# Patient Record
Sex: FEMALE | Race: WHITE | NOT HISPANIC OR LATINO | Employment: OTHER | URBAN - NONMETROPOLITAN AREA
[De-identification: names, ages, dates, MRNs, and addresses within clinical notes are randomized per-mention and may not be internally consistent; named-entity substitution may affect disease eponyms.]

---

## 2017-01-03 ENCOUNTER — HOSPITAL ENCOUNTER (OUTPATIENT)
Dept: LAB | Facility: MEDICAL CENTER | Age: 82
End: 2017-01-03
Attending: NURSE PRACTITIONER
Payer: MEDICARE

## 2017-01-03 LAB
INR PPP: 2.61 (ref 0.87–1.13)
PROTHROMBIN TIME: 28.7 SEC (ref 12–14.6)

## 2017-01-03 PROCEDURE — 36415 COLL VENOUS BLD VENIPUNCTURE: CPT

## 2017-01-03 PROCEDURE — 85610 PROTHROMBIN TIME: CPT

## 2017-01-04 ENCOUNTER — ANTICOAGULATION MONITORING (OUTPATIENT)
Dept: VASCULAR LAB | Facility: MEDICAL CENTER | Age: 82
End: 2017-01-04

## 2017-01-04 DIAGNOSIS — Z95.2 HISTORY OF HEART VALVE REPLACEMENT WITH MECHANICAL VALVE: ICD-10-CM

## 2017-01-04 NOTE — PROGRESS NOTES
OP Anticoagulation Telephone Note    Date: 1/4/2017  Anticoagulation Summary as of 1/4/2017     INR goal 2.5-3.5   Selected INR 2.61 (1/3/2017)   Maintenance plan 2.5 mg (5 mg x 0.5) on Mon, Fri; 5 mg (5 mg x 1) all other days   Weekly total 30 mg   No change documented Aminata Gomez, Med Ass't   Plan last modified Galileo Ramirez, ADRIA (10/21/2016)   Next INR check 1/31/2017   Target end date Indefinite    Indications   CVA (cerebral vascular accident) (HCC) [I63.9]  History of heart valve replacement with mechanical valve [V43.3] [Z95.2]         Anticoagulation Episode Summary     INR check location     Preferred lab     Send INR reminders to     Comments       Anticoagulation Care Providers     Provider Role Specialty Phone number    Robi Bolaños, PHARMD Responsible          Anticoagulation Patient Findings   Negatives Missed Doses, Extra Doses, Medication Changes, Antibiotic Use, Diet Changes, Dental/Other Procedures, Hospitalization, Bleeding Gums, Nose Bleeds, Blood in Urine, Blood in Stool, Any Bruising, Other Complaints      Plan:  Left message on patient's answering machine/ voicemail. Instructed patient to call back with any concerns regarding any unusual bleeding or bruising, any medication or diet changes, or any signs or symptoms of thrombosis. Instructed patient to resume medication as outlined above. Patient to follow up in 4 weeks.     Aminata Gomez, Medical Assistant    I have reviewed and concur with the above plan     Loyd Moses, FAUSTINAD

## 2017-01-23 ENCOUNTER — OFFICE VISIT (OUTPATIENT)
Dept: BEHAVIORAL HEALTH | Facility: PHYSICIAN GROUP | Age: 82
End: 2017-01-23
Payer: MEDICARE

## 2017-01-23 DIAGNOSIS — F06.30 MOOD DISORDER DUE TO KNOWN PHYSIOLOGICAL CONDITION, UNSPECIFIED: ICD-10-CM

## 2017-01-23 PROCEDURE — 99213 OFFICE O/P EST LOW 20 MIN: CPT | Performed by: PSYCHIATRY & NEUROLOGY

## 2017-01-23 NOTE — MR AVS SNAPSHOT
Adrienne Mejia   2017 2:30 PM   Office Visit   MRN: [id number]    Department:  [facility]   Dept Phone:  [phone]    Description:  Female : 1934   Provider:  Huy Yates M.D.           Reason for Visit     Follow-Up feeling up for most of the time lately.      Allergies as of 2017     No Known Allergies      Vital Signs     Smoking Status                   Never Smoker            Basic Information     Date Of Birth Sex Race Ethnicity Preferred Language    1934 Female White Non- English      Problem List              ICD-10-CM Priority Class Noted - Resolved    S/P AVR Z95.2   2012 - Present    Benign essential HTN (Chronic) I10   2012 - Present    S/P cardiac catheterization Z98.890   2012 - Present    Chronic anticoagulation (Chronic) Z79.01   2012 - Present    DM (diabetes mellitus) (CMS-HCC) (Chronic) E11.9   2012 - Present    Hx of echocardiogram (Chronic) Z92.89   2012 - Present    Hyperlipidemia E78.5   11/15/2012 - Present    Carotid artery stenosis I65.29   11/15/2012 - Present    Left shoulder pain M25.512   3/7/2013 - Present    CVA (cerebral vascular accident) (CMS-HCC) I63.9   Unknown - Present    Fecal urgency (Chronic) R15.2   3/7/2013 - Present    Urgency of urination (Chronic) R39.15   3/7/2013 - Present    CRF (chronic renal failure) N18.9  Chronic 2013 - Present    Hypothyroid E03.9  Chronic 10/4/2013 - Present    GERD (gastroesophageal reflux disease) K21.9   Unknown - Present    Seasonal allergies J30.2   2014 - Present    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, 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   2014 - Present    GI bleed K92.2   12/3/2014 - Present    Lower urinary tract infectious disease N39.0   12/3/2014 - Present    History of mitral valve replacement with mechanical valve [V43.3] Z95.2   2015 - Present    History of heart valve replacement with mechanical valve [V43.3] Z95.2   2015 - Present      Health Maintenance        Date Due Completion Dates    IMM DTaP/Tdap/Td Vaccine (1 - Tdap) 1953 ---    MAMMOGRAM 1974 ---    COLONOSCOPY 1984 ---    IMM ZOSTER VACCINE 1994 ---    BONE DENSITY 1999 ---    IMM PNEUMOCOCCAL 65+ (ADULT) LOW/MEDIUM RISK SERIES (1 of 2 - PCV13) 1999 ---    DIABETES MONOFILAMENT / LE EXAM 2013 (Prv Comp)    Override on 2012: Previously completed (decreased sensitivity to monofilament bilaterally)    RETINAL SCREENING 2016    IMM INFLUENZA (1) 2016, 10/12/2012    URINE ACR / MICROALBUMIN 2016, 5/15/2015, 2014, 10/3/2013, 2012 (Prv Comp)    Override on 2012: Previously completed (6.21 mg/gm creatinine)    SERUM CREATININE 2016, 5/15/2015, 12/3/2014, 2014, 2014, 4/15/2014, 10/3/2013, 2012 (Prv Comp), 2010, 4/15/2010, 2010, 2010, 4/10/2010, 2010, 2010, 2010, 2010, 2010, 4/3/2010    Override on 2012: Previously completed (1.2 mg/dL)    A1C SCREENING 2017, 2016, 3/23/2016, 2015, 5/15/2015, 2015, 2014, 4/15/2014, 10/3/2013, 2012 (Prv Comp), 4/3/2010    Override on 2012: Previously completed (7.2%)    FASTING LIPID PROFILE 2017, 2015, 12/3/2014, 2014, 4/15/2014, 2/10/2012 (Prv Comp), 4/3/2010    Override on 2/10/2012: Previously completed (Total:  150;  LDL:  75;  HDL  48;  T)            Current Immunizations     Influenza TIV (IM) 2013 12:09 PM, 10/12/2012      Below and/or attached are the medications your provider expects you to take. Review all of your home medications and newly ordered medications with your provider and/or pharmacist. Follow medication instructions as directed by your provider and/or pharmacist. Please keep your medication list with you and share with your provider. Update the information when medications are discontinued, doses are  changed, or new medications (including over-the-counter products) are added; and carry medication information at all times in the event of emergency situations     Allergies:  No Known Allergies          Medications  Valid as of: January 23, 2017 -  3:23 PM    Generic Name Brand Name Tablet Size Instructions for use    Acetaminophen (Tab CR) TYLENOL 650 MG Take 650 mg by mouth every four hours as needed for Mild Pain.        ALPRAZolam (Tab) XANAX 0.5 MG Take 1 Tab by mouth at bedtime as needed for Sleep.        Canagliflozin   Take  by mouth.        Cholecalciferol (Tab) cholecalciferol 1000 UNIT Take 1,000 Units by mouth 2 Times a Day.        Doxycycline Hyclate (Tab) VIBRAMYCIN 100 MG Take 1 Tab by mouth 2 times a day.        Glimepiride (Tab) AMARYL 4 MG Take 4 mg by mouth every morning.        Insulin Glargine (Solution) LANTUS 100 UNIT/ML Inject 16 Units as instructed every day.        Levothyroxine Sodium (Tab) SYNTHROID 88 MCG Take 1 Tab by mouth every day.        Loratadine (Tab) CLARITIN 10 MG Take 1 Tab by mouth every day.        Omeprazole (CAPSULE DELAYED RELEASE) PRILOSEC 20 MG Take 1 Cap by mouth every day.        Pravastatin Sodium (Tab) PRAVACHOL 40 MG Take 1 Tab by mouth every day.        Warfarin Sodium (Tab) COUMADIN 5 MG TAKE ONE-HALF TO ONE TABLET BY MOUTH ONCE DAILY AS  DIRECTED  BY  COUMADIN  CLINIC        Zolpidem Tartrate   Take  by mouth.        .                 Medicines prescribed today were sent to:     [facility] [address] [place] [id number]    Phone: [phone] Fax: [phone]    Open 24 Hours?: No      Medication refill instructions:       If your prescription bottle indicates you have medication refills left, it is not necessary to call your provider’s office. Please contact your pharmacy and they will refill your medication.    If your prescription bottle indicates you do not have any refills left, you may request refills at  any time through one of the following ways: The online [employer] system (except Urgent Care), by calling your provider’s office, or by asking your pharmacy to contact your provider’s office with a refill request. Medication refills are processed only during regular business hours and may not be available until the next business day. Your provider may request additional information or to have a follow-up visit with you prior to refilling your medication.   *Please Note: Medication refills are assigned a new Rx number when refilled electronically. Your pharmacy may indicate that no refills were authorized even though a new prescription for the same medication is available at the pharmacy. Please request the medicine by name with the pharmacy before contacting your provider for a refill.        Other Notes About Your Plan     This patient has seen Danelle Adler in the past. Please reach out to her for an AWV. 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 In your medical opinion, could this be c/w hemorrhagic disorder d/t extrinsic circulating anticoagulants? If you are in agreement suggested code is D68.32.    Query: Could you please obtain her last retinal screening exam?           [employer] Access Code: [email or web address]  Expires: 2/13/2017  9:33 AM    [employer]  A secure, online tool to manage your health information     [employer]® is a secure, online tool that connects you to your personalized health information from the privacy of your home -- day or night - making it very easy for you to manage your healthcare. Once the activation process is completed, you can even access your medical information using the [employer] andrea, which is available for free  in the [employer] store or Google Play store.     [employer] provides the following levels of access (as shown below):   My Chart Features   [facility] Doctor [facility]  Specialists [facility]  Urgent  Care [facility]  Primary Care  Doctor   Email your healthcare team securely and privately 24/7 X X X    Manage appointments: schedule your next appointment; view details of past/upcoming appointments X      Request prescription refills. X      View recent personal medical records, including lab and immunizations X X X X   View health record, including health history, allergies, medications X X X X   Read reports about your outpatient visits, procedures, consult and ER notes X X X X   See your discharge summary, which is a recap of your hospital and/or ER visit that includes your diagnosis, lab results, and care plan. X X       How to register for [employer]:  1. Go to  [email or web address].  2. Click on the Sign Up Now box, which takes you to the New Member Sign Up page. You will need to provide the following information:  a. Enter your [employer] Access Code exactly as it appears at the top of this page. (You will not need to use this code after you’ve completed the sign-up process. If you do not sign up before the expiration date, you must request a new code.)   b. Enter your date of birth.   c. Enter your home email address.   d. Click Submit, and follow the next screen’s instructions.  3. Create a [employer] ID. This will be your [employer] login ID and cannot be changed, so think of one that is secure and easy to remember.  4. Create a [employer] password. You can change your password at any time.  5. Enter your Password Reset Question and Answer. This can be used at a later time if you forget your password.   6. Enter your e-mail address. This allows you to receive e-mail notifications when new information is available in [employer].  7. Click Sign Up. You can now view your health information.    For assistance activating  your MyChart account, call [phone]

## 2017-01-24 NOTE — PROGRESS NOTES
"[facility]  PSYCHIATRIC FOLLOW-UP NOTE    Name: Adrienne Mejia  MRN: [id number]  : 1934  Age: 82 y.o.  Date of assessment: 2017  PCP: Connor Horn M.D.  Persons in attendance: Patient    REASON FOR VISIT/CHIEF COMPLAINT (as stated by Patient):  Adrienne Mejia is a 82 y.o., White female, attending follow-up appointment for   Chief Complaint   Patient presents with   • Follow-Up     feeling up for most of the time lately.   .      HISTORY OF PRESENT ILLNESS:    Pt tx for Mood disorder NOS, pt not taking meds.  Pt reports to clinic and states that \"things were going good until yesterday\".  Pt explains that her  stated for the first time that he would like a divorce, and he would like to part ways with the pt.  Pt states that she thought things were going good, \"until he said that\".  Pt again recounted the story of visiting  but not being able to decide if she or they wanted to go through with the divorce.  Pt debated further if she actually wants a divorce.  Pt discussed her need to get a job if the  leaves, and asked what I thought.  Encouraged pt to consider her ability to drive, find a position suited to her current ability, and whether she actually wants to divorce her .  Pt again recounts the sexual performance issues of her , her desire to find other sexual partners, and whether  and seeking them out is a good idea. Pt continues to [place] the same subject matter.  Pt has not pursued [facility], as previously discussed.  Discussed the possibility of couples therapy to help mediate communication between the pt and .  Pt notes they are selling the house in [place].  Pt states the  will give her everything, that he just wants to be free of her.     PSYCHOSOCIAL CHANGES SINCE PREVIOUS CONTACT:  No change    RESPONSE TO TREATMENT:  Not taking meds    MEDICATION SIDE EFFECTS:  n/a    REVIEW OF SYSTEMS:        Constitutional " "negative   Eyes negative   Ears/Nose/Mouth/Throat negative   Cardiovascular negative   Respiratory negative   Gastrointestinal negative   Genitourinary negative   Muscular negative   Integumentary negative   Neurological negative   Endocrine negative   Hematologic/Lymphatic negative       PSYCHIATRIC EXAMINATION/MENTAL STATUS  There were no vitals taken for this visit.  Participation: Active verbal participation  Grooming:Neat  Orientation: Alert and Fully Oriented  Eye contact: Good  Behavior:Calm   Mood: Euthymic  Affect: Congruent with content  Thought process: Perseveration  Thought content:  Within normal limits  Speech: Rate within normal limits and Volume within normal limits  Perception:  Within normal limits  Memory:  Pt exhibited some deficits, last MOCA was a 24/30.    Insight: Adequate  Judgment: Adequate  Family/couple interaction observations: trouble relationship with   Other:    Current risk:    Suicide: Low   Homicide: Low   Self-harm: Low  Relapse: Low  Other:   Crisis Safety Plan reviewed?Yes  If evidence of imminent risk is present, intervention/plan:    Medical Records/Labs/Diagnostic Tests Reviewed: continues to have PT/INR monitored    Medical Records/Labs/Diagnostic Tests Ordered: none    DIAGNOSTIC IMPRESSION(S):  1. Mood disorder due to known physiological condition, unspecified           ASSESSMENT AND PLAN:  #Mood disorder, unspecified  #R/O dependent traits  Pt continues to complain of the same issues.  She complains on this visit that \"things were good until yesterday\" when her  verbally suggested divorce for the first time.  Pt recounted several of the big decisions she has always wrestled with.  Encourage and reminded pt about the importance and need for therapy in her case.  Encourage pt to seek individual but also couples therapy via [facility] because this may help mediate communication with .  Pt conitnues to wrestle with the idea of independence and a job vs " "\"needing\" her  for transportation and financial assistance.  Pt has had intermittent conversations with  on the matter of divorce.  Pt reports she is having difficulty with the co-pay so she would like to return to clinic PRN.  Provided supportive therapy.    More than 50% of face-to-face time in this 30minute visit was not spent in psychotherapy/counseling.    Topics addressed include:    Huy Yates M.D.                   "

## 2017-01-31 ENCOUNTER — HOSPITAL ENCOUNTER (OUTPATIENT)
Dept: LAB | Facility: MEDICAL CENTER | Age: 82
End: 2017-01-31
Attending: NURSE PRACTITIONER
Payer: MEDICARE

## 2017-01-31 LAB
INR PPP: 2.48 (ref 0.87–1.13)
PROTHROMBIN TIME: 27.6 SEC (ref 12–14.6)

## 2017-01-31 PROCEDURE — 85610 PROTHROMBIN TIME: CPT

## 2017-01-31 PROCEDURE — 36415 COLL VENOUS BLD VENIPUNCTURE: CPT

## 2017-02-01 ENCOUNTER — ANTICOAGULATION MONITORING (OUTPATIENT)
Dept: VASCULAR LAB | Facility: MEDICAL CENTER | Age: 82
End: 2017-02-01

## 2017-02-01 DIAGNOSIS — Z95.2 HISTORY OF HEART VALVE REPLACEMENT WITH MECHANICAL VALVE: ICD-10-CM

## 2017-02-01 NOTE — PROGRESS NOTES
OP Anticoagulation Service Note    Date: 2/1/2017    Anticoagulation Summary as of 2/1/2017     INR goal 2.5-3.5   Selected INR 2.48! (1/31/2017)   Maintenance plan 2.5 mg (5 mg x 0.5) on Mon, Fri; 5 mg (5 mg x 1) all other days   Weekly total 30 mg   Plan last modified Galileo Ramirez PHARMD (10/21/2016)   Next INR check 2/14/2017   Target end date Indefinite    Indications   CVA (cerebral vascular accident) (CMS-HCC) [I63.9]  History of heart valve replacement with mechanical valve [V43.3] [Z95.2]         Anticoagulation Episode Summary     INR check location     Preferred lab     Send INR reminders to     Comments       Anticoagulation Care Providers     Provider Role Specialty Phone number    Robi Bolaños, PHARMD Responsible          Anticoagulation Patient Findings   Positives Diet Changes          Plan:  INR is low today. Spoke with pt on the phone. Confirmed dosing regimen. No missed or extra dosing taken. Patient denies sign/symptoms of bleeding/clotting. No recent medication changes She reports she may be eating more green vegetables.  Instructed pt to call clinic with any concerns of bleeding or thrombosis. Instructed pt to take 7.5 mg tonight ONLY then tomorrow resume usual regimen.  Follow up in 2 weeks        Sarahy Fink, Pharm D

## 2017-03-02 ENCOUNTER — TELEPHONE (OUTPATIENT)
Dept: VASCULAR LAB | Facility: MEDICAL CENTER | Age: 82
End: 2017-03-02

## 2017-03-02 NOTE — TELEPHONE ENCOUNTER
[facility]    Pt states he will obtain INR at earliest convenience.    Robi Bolaños, PHARMD

## 2017-03-27 ENCOUNTER — ANTICOAGULATION MONITORING (OUTPATIENT)
Dept: VASCULAR LAB | Facility: MEDICAL CENTER | Age: 82
End: 2017-03-27

## 2017-03-27 ENCOUNTER — TELEPHONE (OUTPATIENT)
Dept: VASCULAR LAB | Facility: MEDICAL CENTER | Age: 82
End: 2017-03-27

## 2017-03-27 DIAGNOSIS — Z95.2 HISTORY OF HEART VALVE REPLACEMENT WITH MECHANICAL VALVE: ICD-10-CM

## 2017-03-27 LAB — INR PPP: 2 (ref 2–3.5)

## 2017-03-27 NOTE — TELEPHONE ENCOUNTER
[facility]    Pt states she will obtain INR at earliest convenience.    Robi Bolaños, PHARMD

## 2017-03-27 NOTE — PROGRESS NOTES
Anticoagulation Summary as of 3/27/2017     INR goal 2.5-3.5   Selected INR 2.0! (3/27/2017)   Maintenance plan 2.5 mg (5 mg x 0.5) on Tue; 5 mg (5 mg x 1) all other days   Weekly total 32.5 mg   Plan last modified Galileo Ramirez PHARMD (3/27/2017)   Next INR check 4/10/2017   Target end date Indefinite    Indications   CVA (cerebral vascular accident) (CMS-HCC) [I63.9]  History of heart valve replacement with mechanical valve [V43.3] [Z95.2]         Anticoagulation Episode Summary     INR check location     Preferred lab     Send INR reminders to     Comments       Anticoagulation Care Providers     Provider Role Specialty Phone number    Robi Bolaños, PHARMD Responsible          Anticoagulation Patient Findings   Negatives Missed Doses, Extra Doses, Medication Changes, Antibiotic Use, Diet Changes, Dental/Other Procedures, Hospitalization, Bleeding Gums, Nose Bleeds, Blood in Urine, Blood in Stool, Any Bruising, Other Complaints        Spoke with patient today regarding subtherapeutic INR of 2.0.  Patient denies any signs/symptoms of bruising or bleeding or any changes in diet and medications.  Instructed patient to call clinic with any questions or concerns.  Instructed patient to bolus with 7.5mg X 1, then increase weekly warfarin regimen by ~8% as detailed above.  Follow up in 2 weeks.    Galileo Ramirez PHARMD

## 2017-04-25 ENCOUNTER — TELEPHONE (OUTPATIENT)
Dept: VASCULAR LAB | Facility: MEDICAL CENTER | Age: 82
End: 2017-04-25

## 2017-04-26 LAB — INR PPP: 2.6 (ref 2–3.5)

## 2017-04-27 ENCOUNTER — ANTICOAGULATION MONITORING (OUTPATIENT)
Dept: VASCULAR LAB | Facility: MEDICAL CENTER | Age: 82
End: 2017-04-27

## 2017-04-27 DIAGNOSIS — Z95.2 HISTORY OF HEART VALVE REPLACEMENT WITH MECHANICAL VALVE: ICD-10-CM

## 2017-04-27 NOTE — PROGRESS NOTES
Anticoagulation Summary as of 4/27/2017     INR goal 2.5-3.5   Selected INR 2.6 (4/26/2017)   Maintenance plan 2.5 mg (5 mg x 0.5) on Tue; 5 mg (5 mg x 1) all other days   Weekly total 32.5 mg   Plan last modified Galileo Ramirez PHARMD (3/27/2017)   Next INR check 5/25/2017   Target end date Indefinite    Indications   CVA (cerebral vascular accident) (CMS-HCC) [I63.9]  History of heart valve replacement with mechanical valve [V43.3] [Z95.2]         Anticoagulation Episode Summary     INR check location     Preferred lab     Send INR reminders to     Comments       Anticoagulation Care Providers     Provider Role Specialty Phone number    Robi Bolaños, PHARMD Responsible          Anticoagulation Patient Findings    Patient's INR was therapeutic.   Denies any unusual s/s of bleeding, bruising, clotting.  Denies any changes to:   Diet   Medications  Confirmed dosing regimen.    Pt is to continue with current warfarin dosing regimen.    Follow up in 4 week(s)    Robi Bolaños, FAUSTINAD

## 2017-06-07 ENCOUNTER — TELEPHONE (OUTPATIENT)
Dept: VASCULAR LAB | Facility: MEDICAL CENTER | Age: 82
End: 2017-06-07

## 2017-06-07 NOTE — TELEPHONE ENCOUNTER
[facility]    Phone number not working.  Unable to leave message to have INR checked.  Will continue to follow.    Robi Bolaños, FAUSTINAD

## 2017-06-14 ENCOUNTER — TELEPHONE (OUTPATIENT)
Dept: VASCULAR LAB | Facility: MEDICAL CENTER | Age: 82
End: 2017-06-14

## 2017-06-28 ENCOUNTER — TELEPHONE (OUTPATIENT)
Dept: VASCULAR LAB | Facility: MEDICAL CENTER | Age: 82
End: 2017-06-28

## 2017-06-29 DIAGNOSIS — Z95.2 HISTORY OF MITRAL VALVE REPLACEMENT WITH MECHANICAL VALVE: ICD-10-CM

## 2017-06-29 DIAGNOSIS — Z79.01 CHRONIC ANTICOAGULATION: Chronic | ICD-10-CM

## 2017-06-29 LAB — INR PPP: 2.9 (ref 2–3.5)

## 2017-06-30 ENCOUNTER — ANTICOAGULATION MONITORING (OUTPATIENT)
Dept: VASCULAR LAB | Facility: MEDICAL CENTER | Age: 82
End: 2017-06-30

## 2017-06-30 DIAGNOSIS — Z95.2 HISTORY OF HEART VALVE REPLACEMENT WITH MECHANICAL VALVE: ICD-10-CM

## 2017-06-30 NOTE — PROGRESS NOTES
Anticoagulation Summary as of 6/30/2017     INR goal 2.5-3.5   Selected INR 2.9 (6/29/2017)   Maintenance plan 2.5 mg (5 mg x 0.5) on Tue; 5 mg (5 mg x 1) all other days   Weekly total 32.5 mg   Plan last modified Galileo Ramirez PHARMD (3/27/2017)   Next INR check 8/11/2017   Target end date Indefinite    Indications   CVA (cerebral vascular accident) (CMS-[facility]) [I63.9]  History of heart valve replacement with mechanical valve [V43.3] [Z95.2]         Anticoagulation Episode Summary     INR check location     Preferred lab     Send INR reminders to     Comments       Anticoagulation Care Providers     Provider Role Specialty Phone number    Robi Bolaños, PHARMD Responsible          Anticoagulation Patient Findings   Negatives Missed Doses, Extra Doses, Medication Changes, Antibiotic Use, Diet Changes, Dental/Other Procedures, Hospitalization, Bleeding Gums, Nose Bleeds, Blood in Urine, Blood in Stool, Any Bruising, Other Complaints        Spoke with patient today regarding therapeutic INR of 2.9.  Patient denies any signs/symptoms of bruising or bleeding or any changes in diet and medications.  Instructed patient to call clinic with any questions or concerns.  Pt is to continue with current warfarin dosing regimen.  Follow up in 6 weeks.    Galileo Ramirez, ADRIA

## 2017-07-20 LAB — INR PPP: 4.7 (ref 2–3.5)

## 2017-07-21 ENCOUNTER — ANTICOAGULATION MONITORING (OUTPATIENT)
Dept: VASCULAR LAB | Facility: MEDICAL CENTER | Age: 82
End: 2017-07-21

## 2017-07-21 DIAGNOSIS — Z95.2 HISTORY OF HEART VALVE REPLACEMENT WITH MECHANICAL VALVE: ICD-10-CM

## 2017-07-21 NOTE — PROGRESS NOTES
Anticoagulation Summary as of 7/21/2017     INR goal 2.5-3.5   Selected INR 4.7! (7/20/2017)   Maintenance plan 2.5 mg (5 mg x 0.5) on Tue; 5 mg (5 mg x 1) all other days   Weekly total 32.5 mg   Plan last modified Galileo Ramirez, PHARMD (3/27/2017)   Next INR check 8/4/2017   Target end date Indefinite    Indications   CVA (cerebral vascular accident) (CMS-HCC) [I63.9]  History of heart valve replacement with mechanical valve [V43.3] [Z95.2]         Anticoagulation Episode Summary     INR check location     Preferred lab     Send INR reminders to     Comments       Anticoagulation Care Providers     Provider Role Specialty Phone number    FAUSTINA HolderD Responsible          Anticoagulation Patient Findings    Patient's INR was SUPRA therapeutic.   Denies any unusual s/s of bleeding, bruising, clotting.  Denies any changes to:   Diet   Medications  Denies alcohol or cranberry use.   Confirmed dosing regimen.    Pt is to hold today then continue with current warfarin dosing regimen.  She states she might have a colonoscopy in 3 days. Requested pt to contact operating physician's office and confirm date of colonoscopy because she is unsure.  Also if colonoscopy is in 3 days it may need to be rescheduled given she hasn't been holding warfarin, needs to bridge, and current INR is elevated.  Instructed pt to return call to clinic to find out if procedure will continue forward and which day.    Follow up in 2 week(s)    Robi Bolaños, FAUSTINAD

## 2017-07-28 ENCOUNTER — ANTICOAGULATION VISIT (OUTPATIENT)
Dept: MEDICAL GROUP | Facility: PHYSICIAN GROUP | Age: 82
End: 2017-07-28
Payer: MEDICARE

## 2017-07-28 VITALS — HEART RATE: 98 BPM | SYSTOLIC BLOOD PRESSURE: 148 MMHG | DIASTOLIC BLOOD PRESSURE: 68 MMHG

## 2017-07-28 DIAGNOSIS — Z95.2 HISTORY OF HEART VALVE REPLACEMENT WITH MECHANICAL VALVE: ICD-10-CM

## 2017-07-28 LAB — INR PPP: 2.6 (ref 2–3.5)

## 2017-07-28 PROCEDURE — 85610 PROTHROMBIN TIME: CPT | Performed by: EMERGENCY MEDICINE

## 2017-07-28 RX ORDER — ASPIRIN 81 MG/1
81 TABLET, CHEWABLE ORAL DAILY
COMMUNITY

## 2017-07-28 NOTE — MR AVS SNAPSHOT
Adrienne Mejia   2017 11:15 AM   Anticoagulation Visit   MRN: [id number]    Department:  [facility]   Dept Phone:  [phone]    Description:  Female : 1934   Provider:  FERNLEY PHARMACIST           Allergies as of 2017     No Known Allergies      You were diagnosed with     History of heart valve replacement with mechanical valve   [[id number]]         Vital Signs     Blood Pressure Pulse Smoking Status             148/68 mmHg 98 Never Smoker          Basic Information     Date Of Birth Sex Race Ethnicity Preferred Language    1934 Female White Non- English      Your appointments     Aug 11, 2017 11:45 AM   Anti-Coag Routine with FERNLEY PHARMACIST   [facility] [place]    [address]  [place] [address] [phone]              Problem List              ICD-10-CM Priority Class Noted - Resolved    S/P AVR Z95.2 High  2012 - Present    Benign essential HTN (Chronic) I10 Medium  2012 - Present    S/P cardiac catheterization Z98.890   2012 - Present    Chronic anticoagulation (Chronic) Z79.01 High  2012 - Present    DM (diabetes mellitus) (CMS-HCC) (Chronic) E11.9 Low  2012 - Present    Hx of echocardiogram (Chronic) Z92.89   2012 - Present    Hyperlipidemia E78.5 Medium  11/15/2012 - Present    Carotid artery stenosis I65.29 Low  11/15/2012 - Present    Left shoulder pain M25.512   3/7/2013 - Present    CVA (cerebral vascular accident) (CMS-HCC) I63.9 Low  Unknown - Present    Fecal urgency (Chronic) R15.2   3/7/2013 - Present    Urgency of urination (Chronic) R39.15   3/7/2013 - Present    CRF (chronic renal failure) N18.9 Medium Chronic 2013 - Present    Hypothyroid E03.9 Medium Chronic 10/4/2013 - Present    GERD (gastroesophageal reflux disease) K21.9   Unknown - Present    Seasonal allergies J30.2   2014 - Present    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, 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   2014 - Present    GI bleed  K92.2   12/3/2014 - Present    Lower urinary tract infectious disease N39.0   12/3/2014 - Present    History of mitral valve replacement with mechanical valve [V43.3] Z95.2   2015 - Present    History of heart valve replacement with mechanical valve [V43.3] Z95.2   2015 - Present      Health Maintenance        Date Due Completion Dates    IMM DTaP/Tdap/Td Vaccine (1 - Tdap) 1953 ---    MAMMOGRAM 1974 ---    COLONOSCOPY 1984 ---    IMM ZOSTER VACCINE 1994 ---    BONE DENSITY 1999 ---    IMM PNEUMOCOCCAL 65+ (ADULT) LOW/MEDIUM RISK SERIES (1 of 2 - PCV13) 1999 ---    DIABETES MONOFILAMENT / LE EXAM 2013 (Prv Comp)    Override on 2012: Previously completed (decreased sensitivity to monofilament bilaterally)    RETINAL SCREENING 2016    URINE ACR / MICROALBUMIN 2016, 5/15/2015, 2014, 2014, 10/3/2013, 2012 (Prv Comp)    Override on 2012: Previously completed (6.21 mg/gm creatinine)    SERUM CREATININE 2016, 5/15/2015, 12/3/2014, 2014, 2014, 4/15/2014, 10/3/2013, 2012 (Prv Comp), 2010, 4/15/2010, 2010, 2010, 4/10/2010, 2010, 2010, 2010, 2010, 2010, 4/3/2010    Override on 2012: Previously completed (1.2 mg/dL)    A1C SCREENING 2017, 2016, 3/23/2016, 2015, 5/15/2015, 2015, 2014, 4/15/2014, 10/3/2013, 2012 (Prv Comp), 4/3/2010    Override on 2012: Previously completed (7.2%)    FASTING LIPID PROFILE 2017, 2015, 12/3/2014, 2014, 4/15/2014, 2/10/2012 (Prv Comp), 4/3/2010    Override on 2/10/2012: Previously completed (Total:  150;  LDL:  75;  HDL  48;  T)    IMM INFLUENZA (1) 2017, 10/12/2012            Results     POCT Protime      Component    INR    2.6    Comment:     ic valid [id number] 160 exp 2018                        Current Immunizations      Influenza TIV (IM) 12/4/2013 12:09 PM, 10/12/2012      Below and/or attached are the medications your provider expects you to take. Review all of your home medications and newly ordered medications with your provider and/or pharmacist. Follow medication instructions as directed by your provider and/or pharmacist. Please keep your medication list with you and share with your provider. Update the information when medications are discontinued, doses are changed, or new medications (including over-the-counter products) are added; and carry medication information at all times in the event of emergency situations     Allergies:  No Known Allergies          Medications  Valid as of: July 28, 2017 - 11:32 AM    Generic Name Brand Name Tablet Size Instructions for use    Acetaminophen (Tab CR) TYLENOL 650 MG Take 650 mg by mouth every four hours as needed for Mild Pain.        Aspirin (Chew Tab) ASA 81 MG Take 81 mg by mouth every day.        Canagliflozin   Take  by mouth.        Cholecalciferol (Tab) cholecalciferol 1000 UNIT Take 1,000 Units by mouth 2 Times a Day.        Doxycycline Hyclate (Tab) VIBRAMYCIN 100 MG Take 1 Tab by mouth 2 times a day.        Enoxaparin Sodium (Solution) LOVENOX 80 MG/0.8ML Inject 80 mg as instructed every day.        Glimepiride (Tab) AMARYL 4 MG Take 4 mg by mouth every morning.        Insulin Glargine (Solution) LANTUS 100 UNIT/ML Inject 16 Units as instructed every day.        Levothyroxine Sodium (Tab) SYNTHROID 88 MCG Take 1 Tab by mouth every day.        Loratadine (Tab) CLARITIN 10 MG Take 1 Tab by mouth every day.        Omeprazole (CAPSULE DELAYED RELEASE) PRILOSEC 20 MG Take 1 Cap by mouth every day.        Pravastatin Sodium (Tab) PRAVACHOL 40 MG Take 1 Tab by mouth every day.        Warfarin Sodium (Tab) COUMADIN 5 MG TAKE ONE-HALF TO ONE TABLET BY MOUTH ONCE DAILY AS  DIRECTED  BY  COUMADIN  CLINIC        Zolpidem Tartrate   Take  by mouth.        .                 Medicines  prescribed today were sent to:     [facility] [address] [place] [address], [place] - [address] [facility]    [address] [place] [address] [place] [address]    Phone: [phone] Fax: [phone]    Open 24 Hours?: No      Medication refill instructions:       If your prescription bottle indicates you have medication refills left, it is not necessary to call your provider’s office. Please contact your pharmacy and they will refill your medication.    If your prescription bottle indicates you do not have any refills left, you may request refills at any time through one of the following ways: The online [employer] system (except Urgent Care), by calling your provider’s office, or by asking your pharmacy to contact your provider’s office with a refill request. Medication refills are processed only during regular business hours and may not be available until the next business day. Your provider may request additional information or to have a follow-up visit with you prior to refilling your medication.   *Please Note: Medication refills are assigned a new Rx number when refilled electronically. Your pharmacy may indicate that no refills were authorized even though a new prescription for the same medication is available at the pharmacy. Please request the medicine by name with the pharmacy before contacting your provider for a refill.        Your To Do List     Future Labs/Procedures Complete By Expires    COMP METABOLIC PANEL  As directed 7/28/2018    Standing Orders Interval Expires    PROTHROMBIN TIME  daily until 7/28/2018 7/28/2018      Other Notes About Your Plan     This patient has seen Danelle Adler in the past. Please reach out to her for an AWV. 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 In your medical opinion, could this be c/w hemorrhagic disorder d/t extrinsic circulating anticoagulants? If you are in agreement suggested code is D68.32.    Query: Could you please obtain her last retinal screening exam?        Warfarin Dosing Calendar   July 2017 Details    Sun Mon Tue Wed Thu Fri Sat           1                 2               3               4               5               6               7               8                 9               10               11               12               13               14               15                 16               17               18               19               20               21               22                 23               24               25               26               27               28   2.6   5 mg   See details      29      5 mg           30      5 mg         31      5 mg               Date Details   07/28 This INR check   INR: 2.6   ic valid [id number] 160 exp 03/2018               How to take your warfarin dose     To take:  5 mg Take 1 of the 5 mg tablets.           Warfarin Dosing Calendar   August 2017 Details    Sun Mon Tue Wed Thu Fri Sat       1      2.5 mg         2      Hold         3      Hold   See details      4      Hold   See details      5      Hold   See details        6      Hold         7      5 mg         8      5 mg   See details      9      5 mg   See details      10      5 mg   See details      11      5 mg         12                 13               14               15               16               17               18               19                 20               21               22               23               24               25               26                 27               28               29               30               31                  Date Details   08/03 Hold dose   Lovenox 80mg once daily       08/04 Hold dose   Lovenox 80mg once daily       08/05 Hold dose   Lovenox 80mg once  daily       08/08 Take 5 mg (5 mg tablets x 1)   Lovenox 80mg once daily       08/09 Lovenox 80mg once daily       08/10 Lovenox 80mg once daily        Date of next INR:  8/11/2017         How to take your warfarin dose     To take:  2.5 mg Take 0.5 of a 5 mg tablet.    To take:  5 mg Take 1 of the 5 mg tablets.    Hold Do not take your warfarin dose. See the Details table to the right for additional instructions.                   [employer] Access Code: [email or web address]  Expires: 8/27/2017 11:21 AM    [employer]  A secure, online tool to manage your health information     [employer]® is a secure, online tool that connects you to your personalized health information from the privacy of your home -- day or night - making it very easy for you to manage your healthcare. Once the activation process is completed, you can even access your medical information using the [employer] priyank, which is available for free in the Apple Priyank store or Google Play store.     [employer] provides the following levels of access (as shown below):   My Chart Features   [facility] Doctor [facility]  Specialists [facility]  Urgent  Care [facility]  Primary Care  Doctor   Email your healthcare team securely and privately 24/7 X X X    Manage appointments: schedule your next appointment; view details of past/upcoming appointments X      Request prescription refills. X      View recent personal medical records, including lab and immunizations X X X X   View health record, including health history, allergies, medications X X X X   Read reports about your outpatient visits, procedures, consult and ER notes X X X X   See your discharge summary, which is a recap of your hospital and/or ER visit that includes your diagnosis, lab results, and care plan. X X       How to register for [employer]:  1. Go to  [email or web address].  2. Click on the Sign Up Now box, which takes you to the New Member Sign Up page. You will need to provide the following  information:  a. Enter your [employer] Access Code exactly as it appears at the top of this page. (You will not need to use this code after you’ve completed the sign-up process. If you do not sign up before the expiration date, you must request a new code.)   b. Enter your date of birth.   c. Enter your home email address.   d. Click Submit, and follow the next screen’s instructions.  3. Create a [employer] ID. This will be your [employer] login ID and cannot be changed, so think of one that is secure and easy to remember.  4. Create a [employer] password. You can change your password at any time.  5. Enter your Password Reset Question and Answer. This can be used at a later time if you forget your password.   6. Enter your e-mail address. This allows you to receive e-mail notifications when new information is available in [employer].  7. Click Sign Up. You can now view your health information.    For assistance activating your [employer] account, call [phone]

## 2017-07-28 NOTE — PROGRESS NOTES
Anticoagulation Summary as of 7/28/2017     INR goal 2.5-3.5   Selected INR 2.6 (7/28/2017)   Maintenance plan 2.5 mg (5 mg x 0.5) on Tue; 5 mg (5 mg x 1) all other days   Weekly total 32.5 mg   Plan last modified FAUSTINA JiménezD (3/27/2017)   Next INR check 8/11/2017   Target end date Indefinite    Indications   CVA (cerebral vascular accident) (CMS-HCC) [I63.9]  History of heart valve replacement with mechanical valve [V43.3] [Z95.2]         Anticoagulation Episode Summary     INR check location     Preferred lab     Send INR reminders to     Comments       Anticoagulation Care Providers     Provider Role Specialty Phone number    Yuridia Escalante, PHARMD Responsible          Anticoagulation Patient Findings      Current Outpatient Prescriptions on File Prior to Visit   Medication Sig Dispense Refill   • doxycycline (VIBRAMYCIN) 100 MG Tab Take 1 Tab by mouth 2 times a day. 20 Tab 0   • warfarin (COUMADIN) 5 MG Tab TAKE ONE-HALF TO ONE TABLET BY MOUTH ONCE DAILY AS  DIRECTED  BY  COUMADIN  CLINIC 90 Tab 0   • Canagliflozin (INVOKANA PO) Take  by mouth.     • omeprazole (PRILOSEC) 20 MG delayed-release capsule Take 1 Cap by mouth every day. 180 Cap 3   • pravastatin (PRAVACHOL) 40 MG tablet Take 1 Tab by mouth every day. 180 Tab 3   • Zolpidem Tartrate (AMBIEN PO) Take  by mouth.     • insulin glargine (LANTUS SOLOSTAR) 100 UNIT/ML SOLN Inject 16 Units as instructed every day. 20 mL 3   • loratadine (CLARITIN) 10 MG TABS Take 1 Tab by mouth every day. 30 Tab 3   • glimepiride (AMARYL) 4 MG TABS Take 4 mg by mouth every morning.     • levothyroxine (SYNTHROID) 88 MCG TABS Take 1 Tab by mouth every day. 90 Tab 1   • vitamin D (CHOLECALCIFEROL) 1000 UNIT TABS Take 1,000 Units by mouth 2 Times a Day.     • acetaminophen (TYLENOL 8 HOUR) 650 MG CR tablet Take 650 mg by mouth every four hours as needed for Mild Pain.       No current facility-administered medications on file prior to visit.       Lab Results    Component Value Date/Time    SODIUM 137 09/25/2015 11:09 AM    POTASSIUM 3.8 09/25/2015 11:09 AM    CHLORIDE 105 09/25/2015 11:09 AM    CO2 23 09/25/2015 11:09 AM    GLUCOSE 233* 09/25/2015 11:09 AM    BUN 34* 09/25/2015 11:09 AM    CREATININE 1.37 09/25/2015 11:09 AM      Adrienne Mejia seen in clinic today  She will have a colonoscopy on 8/7/2017, she will need to hold 5 days and bridge with Lovenox as seen on the coag track, because we do not have a recent CrCl we will use 1mg/kg once daily due to age and last creatinine. She will also need to stop ASA 7 days before the procedure.   INR  therapeutic.    Denies signs/symptoms of bleeding and/or thrombosis.    Denies changes to diet or medications.   Follow up appointment in 2 week(s) via the phone, we will also have her get a CMP as we need one.     See coag track for details on dosing      Loyd Moses, FAUSTINAD

## 2017-08-07 ENCOUNTER — TELEPHONE (OUTPATIENT)
Dept: VASCULAR LAB | Facility: MEDICAL CENTER | Age: 82
End: 2017-08-07

## 2017-08-07 NOTE — TELEPHONE ENCOUNTER
Spoke with pt on the phone regarding her upcoming colonoscopy.  It was scheduled for today however it was canceled due to insufficient prep, not related to her INR level.  Instructed the pt to resume her usual warfarin dose and continue the Lovenox injections.  She has an appt scheduled for Friday in [place] for an INR check.    Ana WOODY

## 2017-08-11 ENCOUNTER — ANTICOAGULATION VISIT (OUTPATIENT)
Dept: MEDICAL GROUP | Facility: PHYSICIAN GROUP | Age: 82
End: 2017-08-11
Payer: MEDICARE

## 2017-08-11 VITALS — HEART RATE: 61 BPM | DIASTOLIC BLOOD PRESSURE: 86 MMHG | SYSTOLIC BLOOD PRESSURE: 149 MMHG

## 2017-08-11 DIAGNOSIS — Z95.2 HISTORY OF HEART VALVE REPLACEMENT WITH MECHANICAL VALVE: ICD-10-CM

## 2017-08-11 LAB
INR PPP: 1.5 (ref 2–3.5)
INR PPP: 1.5 (ref 2–3.5)

## 2017-08-11 PROCEDURE — 85610 PROTHROMBIN TIME: CPT | Performed by: PHYSICIAN ASSISTANT

## 2017-08-11 NOTE — MR AVS SNAPSHOT
Adrienne Mejia   2017 11:45 AM   Anticoagulation Visit   MRN: [id number]    Department:  [facility]   Dept Phone:  [phone]    Description:  Female : 1934   Provider:  FERNLEY PHARMACIST           Allergies as of 2017     No Known Allergies      You were diagnosed with     History of heart valve replacement with mechanical valve   [[id number]]         Vital Signs     Blood Pressure Pulse Smoking Status             149/86 mmHg 61 Never Smoker          Basic Information     Date Of Birth Sex Race Ethnicity Preferred Language    1934 Female White Non- English      Your appointments     Aug 11, 2017 11:45 AM   Anti-Coag Routine with [facility] PHARMACIST   [facility] ([place])    [address]  [place] [address] [phone]            Aug 18, 2017 11:15 AM   Anti-Coag Routine with STE[facility] PHARMACIST   [facility] ([place])    [address]  [place] [address] [phone]              Problem List              ICD-10-CM Priority Class Noted - Resolved    S/P AVR Z95.2 High  2012 - Present    Benign essential HTN (Chronic) I10 Medium  2012 - Present    S/P cardiac catheterization Z98.890   2012 - Present    Chronic anticoagulation (Chronic) Z79.01 High  2012 - Present    DM (diabetes mellitus) (CMS-HCC) (Chronic) E11.9 Low  2012 - Present    Hx of echocardiogram (Chronic) Z92.89   2012 - Present    Hyperlipidemia E78.5 Medium  11/15/2012 - Present    Carotid artery stenosis I65.29 Low  11/15/2012 - Present    Left shoulder pain M25.512   3/7/2013 - Present    CVA (cerebral vascular accident) (CMS-HCC) I63.9 Low  Unknown - Present    Fecal urgency (Chronic) R15.2   3/7/2013 - Present    Urgency of urination (Chronic) R39.15   3/7/2013 - Present    CRF (chronic renal failure) N18.9 Medium Chronic 2013 - Present    Hypothyroid E03.9 Medium Chronic 10/4/2013 - Present    GERD  (gastroesophageal reflux disease) K21.9   Unknown - Present    Seasonal allergies J30.2   2014 - Present    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, 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   2014 - Present    GI bleed K92.2   12/3/2014 - Present    Lower urinary tract infectious disease N39.0   12/3/2014 - Present    History of mitral valve replacement with mechanical valve [V43.3] Z95.2   2015 - Present    History of heart valve replacement with mechanical valve [V43.3] Z95.2   2015 - Present      Health Maintenance        Date Due Completion Dates    IMM DTaP/Tdap/Td Vaccine (1 - Tdap) 1953 ---    MAMMOGRAM 1974 ---    COLONOSCOPY 1984 ---    IMM ZOSTER VACCINE 1994 ---    BONE DENSITY 1999 ---    IMM PNEUMOCOCCAL 65+ (ADULT) LOW/MEDIUM RISK SERIES (1 of 2 - PCV13) 1999 ---    DIABETES MONOFILAMENT / LE EXAM 2013 (Prv Comp)    Override on 2012: Previously completed (decreased sensitivity to monofilament bilaterally)    RETINAL SCREENING 2016    URINE ACR / MICROALBUMIN 2016, 5/15/2015, 2014, 2014, 10/3/2013, 2012 (Prv Comp)    Override on 2012: Previously completed (6.21 mg/gm creatinine)    SERUM CREATININE 2016, 5/15/2015, 12/3/2014, 2014, 2014, 4/15/2014, 10/3/2013, 2012 (Prv Comp), 2010, 4/15/2010, 2010, 2010, 4/10/2010, 2010, 2010, 2010, 2010, 2010, 4/3/2010    Override on 2012: Previously completed (1.2 mg/dL)    A1C SCREENING 2017, 2016, 3/23/2016, 2015, 5/15/2015, 2015, 2014, 4/15/2014, 10/3/2013, 2012 (Prv Comp), 4/3/2010    Override on 2012: Previously completed (7.2%)    FASTING LIPID PROFILE 2017, 2015, 12/3/2014, 2014, 4/15/2014, 2/10/2012 (Prv Comp), 4/3/2010    Override on 2/10/2012: Previously completed (Total:  150;  LDL:  75;  HDL  48;  T)    IMM INFLUENZA (1) 2017,  10/12/2012            Results     POCT Protime      Component    INR    1.5    Comment:     ic valid [id number] exp 03/2018                POCT Protime      Component    INR    1.5    Comment:      valid [id number] 160 exp 03/2018                        Current Immunizations     Influenza TIV (IM) 12/4/2013 12:09 PM, 10/12/2012      Below and/or attached are the medications your provider expects you to take. Review all of your home medications and newly ordered medications with your provider and/or pharmacist. Follow medication instructions as directed by your provider and/or pharmacist. Please keep your medication list with you and share with your provider. Update the information when medications are discontinued, doses are changed, or new medications (including over-the-counter products) are added; and carry medication information at all times in the event of emergency situations     Allergies:  No Known Allergies          Medications  Valid as of: August 11, 2017 - 11:32 AM    Generic Name Brand Name Tablet Size Instructions for use    Acetaminophen (Tab CR) TYLENOL 650 MG Take 650 mg by mouth every four hours as needed for Mild Pain.        Aspirin (Chew Tab) ASA 81 MG Take 81 mg by mouth every day.        Canagliflozin   Take  by mouth.        Cholecalciferol (Tab) cholecalciferol 1000 UNIT Take 1,000 Units by mouth 2 Times a Day.        Doxycycline Hyclate (Tab) VIBRAMYCIN 100 MG Take 1 Tab by mouth 2 times a day.        Enoxaparin Sodium (Solution) LOVENOX 80 MG/0.8ML Inject 80 mg as instructed every day.        Glimepiride (Tab) AMARYL 4 MG Take 4 mg by mouth every morning.        Insulin Glargine (Solution) LANTUS 100 UNIT/ML Inject 16 Units as instructed every day.        Levothyroxine Sodium (Tab) SYNTHROID 88 MCG Take 1 Tab by mouth every day.        Loratadine (Tab) CLARITIN 10 MG Take 1 Tab by mouth every day.        Omeprazole (CAPSULE DELAYED RELEASE) PRILOSEC 20 MG Take 1 Cap by mouth every day.           Pravastatin Sodium (Tab) PRAVACHOL 40 MG Take 1 Tab by mouth every day.        Warfarin Sodium (Tab) COUMADIN 5 MG TAKE ONE-HALF TO ONE TABLET BY MOUTH ONCE DAILY AS  DIRECTED  BY  COUMADIN  CLINIC        Zolpidem Tartrate   Take  by mouth.        .                 Medicines prescribed today were sent to:     [address] [address]    Phone: [phone] Fax: [phone]    Open 24 Hours?: No      Medication refill instructions:       If your prescription bottle indicates you have medication refills left, it is not necessary to call your provider’s office. Please contact your pharmacy and they will refill your medication.    If your prescription bottle indicates you do not have any refills left, you may request refills at any time through one of the following ways: The online [employer] system (except Urgent Care), by calling your provider’s office, or by asking your pharmacy to contact your provider’s office with a refill request. Medication refills are processed only during regular business hours and may not be available until the next business day. Your provider may request additional information or to have a follow-up visit with you prior to refilling your medication.   *Please Note: Medication refills are assigned a new Rx number when refilled electronically. Your pharmacy may indicate that no refills were authorized even though a new prescription for the same medication is available at the pharmacy. Please request the medicine by name with the pharmacy before contacting your provider for a refill.        Other Notes About Your Plan     This patient has seen Danelle Adler in the past. Please reach out to her for an AWV. 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 In your medical opinion, could this be c/w hemorrhagic disorder d/t extrinsic circulating anticoagulants? If you are in agreement suggested code is D68.32.    Query: Could you please obtain her last retinal screening exam?        Warfarin Dosing Calendar   August 2017 Details    Sun Mon Tue Wed Thu Fri Sat       1               2               3               4               5                 6               7               8               9               10               11   1.5   5 mg   See details      12      5 mg   See details        13      5 mg   See details      14      5 mg   See details      15      5 mg   See details      16      5 mg   See details      17      5 mg         18      5 mg         19                 20               21               22               23               24               25               26                 27               28               29               30               31                  Date Details   08/11 This INR check   INR: 1.5   ic valid [id number] 160 exp 03/2018   Additional INRs: 1.5      08/12 Lovenox 80mg       08/13 Lovenox 80mg       08/14 Lovenox 80mg       08/15 Take 5 mg (5 mg tablets x 1)   Lovenox 80mg       08/16 Lovenox 80mg        Date of next INR:  8/18/2017         How to take your warfarin dose     To take:  5 mg Take 1 of the 5 mg tablets.              Your[employer] Access Code: [email or web address]  Expires: 8/27/2017 11:21 AM    [employer]  A secure, online tool to manage your health information     [employer] Your[employer]® is a secure, online tool that connects you to your personalized health information from the privacy of your home -- day or night - making it very easy for you to manage your healthcare. Once the activation process is completed, you can even access your medical information using the Your[employer] Media priyank, which is available for free in the Apple Priyank store or  Google Play store.     [employer] provides the following levels of access (as shown below):   My Chart Features   [facility] Doctor [facility]  Specialists [facility]  Urgent  Care [facility]  Primary Care  Doctor   Email your healthcare team securely and privately 24/7 X X X    Manage appointments: schedule your next appointment; view details of past/upcoming appointments X      Request prescription refills. X      View recent personal medical records, including lab and immunizations X X X X   View health record, including health history, allergies, medications X X X X   Read reports about your outpatient visits, procedures, consult and ER notes X X X X   See your discharge summary, which is a recap of your hospital and/or ER visit that includes your diagnosis, lab results, and care plan. X X       How to register for [employer]:  1. Go to  [email or web address].  2. Click on the Sign Up Now box, which takes you to the New Member Sign Up page. You will need to provide the following information:  a. Enter your Guard[employer] Access Code exactly as it appears at the top of this page. (You will not need to use this code after you’ve completed the sign-up process. If you do not sign up before the expiration date, you must request a new code.)   b. Enter your date of birth.   c. Enter your home email address.   d. Click Submit, and follow the next screen’s instructions.  3. Create a GuardiCore ID. This will be your Guard[employer] login ID and cannot be changed, so think of one that is secure and easy to remember.  4. Create a Guard[employer] password. You can change your password at any time.  5. Enter your Password Reset Question and Answer. This can be used at a later time if you forget your password.   6. Enter your e-mail address. This allows you to receive e-mail notifications when new information is available in [employer].  7. Click Sign Up. You can now view your health information.    For assistance activating your [employer] account, call  [phone]

## 2017-08-11 NOTE — PROGRESS NOTES
Anticoagulation Summary as of 8/11/2017     INR goal 2.5-3.5   Selected INR 1.5! (8/11/2017)   Maintenance plan 2.5 mg (5 mg x 0.5) on Tue; 5 mg (5 mg x 1) all other days   Weekly total 32.5 mg   Plan last modified Galileo Ramirez PHARMD (3/27/2017)   Next INR check 8/18/2017   Target end date Indefinite    Indications   CVA (cerebral vascular accident) (CMS-HCC) [I63.9]  History of heart valve replacement with mechanical valve [V43.3] [Z95.2]         Anticoagulation Episode Summary     INR check location     Preferred lab     Send INR reminders to     Comments       Anticoagulation Care Providers     Provider Role Specialty Phone number    Yuridia KIRSTEN Escalante, PHARMD Responsible          Anticoagulation Patient Findings      Current Outpatient Prescriptions on File Prior to Visit   Medication Sig Dispense Refill   • enoxaparin (LOVENOX) 80 MG/0.8ML Solution inj Inject 80 mg as instructed every day. 10 Syringe 1   • aspirin (ASA) 81 MG Chew Tab chewable tablet Take 81 mg by mouth every day.     • doxycycline (VIBRAMYCIN) 100 MG Tab Take 1 Tab by mouth 2 times a day. 20 Tab 0   • warfarin (COUMADIN) 5 MG Tab TAKE ONE-HALF TO ONE TABLET BY MOUTH ONCE DAILY AS  DIRECTED  BY  COUMADIN  CLINIC 90 Tab 0   • Canagliflozin (INVOKANA PO) Take  by mouth.     • omeprazole (PRILOSEC) 20 MG delayed-release capsule Take 1 Cap by mouth every day. 180 Cap 3   • pravastatin (PRAVACHOL) 40 MG tablet Take 1 Tab by mouth every day. 180 Tab 3   • Zolpidem Tartrate (AMBIEN PO) Take  by mouth.     • insulin glargine (LANTUS SOLOSTAR) 100 UNIT/ML SOLN Inject 16 Units as instructed every day. 20 mL 3   • loratadine (CLARITIN) 10 MG TABS Take 1 Tab by mouth every day. 30 Tab 3   • glimepiride (AMARYL) 4 MG TABS Take 4 mg by mouth every morning.     • levothyroxine (SYNTHROID) 88 MCG TABS Take 1 Tab by mouth every day. 90 Tab 1   • vitamin D (CHOLECALCIFEROL) 1000 UNIT TABS Take 1,000 Units by mouth 2 Times a Day.     • acetaminophen (TYLENOL 8  HOUR) 650 MG CR tablet Take 650 mg by mouth every four hours as needed for Mild Pain.       No current facility-administered medications on file prior to visit.       Lab Results   Component Value Date/Time    SODIUM 137 09/25/2015 11:09 AM    POTASSIUM 3.8 09/25/2015 11:09 AM    CHLORIDE 105 09/25/2015 11:09 AM    CO2 23 09/25/2015 11:09 AM    GLUCOSE 233* 09/25/2015 11:09 AM    BUN 34* 09/25/2015 11:09 AM    CREATININE 1.37 09/25/2015 11:09 AM        Adrienne Helene Mejia seen in clinic today  INR  sub-therapeutic on Lovenox, she will continue with Lovenox for another 5 days.   Denies signs/symptoms of bleeding and/or thrombosis.    Denies changes to diet or medications.   Follow up appointment in 1 week(s).    Continue Lovenox for 5 more days. Continue weekly warfarin dose as noted     Loyd Moses, PHARMD

## 2017-08-16 ENCOUNTER — RX ONLY (OUTPATIENT)
Age: 82
Setting detail: RX ONLY
End: 2017-08-16

## 2017-08-18 ENCOUNTER — ANTICOAGULATION VISIT (OUTPATIENT)
Dept: MEDICAL GROUP | Facility: PHYSICIAN GROUP | Age: 82
End: 2017-08-18
Payer: MEDICARE

## 2017-08-18 VITALS — DIASTOLIC BLOOD PRESSURE: 102 MMHG | SYSTOLIC BLOOD PRESSURE: 142 MMHG | HEART RATE: 70 BPM

## 2017-08-18 DIAGNOSIS — Z95.2 HISTORY OF HEART VALVE REPLACEMENT WITH MECHANICAL VALVE: ICD-10-CM

## 2017-08-18 LAB — INR PPP: 2.4 (ref 2–3.5)

## 2017-08-18 PROCEDURE — 85610 PROTHROMBIN TIME: CPT | Performed by: PHYSICIAN ASSISTANT

## 2017-08-18 NOTE — PROGRESS NOTES
Anticoagulation Summary as of 8/18/2017     INR goal 2.5-3.5   Selected INR 2.4! (8/18/2017)   Maintenance plan 5 mg (5 mg x 1) every day   Weekly total 35 mg   Plan last modified Loyd Moses, PHARMD (8/18/2017)   Next INR check 9/1/2017   Target end date Indefinite    Indications   CVA (cerebral vascular accident) (CMS-HCC) [I63.9]  History of heart valve replacement with mechanical valve [V43.3] [Z95.2]         Anticoagulation Episode Summary     INR check location     Preferred lab     Send INR reminders to     Comments       Anticoagulation Care Providers     Provider Role Specialty Phone number    Yuridia CURTIS Ava, PHARMD Responsible          Anticoagulation Patient Findings      Current Outpatient Prescriptions on File Prior to Visit   Medication Sig Dispense Refill   • enoxaparin (LOVENOX) 80 MG/0.8ML Solution inj Inject 80 mg as instructed every day. 10 Syringe 1   • aspirin (ASA) 81 MG Chew Tab chewable tablet Take 81 mg by mouth every day.     • doxycycline (VIBRAMYCIN) 100 MG Tab Take 1 Tab by mouth 2 times a day. 20 Tab 0   • warfarin (COUMADIN) 5 MG Tab TAKE ONE-HALF TO ONE TABLET BY MOUTH ONCE DAILY AS  DIRECTED  BY  COUMADIN  CLINIC 90 Tab 0   • Canagliflozin (INVOKANA PO) Take  by mouth.     • omeprazole (PRILOSEC) 20 MG delayed-release capsule Take 1 Cap by mouth every day. 180 Cap 3   • pravastatin (PRAVACHOL) 40 MG tablet Take 1 Tab by mouth every day. 180 Tab 3   • Zolpidem Tartrate (AMBIEN PO) Take  by mouth.     • insulin glargine (LANTUS SOLOSTAR) 100 UNIT/ML SOLN Inject 16 Units as instructed every day. 20 mL 3   • loratadine (CLARITIN) 10 MG TABS Take 1 Tab by mouth every day. 30 Tab 3   • glimepiride (AMARYL) 4 MG TABS Take 4 mg by mouth every morning.     • levothyroxine (SYNTHROID) 88 MCG TABS Take 1 Tab by mouth every day. 90 Tab 1   • vitamin D (CHOLECALCIFEROL) 1000 UNIT TABS Take 1,000 Units by mouth 2 Times a Day.     • acetaminophen (TYLENOL 8 HOUR) 650 MG CR tablet Take 650 mg  by mouth every four hours as needed for Mild Pain.       No current facility-administered medications on file prior to visit.       Lab Results   Component Value Date/Time    SODIUM 137 09/25/2015 11:09 AM    POTASSIUM 3.8 09/25/2015 11:09 AM    CHLORIDE 105 09/25/2015 11:09 AM    CO2 23 09/25/2015 11:09 AM    GLUCOSE 233* 09/25/2015 11:09 AM    BUN 34* 09/25/2015 11:09 AM    CREATININE 1.37 09/25/2015 11:09 AM          Adrienne Mejia seen in clinic today  INR  sub-therapeutic.    Denies signs/symptoms of bleeding and/or thrombosis.    Denies changes to diet or medications.   Follow up appointment in 2 week(s).    Increase weekly warfarin dose as noted     Loyd Moses, PHARMD

## 2017-08-18 NOTE — MR AVS SNAPSHOT
Adrienne Mejia   2017 11:15 AM   Anticoagulation Visit   MRN: [id number]    Department:  [facility]   Dept Phone:  [phone]    Description:  Female : 1934   Provider:  FERNLEY PHARMACIST           Allergies as of 2017     No Known Allergies      You were diagnosed with     History of heart valve replacement with mechanical valve   [[id number]]         Vital Signs     Blood Pressure Pulse Smoking Status             142/102 mmHg 70 Never Smoker          Basic Information     Date Of Birth Sex Race Ethnicity Preferred Language    1934 Female White Non- English      Your appointments     Aug 18, 2017 11:15 AM   Anti-Coag Routine with FERNLEY PHARMACIST   [facility] [place]    [address] [place] [address] [phone]            Aug 29, 2017  4:30 PM   Telemedicine Clinic Established Pt with TELEMED ANTICOAG VASCULAR MED   [facility]  (--)    [address] [address]   [phone]              Problem List              ICD-10-CM Priority Class Noted - Resolved    S/P AVR Z95.2 High  2012 - Present    Benign essential HTN (Chronic) I10 Medium  2012 - Present    S/P cardiac catheterization Z98.890   2012 - Present    Chronic anticoagulation (Chronic) Z79.01 High  2012 - Present    DM (diabetes mellitus) (CMS-HCC) (Chronic) E11.9 Low  2012 - Present    Hx of echocardiogram (Chronic) Z92.89   2012 - Present    Hyperlipidemia E78.5 Medium  11/15/2012 - Present    Carotid artery stenosis I65.29 Low  11/15/2012 - Present    Left shoulder pain M25.512   3/7/2013 - Present    CVA (cerebral vascular accident) (CMS-HCC) I63.9 Low  Unknown - Present    Fecal urgency (Chronic) R15.2   3/7/2013 - Present    Urgency of urination (Chronic) R39.15   3/7/2013 - Present    CRF (chronic renal failure) N18.9 Medium Chronic 2013 - Present    Hypothyroid E03.9 Medium Chronic 10/4/2013 - Present    GERD (gastroesophageal reflux disease) K21.9   Unknown - Present    Seasonal allergies J30.2   2/27/2014 - Present    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, 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   9/30/2014 - Present    GI bleed K92.2   12/3/2014 - Present    Lower urinary tract infectious disease N39.0   12/3/2014 - Present    History of mitral valve replacement with mechanical valve [V43.3] Z95.2   7/9/2015 - Present    History of heart valve replacement with mechanical valve [V43.3] Z95.2   7/9/2015 - Present      Health Maintenance        Date Due Completion Dates    IMM DTaP/Tdap/Td Vaccine (1 - Tdap) 11/20/1953 ---    MAMMOGRAM 11/20/1974 ---    COLONOSCOPY 11/20/1984 ---    IMM ZOSTER VACCINE 11/20/1994 ---    BONE DENSITY 11/20/1999 ---    IMM PNEUMOCOCCAL 65+ (ADULT) LOW/MEDIUM RISK SERIES (1 of 2 - PCV13) 11/20/1999 ---    DIABETES MONOFILAMENT / LE EXAM 9/17/2013 9/17/2012 (Prv Comp)    Override on 9/17/2012: Previously completed (decreased sensitivity to monofilament bilaterally)    RETINAL SCREENING 1/26/2016 1/26/2015    URINE ACR / MICROALBUMIN 9/25/2016 9/25/2015, 5/15/2015, 9/19/2014, 8/25/2014, 10/3/2013, 9/18/2012 (Prv Comp)    Override on 9/18/2012: Previously completed (6.21 mg/gm creatinine)    SERUM CREATININE 9/25/2016 9/25/2015, 5/15/2015, 12/3/2014, 9/19/2014, 8/25/2014, 4/15/2014, 10/3/2013, 9/18/2012 (Prv Comp), 4/16/2010, 4/15/2010, 4/13/2010, 4/11/2010, 4/10/2010, 4/9/2010, 4/7/2010, 4/6/2010, 4/5/2010, 4/4/2010, 4/3/2010    Override on 9/18/2012: Previously completed (1.2 mg/dL)    A1C SCREENING 2/12/2017 8/12/2016, 5/4/2016, 3/23/2016, 9/25/2015, 5/15/2015, 2/4/2015, 9/19/2014, 4/15/2014, 10/3/2013, 9/18/2012 (Prv Comp), 4/3/2010    Override on 9/18/2012: Previously completed (7.2%)    FASTING LIPID PROFILE 5/4/2017 5/4/2016, 9/25/2015, 12/3/2014, 9/19/2014, 4/15/2014, 2/10/2012 (Prv Comp), 4/3/2010    Override on 2/10/2012: Previously completed (Total:  150;  LDL:  75;   HDL  48;  T)    IMM INFLUENZA (1) 2017, 10/12/2012            Results     POCT Protime      Component    INR    2.4    Comment:     ic valid [id number] exp 2018                        Current Immunizations     Influenza TIV (IM) 2013 12:09 PM, 10/12/2012      Below and/or attached are the medications your provider expects you to take. Review all of your home medications and newly ordered medications with your provider and/or pharmacist. Follow medication instructions as directed by your provider and/or pharmacist. Please keep your medication list with you and share with your provider. Update the information when medications are discontinued, doses are changed, or new medications (including over-the-counter products) are added; and carry medication information at all times in the event of emergency situations     Allergies:  No Known Allergies          Medications  Valid as of: 2017 - 11:12 AM    Generic Name Brand Name Tablet Size Instructions for use    Acetaminophen (Tab CR) TYLENOL 650 MG Take 650 mg by mouth every four hours as needed for Mild Pain.        Aspirin (Chew Tab) ASA 81 MG Take 81 mg by mouth every day.        Canagliflozin   Take  by mouth.        Cholecalciferol (Tab) cholecalciferol 1000 UNIT Take 1,000 Units by mouth 2 Times a Day.        Doxycycline Hyclate (Tab) VIBRAMYCIN 100 MG Take 1 Tab by mouth 2 times a day.        Enoxaparin Sodium (Solution) LOVENOX 80 MG/0.8ML Inject 80 mg as instructed every day.        Glimepiride (Tab) AMARYL 4 MG Take 4 mg by mouth every morning.        Insulin Glargine (Solution) LANTUS 100 UNIT/ML Inject 16 Units as instructed every day.        Levothyroxine Sodium (Tab) SYNTHROID 88 MCG Take 1 Tab by mouth every day.        Loratadine (Tab) CLARITIN 10 MG Take 1 Tab by mouth every day.        Omeprazole (CAPSULE DELAYED RELEASE) PRILOSEC 20 MG Take 1 Cap by mouth every day.        Pravastatin Sodium (Tab) PRAVACHOL 40 MG  Take 1 Tab by mouth every day.        Warfarin Sodium (Tab) COUMADIN 5 MG TAKE ONE-HALF TO ONE TABLET BY MOUTH ONCE DAILY AS  DIRECTED  BY  COUMADIN  CLINIC        Zolpidem Tartrate   Take  by mouth.        .                 Medicines prescribed today were sent to:     [address] [facility]    [address] [place] [address]    Phone: [phone] Fax: [phone]    Open 24 Hours?: No      Medication refill instructions:       If your prescription bottle indicates you have medication refills left, it is not necessary to call your provider’s office. Please contact your pharmacy and they will refill your medication.    If your prescription bottle indicates you do not have any refills left, you may request refills at any time through one of the following ways: The online [employer] system (except Urgent Care), by calling your provider’s office, or by asking your pharmacy to contact your provider’s office with a refill request. Medication refills are processed only during regular business hours and may not be available until the next business day. Your provider may request additional information or to have a follow-up visit with you prior to refilling your medication.   *Please Note: Medication refills are assigned a new Rx number when refilled electronically. Your pharmacy may indicate that no refills were authorized even though a new prescription for the same medication is available at the pharmacy. Please request the medicine by name with the pharmacy before contacting your provider for a refill.        Other Notes About Your Plan     This patient has seen Danelle Adler in the past. Please reach out to her for an AWV. 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 In your medical opinion, could this be c/w hemorrhagic disorder d/t extrinsic circulating anticoagulants? If you are in agreement suggested code is D68.32.    Query: Could you please obtain her last retinal screening exam?        Warfarin Dosing Calendar   August 2017 Details    Sun Mon Tue Wed Thu Fri Sat       1               2               3               4               5                 6               7               8               9               10               11               12                 13               14               15               16               17               18   2.4   5 mg   See details      19      5 mg           20      5 mg         21      5 mg         22      5 mg         23      5 mg         24      5 mg         25      5 mg         26      5 mg           27      5 mg         28      5 mg         29      5 mg         30      5 mg         31      5 mg            Date Details   08/18 This INR check   INR: 2.4   ic valid [id number] 160 exp 05/2018               How to take your warfarin dose     To take:  5 mg Take 1 of the 5 mg tablets.           Warfarin Dosing Calendar   September 2017 Details    Sun Mon Tue Wed Thu Fri Sat          1      5 mg         2                 3               4               5               6               7               8               9                 10               11               12               13               14               15               16                 17               18               19               20               21               22               23                 24               25               26               27               28               29               30                Date Details   No additional details    Date of next INR:  9/1/2017         How to take your warfarin dose     To take:  5 mg Take 1 of the 5 mg tablets.              mojio Access Code:  [email or web address]  Expires: 8/27/2017 11:21 AM    [employer]  A secure, online tool to manage your health information     [employer]’s [employer]® is a secure, online tool that connects you to your personalized health information from the privacy of your home -- day or night - making it very easy for you to manage your healthcare. Once the activation process is completed, you can even access your medical information using the [employer] priyank, which is available for free in the [employer] store or Google Play store.     [employer] provides the following levels of access (as shown below):   My Chart Features   [facility] Primary Care Doctor [facility]  Specialists [facility]  Urgent  Care Non-[facility]  Primary Care  Doctor   Email your healthcare team securely and privately 24/7 X X X    Manage appointments: schedule your next appointment; view details of past/upcoming appointments X      Request prescription refills. X      View recent personal medical records, including lab and immunizations X X X X   View health record, including health history, allergies, medications X X X X   Read reports about your outpatient visits, procedures, consult and ER notes X X X X   See your discharge summary, which is a recap of your hospital and/or ER visit that includes your diagnosis, lab results, and care plan. X X       How to register for [employer]:  1. Go to  [email or web address].  2. Click on the Sign Up Now box, which takes you to the New Member Sign Up page. You will need to provide the following information:  a. Enter your [employer] Access Code exactly as it appears at the top of this page. (You will not need to use this code after you’ve completed the sign-up process. If you do not sign up before the expiration date, you must request a new code.)   b. Enter your date of birth.   c. Enter your home email address.   d. Click Submit, and follow the next screen’s instructions.  3. Create a [employer] ID. This will be your [employer] login ID and cannot be  changed, so think of one that is secure and easy to remember.  4. Create a [employer] password. You can change your password at any time.  5. Enter your Password Reset Question and Answer. This can be used at a later time if you forget your password.   6. Enter your e-mail address. This allows you to receive e-mail notifications when new information is available in [employer].  7. Click Sign Up. You can now view your health information.    For assistance activating your [employer] account, call [phone]

## 2017-08-30 PROBLEM — D49.2 NEOPLASM OF UNSPECIFIED BEHAVIOR OF BONE, SOFT TISSUE, AND SKIN: Status: RESOLVED | Noted: 2017-08-16 | Resolved: 2017-08-30

## 2017-09-22 ENCOUNTER — ANTICOAGULATION VISIT (OUTPATIENT)
Dept: MEDICAL GROUP | Facility: PHYSICIAN GROUP | Age: 82
End: 2017-09-22
Payer: MEDICARE

## 2017-09-22 VITALS — DIASTOLIC BLOOD PRESSURE: 78 MMHG | HEART RATE: 78 BPM | SYSTOLIC BLOOD PRESSURE: 125 MMHG

## 2017-09-22 DIAGNOSIS — Z95.2 HISTORY OF HEART VALVE REPLACEMENT WITH MECHANICAL VALVE: ICD-10-CM

## 2017-09-22 LAB — INR PPP: 2.6 (ref 2–3.5)

## 2017-09-22 PROCEDURE — 85610 PROTHROMBIN TIME: CPT | Performed by: NURSE PRACTITIONER

## 2017-09-22 NOTE — PROGRESS NOTES
Anticoagulation Summary  As of 9/22/2017    INR goal:   2.5-3.5   TTR:   52.2 % (2.2 y)   Today's INR:   2.6   Maintenance plan:   2.5 mg (5 mg x 0.5) on Tue; 5 mg (5 mg x 1) all other days   Weekly total:   32.5 mg   Plan last modified:   Loyd Moses PharmD (9/22/2017)   Next INR check:   10/6/2017   Target end date:   Indefinite    Indications    CVA (cerebral vascular accident) (CMS-HCC) [I63.9]  History of heart valve replacement with mechanical valve [V43.3] [Z95.2]             Anticoagulation Episode Summary     INR check location:       Preferred lab:       Send INR reminders to:       Comments:         Anticoagulation Care Providers     Provider Role Specialty Phone number    Caterina HolderD Responsible          Anticoagulation Patient Findings     Lab Results   Component Value Date/Time    SODIUM 137 09/25/2015 11:09 AM    POTASSIUM 3.8 09/25/2015 11:09 AM    CHLORIDE 105 09/25/2015 11:09 AM    CO2 23 09/25/2015 11:09 AM    GLUCOSE 233 (H) 09/25/2015 11:09 AM    BUN 34 (H) 09/25/2015 11:09 AM    CREATININE 1.37 09/25/2015 11:09 AM        HPI:  Adrienne Mejia seen in clinic today, on anticoagulation therapy with warfarin for MVR and CVA  Changes to current medical/health status since last appt: has a biopsy on 10/4/2017 and will need to hold warfarin for 5 days and use Lovenox 120mg once daily   Denies signs/symptoms of bleeding and/or thrombosis since the last appt.    Denies any interval changes to diet  Denies any interval changes to medications since last appt.   Denies any complications or cost restrictions with current therapy.   BP recorded in vitals.    A/P   INR  therapeutic.   Hold for 5 days starting 9/29 and Lovenox 30-2nd, then resume warfarin and Lovenox the next day as seen    Follow up appointment in 2 week(s).     Loyd Moses, [place]

## 2017-09-25 ENCOUNTER — ANTICOAGULATION MONITORING (OUTPATIENT)
Dept: VASCULAR LAB | Facility: MEDICAL CENTER | Age: 82
End: 2017-09-25

## 2017-09-25 VITALS — WEIGHT: 140 LBS | BODY MASS INDEX: 25.61 KG/M2

## 2017-09-25 DIAGNOSIS — Z95.2 HISTORY OF HEART VALVE REPLACEMENT WITH MECHANICAL VALVE: ICD-10-CM

## 2017-09-25 NOTE — PROGRESS NOTES
Changed the dose of Lovenox to 100mg once daily due to pts current weight of about 140lbs.     .Loyd Moses, PharmD

## 2017-10-04 ENCOUNTER — APPOINTMENT (RX ONLY)
Dept: URBAN - METROPOLITAN AREA CLINIC 31 | Facility: CLINIC | Age: 82
Setting detail: DERMATOLOGY
End: 2017-10-04

## 2017-10-04 PROBLEM — Z85.820 PERSONAL HISTORY OF MALIGNANT MELANOMA OF SKIN: Status: ACTIVE | Noted: 2017-10-04

## 2017-10-04 PROBLEM — C44.319 BASAL CELL CARCINOMA OF SKIN OF OTHER PARTS OF FACE: Status: ACTIVE | Noted: 2017-10-04

## 2017-10-04 PROCEDURE — 13132 CMPLX RPR F/C/C/M/N/AX/G/H/F: CPT

## 2017-10-04 PROCEDURE — ? EXCISION

## 2017-10-04 PROCEDURE — 11642 EXC F/E/E/N/L MAL+MRG 1.1-2: CPT

## 2017-10-04 NOTE — PROCEDURE: EXCISION
Complex Repair And Dermal Autograft Text: The defect edges were debeveled with a #15 scalpel blade.  The primary defect was closed partially with a complex linear closure.  Given the location of the defect, shape of the defect and the proximity to free margins an dermal autograft was deemed most appropriate to repair the remaining defect.  The graft was trimmed to fit the size of the remaining defect.  The graft was then placed in the primary defect, oriented appropriately, and sutured into place.
Complex Repair And Tissue Cultured Epidermal Autograft Text: The defect edges were debeveled with a #15 scalpel blade.  The primary defect was closed partially with a complex linear closure.  Given the location of the defect, shape of the defect and the proximity to free margins an tissue cultured epidermal autograft was deemed most appropriate to repair the remaining defect.  The graft was trimmed to fit the size of the remaining defect.  The graft was then placed in the primary defect, oriented appropriately, and sutured into place.
Size Of Margin In Cm: 0.4
Cartilage Graft Text: The defect edges were debeveled with a #15 scalpel blade.  Given the location of the defect, shape of the defect, the fact the defect involved a full thickness cartilage defect a cartilage graft was deemed most appropriate.  An appropriate donor site was identified, cleansed, and anesthetized. The cartilage graft was then harvested and transferred to the recipient site, oriented appropriately and then sutured into place.  The secondary defect was then repaired using a primary closure.
Complex Repair And Split-Thickness Skin Graft Text: The defect edges were debeveled with a #15 scalpel blade.  The primary defect was closed partially with a complex linear closure.  Given the location of the defect, shape of the defect and the proximity to free margins a split thickness skin graft was deemed most appropriate to repair the remaining defect.  The graft was trimmed to fit the size of the remaining defect.  The graft was then placed in the primary defect, oriented appropriately, and sutured into place.
Complex Repair And Melolabial Flap Text: The defect edges were debeveled with a #15 scalpel blade.  The primary defect was closed partially with a complex linear closure.  Given the location of the remaining defect, shape of the defect and the proximity to free margins a melolabial flap was deemed most appropriate for complete closure of the defect.  Using a sterile surgical marker, an appropriate advancement flap was drawn incorporating the defect and placing the expected incisions within the relaxed skin tension lines where possible.    The area thus outlined was incised deep to adipose tissue with a #15 scalpel blade.  The skin margins were undermined to an appropriate distance in all directions utilizing iris scissors.
Modified Advancement Flap Text: The defect edges were debeveled with a #15 scalpel blade.  Given the location of the defect, shape of the defect and the proximity to free margins a modified advancement flap was deemed most appropriate.  Using a sterile surgical marker, an appropriate advancement flap was drawn incorporating the defect and placing the expected incisions within the relaxed skin tension lines where possible.    The area thus outlined was incised deep to adipose tissue with a #15 scalpel blade.  The skin margins were undermined to an appropriate distance in all directions utilizing iris scissors.
Advancement-Rotation Flap Text: The defect edges were debeveled with a #15 scalpel blade.  Given the location of the defect, shape of the defect and the proximity to free margins an advancement-rotation flap was deemed most appropriate.  Using a sterile surgical marker, an appropriate flap was drawn incorporating the defect and placing the expected incisions within the relaxed skin tension lines where possible. The area thus outlined was incised deep to adipose tissue with a #15 scalpel blade.  The skin margins were undermined to an appropriate distance in all directions utilizing iris scissors.
Second Skin Substitute Units (Will Override Primary Defect Units If Greater Than 0): 0
Additional Anesthesia Volume In Cc: 10
Island Pedicle Flap Text: The defect edges were debeveled with a #15 scalpel blade.  Given the location of the defect, shape of the defect and the proximity to free margins an island pedicle advancement flap was deemed most appropriate.  Using a sterile surgical marker, an appropriate advancement flap was drawn incorporating the defect, outlining the appropriate donor tissue and placing the expected incisions within the relaxed skin tension lines where possible.    The area thus outlined was incised deep to adipose tissue with a #15 scalpel blade.  The skin margins were undermined to an appropriate distance in all directions around the primary defect and laterally outward around the island pedicle utilizing iris scissors.  There was minimal undermining beneath the pedicle flap.
Advancement Flap (Single) Text: The defect edges were debeveled with a #15 scalpel blade.  Given the location of the defect and the proximity to free margins a single advancement flap was deemed most appropriate.  Using a sterile surgical marker, an appropriate advancement flap was drawn incorporating the defect and placing the expected incisions within the relaxed skin tension lines where possible.    The area thus outlined was incised deep to adipose tissue with a #15 scalpel blade.  The skin margins were undermined to an appropriate distance in all directions utilizing iris scissors.
Excisional Biopsy Additional Text (Leave Blank If You Do Not Want): The margin was drawn around the clinically apparent lesion. An elliptical shape was then drawn on the skin incorporating the lesion and margins.  Incisions were then made along these lines to the appropriate tissue plane and the lesion was extirpated.
Interpolation Flap Text: A decision was made to reconstruct the defect utilizing an interpolation axial flap and a staged reconstruction.  A telfa template was made of the defect.  This telfa template was then used to outline the interpolation flap.  The donor area for the pedicle flap was then injected with anesthesia.  The flap was excised through the skin and subcutaneous tissue down to the layer of the underlying musculature.  The interpolation flap was carefully excised within this deep plane to maintain its blood supply.  The edges of the donor site were undermined.   The donor site was closed in a primary fashion.  The pedicle was then rotated into position and sutured.  Once the tube was sutured into place, adequate blood supply was confirmed with blanching and refill.  The pedicle was then wrapped with xeroform gauze and dressed appropriately with a telfa and gauze bandage to ensure continued blood supply and protect the attached pedicle.
Curettage Prior To Excision?: No
Complex Repair And M Plasty Text: The defect edges were debeveled with a #15 scalpel blade.  The primary defect was closed partially with a complex linear closure.  Given the location of the remaining defect, shape of the defect and the proximity to free margins an M plasty was deemed most appropriate for complete closure of the defect.  Using a sterile surgical marker, an appropriate advancement flap was drawn incorporating the defect and placing the expected incisions within the relaxed skin tension lines where possible.    The area thus outlined was incised deep to adipose tissue with a #15 scalpel blade.  The skin margins were undermined to an appropriate distance in all directions utilizing iris scissors.
Intermediate / Complex Repair - Final Wound Length In Cm: 3.3
Elliptical Excision Additional Text (Leave Blank If You Do Not Want): The margin was drawn around the clinically apparent lesion.  An elliptical shape was then drawn on the skin incorporating the lesion and margins.  Incisions were then made along these lines to the appropriate tissue plane and the lesion was extirpated.
Paramedian Forehead Flap Text: A decision was made to reconstruct the defect utilizing an interpolation axial flap and a staged reconstruction.  A telfa template was made of the defect.  This telfa template was then used to outline the paramedian forehead pedicle flap.  The donor area for the pedicle flap was then injected with anesthesia.  The flap was excised through the skin and subcutaneous tissue down to the layer of the underlying musculature.  The pedicle flap was carefully excised within this deep plane to maintain its blood supply.  The edges of the donor site were undermined.   The donor site was closed in a primary fashion.  The pedicle was then rotated into position and sutured.  Once the tube was sutured into place, adequate blood supply was confirmed with blanching and refill.  The pedicle was then wrapped with xeroform gauze and dressed appropriately with a telfa and gauze bandage to ensure continued blood supply and protect the attached pedicle.
Show Accession Variable: Yes
Curvilinear Excision Additional Text (Leave Blank If You Do Not Want): The margin was drawn around the clinically apparent lesion.  A curvilinear shape was then drawn on the skin incorporating the lesion and margins.  Incisions were then made along these lines to the appropriate tissue plane and the lesion was extirpated.
Star Wedge Flap Text: The defect edges were debeveled with a #15 scalpel blade.  Given the location of the defect, shape of the defect and the proximity to free margins a star wedge flap was deemed most appropriate.  Using a sterile surgical marker, an appropriate rotation flap was drawn incorporating the defect and placing the expected incisions within the relaxed skin tension lines where possible. The area thus outlined was incised deep to adipose tissue with a #15 scalpel blade.  The skin margins were undermined to an appropriate distance in all directions utilizing iris scissors.
H Plasty Text: Given the location of the defect, shape of the defect and the proximity to free margins a H-plasty was deemed most appropriate for repair.  Using a sterile surgical marker, the appropriate advancement arms of the H-plasty were drawn incorporating the defect and placing the expected incisions within the relaxed skin tension lines where possible. The area thus outlined was incised deep to adipose tissue with a #15 scalpel blade. The skin margins were undermined to an appropriate distance in all directions utilizing iris scissors.  The opposing advancement arms were then advanced into place in opposite direction and anchored with interrupted buried subcutaneous sutures.
Posterior Auricular Interpolation Flap Text: A decision was made to reconstruct the defect utilizing an interpolation axial flap and a staged reconstruction.  A telfa template was made of the defect.  This telfa template was then used to outline the posterior auricular interpolation flap.  The donor area for the pedicle flap was then injected with anesthesia.  The flap was excised through the skin and subcutaneous tissue down to the layer of the underlying musculature.  The pedicle flap was carefully excised within this deep plane to maintain its blood supply.  The edges of the donor site were undermined.   The donor site was closed in a primary fashion.  The pedicle was then rotated into position and sutured.  Once the tube was sutured into place, adequate blood supply was confirmed with blanching and refill.  The pedicle was then wrapped with xeroform gauze and dressed appropriately with a telfa and gauze bandage to ensure continued blood supply and protect the attached pedicle.
Split-Thickness Skin Graft Text: The defect edges were debeveled with a #15 scalpel blade.  Given the location of the defect, shape of the defect and the proximity to free margins a split thickness skin graft was deemed most appropriate.  Using a sterile surgical marker, the primary defect shape was transferred to the donor site. The split thickness graft was then harvested.  The skin graft was then placed in the primary defect and oriented appropriately.
Complex Repair And Epidermal Autograft Text: The defect edges were debeveled with a #15 scalpel blade.  The primary defect was closed partially with a complex linear closure.  Given the location of the defect, shape of the defect and the proximity to free margins an epidermal autograft was deemed most appropriate to repair the remaining defect.  The graft was trimmed to fit the size of the remaining defect.  The graft was then placed in the primary defect, oriented appropriately, and sutured into place.
Dressing: pressure dressing with telfa
V-Y Plasty Text: The defect edges were debeveled with a #15 scalpel blade.  Given the location of the defect, shape of the defect and the proximity to free margins an V-Y advancement flap was deemed most appropriate.  Using a sterile surgical marker, an appropriate advancement flap was drawn incorporating the defect and placing the expected incisions within the relaxed skin tension lines where possible.    The area thus outlined was incised deep to adipose tissue with a #15 scalpel blade.  The skin margins were undermined to an appropriate distance in all directions utilizing iris scissors.
No Repair - Repaired With Adjacent Surgical Defect Text (Leave Blank If You Do Not Want): After the excision the defect was repaired concurrently with another surgical defect which was in close approximation.
Complex Repair And A-T Advancement Flap Text: The defect edges were debeveled with a #15 scalpel blade.  The primary defect was closed partially with a complex linear closure.  Given the location of the remaining defect, shape of the defect and the proximity to free margins an A-T advancement flap was deemed most appropriate for complete closure of the defect.  Using a sterile surgical marker, an appropriate advancement flap was drawn incorporating the defect and placing the expected incisions within the relaxed skin tension lines where possible.    The area thus outlined was incised deep to adipose tissue with a #15 scalpel blade.  The skin margins were undermined to an appropriate distance in all directions utilizing iris scissors.
Perilesional Excision Additional Text (Leave Blank If You Do Not Want): The margin was drawn around the clinically apparent lesion. Incisions were then made along these lines to the appropriate tissue plane and the lesion was extirpated.
Double Island Pedicle Flap Text: The defect edges were debeveled with a #15 scalpel blade.  Given the location of the defect, shape of the defect and the proximity to free margins a double island pedicle advancement flap was deemed most appropriate.  Using a sterile surgical marker, an appropriate advancement flap was drawn incorporating the defect, outlining the appropriate donor tissue and placing the expected incisions within the relaxed skin tension lines where possible.    The area thus outlined was incised deep to adipose tissue with a #15 scalpel blade.  The skin margins were undermined to an appropriate distance in all directions around the primary defect and laterally outward around the island pedicle utilizing iris scissors.  There was minimal undermining beneath the pedicle flap.
Tissue Cultured Epidermal Autograft Text: The defect edges were debeveled with a #15 scalpel blade.  Given the location of the defect, shape of the defect and the proximity to free margins a tissue cultured epidermal autograft was deemed most appropriate.  The graft was then trimmed to fit the size of the defect.  The graft was then placed in the primary defect and oriented appropriately.
Graft Donor Site Bandage (Optional-Leave Blank If You Don't Want In Note): Steri-strips and a pressure bandage were applied to the donor site.
Cheek-To-Nose Interpolation Flap Text: A decision was made to reconstruct the defect utilizing an interpolation axial flap and a staged reconstruction.  A telfa template was made of the defect.  This telfa template was then used to outline the Cheek-To-Nose Interpolation flap.  The donor area for the pedicle flap was then injected with anesthesia.  The flap was excised through the skin and subcutaneous tissue down to the layer of the underlying musculature.  The interpolation flap was carefully excised within this deep plane to maintain its blood supply.  The edges of the donor site were undermined.   The donor site was closed in a primary fashion.  The pedicle was then rotated into position and sutured.  Once the tube was sutured into place, adequate blood supply was confirmed with blanching and refill.  The pedicle was then wrapped with xeroform gauze and dressed appropriately with a telfa and gauze bandage to ensure continued blood supply and protect the attached pedicle.
Billing Type: Third-Party Bill
Rhombic Flap Text: The defect edges were debeveled with a #15 scalpel blade.  Given the location of the defect and the proximity to free margins a rhombic flap was deemed most appropriate.  Using a sterile surgical marker, an appropriate rhombic flap was drawn incorporating the defect.    The area thus outlined was incised deep to adipose tissue with a #15 scalpel blade.  The skin margins were undermined to an appropriate distance in all directions utilizing iris scissors.
Bilobed Flap Text: The defect edges were debeveled with a #15 scalpel blade.  Given the location of the defect and the proximity to free margins a bilobe flap was deemed most appropriate.  Using a sterile surgical marker, an appropriate bilobe flap drawn around the defect.    The area thus outlined was incised deep to adipose tissue with a #15 scalpel blade.  The skin margins were undermined to an appropriate distance in all directions utilizing iris scissors.
Repair Performed By Another Provider Text (Leave Blank If You Do Not Want): After the tissue was excised the defect was repaired by another provider.
Epidermal Closure Graft Donor Site (Optional): simple interrupted
Complex Repair And Bilobe Flap Text: The defect edges were debeveled with a #15 scalpel blade.  The primary defect was closed partially with a complex linear closure.  Given the location of the remaining defect, shape of the defect and the proximity to free margins a bilobe flap was deemed most appropriate for complete closure of the defect.  Using a sterile surgical marker, an appropriate advancement flap was drawn incorporating the defect and placing the expected incisions within the relaxed skin tension lines where possible.    The area thus outlined was incised deep to adipose tissue with a #15 scalpel blade.  The skin margins were undermined to an appropriate distance in all directions utilizing iris scissors.
Cheek Interpolation Flap Text: A decision was made to reconstruct the defect utilizing an interpolation axial flap and a staged reconstruction.  A telfa template was made of the defect.  This telfa template was then used to outline the Cheek Interpolation flap.  The donor area for the pedicle flap was then injected with anesthesia.  The flap was excised through the skin and subcutaneous tissue down to the layer of the underlying musculature.  The interpolation flap was carefully excised within this deep plane to maintain its blood supply.  The edges of the donor site were undermined.   The donor site was closed in a primary fashion.  The pedicle was then rotated into position and sutured.  Once the tube was sutured into place, adequate blood supply was confirmed with blanching and refill.  The pedicle was then wrapped with xeroform gauze and dressed appropriately with a telfa and gauze bandage to ensure continued blood supply and protect the attached pedicle.
Complex Repair And Xenograft Text: The defect edges were debeveled with a #15 scalpel blade.  The primary defect was closed partially with a complex linear closure.  Given the location of the defect, shape of the defect and the proximity to free margins a xenograft was deemed most appropriate to repair the remaining defect.  The graft was trimmed to fit the size of the remaining defect.  The graft was then placed in the primary defect, oriented appropriately, and sutured into place.
Dermal Autograft Text: The defect edges were debeveled with a #15 scalpel blade.  Given the location of the defect, shape of the defect and the proximity to free margins a dermal autograft was deemed most appropriate.  Using a sterile surgical marker, the primary defect shape was transferred to the donor site. The area thus outlined was incised deep to adipose tissue with a #15 scalpel blade.  The harvested graft was then trimmed of adipose and epidermal tissue until only dermis was left.  The skin graft was then placed in the primary defect and oriented appropriately.
Complex Repair And Skin Substitute Graft Text: The defect edges were debeveled with a #15 scalpel blade.  The primary defect was closed partially with a complex linear closure.  Given the location of the remaining defect, shape of the defect and the proximity to free margins a skin substitute graft was deemed most appropriate to repair the remaining defect.  The graft was trimmed to fit the size of the remaining defect.  The graft was then placed in the primary defect, oriented appropriately, and sutured into place.
Z Plasty Text: The lesion was extirpated to the level of the fat with a #15 scalpel blade.  Given the location of the defect, shape of the defect and the proximity to free margins a Z-plasty was deemed most appropriate for repair.  Using a sterile surgical marker, the appropriate transposition arms of the Z-plasty were drawn incorporating the defect and placing the expected incisions within the relaxed skin tension lines where possible.    The area thus outlined was incised deep to adipose tissue with a #15 scalpel blade.  The skin margins were undermined to an appropriate distance in all directions utilizing iris scissors.  The opposing transposition arms were then transposed into place in opposite direction and anchored with interrupted buried subcutaneous sutures.
Transposition Flap Text: The defect edges were debeveled with a #15 scalpel blade.  Given the location of the defect and the proximity to free margins a transposition flap was deemed most appropriate.  Using a sterile surgical marker, an appropriate transposition flap was drawn incorporating the defect.    The area thus outlined was incised deep to adipose tissue with a #15 scalpel blade.  The skin margins were undermined to an appropriate distance in all directions utilizing iris scissors.
Melolabial Transposition Flap Text: The defect edges were debeveled with a #15 scalpel blade.  Given the location of the defect and the proximity to free margins a melolabial flap was deemed most appropriate.  Using a sterile surgical marker, an appropriate melolabial transposition flap was drawn incorporating the defect.    The area thus outlined was incised deep to adipose tissue with a #15 scalpel blade.  The skin margins were undermined to an appropriate distance in all directions utilizing iris scissors.
Wound Check: 14 days
Island Pedicle Flap-Requiring Vessel Identification Text: The defect edges were debeveled with a #15 scalpel blade.  Given the location of the defect, shape of the defect and the proximity to free margins an island pedicle advancement flap was deemed most appropriate.  Using a sterile surgical marker, an appropriate advancement flap was drawn, based on the axial vessel mentioned above, incorporating the defect, outlining the appropriate donor tissue and placing the expected incisions within the relaxed skin tension lines where possible.    The area thus outlined was incised deep to adipose tissue with a #15 scalpel blade.  The skin margins were undermined to an appropriate distance in all directions around the primary defect and laterally outward around the island pedicle utilizing iris scissors.  There was minimal undermining beneath the pedicle flap.
O-L Flap Text: The defect edges were debeveled with a #15 scalpel blade.  Given the location of the defect, shape of the defect and the proximity to free margins an O-L flap was deemed most appropriate.  Using a sterile surgical marker, an appropriate advancement flap was drawn incorporating the defect and placing the expected incisions within the relaxed skin tension lines where possible.    The area thus outlined was incised deep to adipose tissue with a #15 scalpel blade.  The skin margins were undermined to an appropriate distance in all directions utilizing iris scissors.
Mucosal Advancement Flap Text: Given the location of the defect, shape of the defect and the proximity to free margins a mucosal advancement flap was deemed most appropriate. Incisions were made with a 15 blade scalpel in the appropriate fashion along the cutaneous vermilion border and the mucosal lip. The remaining actinically damaged mucosal tissue was excised.  The mucosal advancement flap was then elevated to the gingival sulcus with care taken to preserve the neurovascular structures and advanced into the primary defect. Care was taken to ensure that precise realignment of the vermilion border was achieved.
Island Pedicle Flap With Canthal Suspension Text: The defect edges were debeveled with a #15 scalpel blade.  Given the location of the defect, shape of the defect and the proximity to free margins an island pedicle advancement flap was deemed most appropriate.  Using a sterile surgical marker, an appropriate advancement flap was drawn incorporating the defect, outlining the appropriate donor tissue and placing the expected incisions within the relaxed skin tension lines where possible. The area thus outlined was incised deep to adipose tissue with a #15 scalpel blade.  The skin margins were undermined to an appropriate distance in all directions around the primary defect and laterally outward around the island pedicle utilizing iris scissors.  There was minimal undermining beneath the pedicle flap. A suspension suture was placed in the canthal tendon to prevent tension and prevent ectropion.
S Plasty Text: Given the location and shape of the defect, and the orientation of relaxed skin tension lines, an S-plasty was deemed most appropriate for repair.  Using a sterile surgical marker, the appropriate outline of the S-plasty was drawn, incorporating the defect and placing the expected incisions within the relaxed skin tension lines where possible.  The area thus outlined was incised deep to adipose tissue with a #15 scalpel blade.  The skin margins were undermined to an appropriate distance in all directions utilizing iris scissors. The skin flaps were advanced over the defect.  The opposing margins were then approximated with interrupted buried subcutaneous sutures.
Fusiform Excision Additional Text (Leave Blank If You Do Not Want): The margin was drawn around the clinically apparent lesion.  A fusiform shape was then drawn on the skin incorporating the lesion and margins.  Incisions were then made along these lines to the appropriate tissue plane and the lesion was extirpated.
Detail Level: Detailed
Alar Island Pedicle Flap Text: The defect edges were debeveled with a #15 scalpel blade.  Given the location of the defect, shape of the defect and the proximity to the alar rim an island pedicle advancement flap was deemed most appropriate.  Using a sterile surgical marker, an appropriate advancement flap was drawn incorporating the defect, outlining the appropriate donor tissue and placing the expected incisions within the nasal ala running parallel to the alar rim. The area thus outlined was incised with a #15 scalpel blade.  The skin margins were undermined minimally to an appropriate distance in all directions around the primary defect and laterally outward around the island pedicle utilizing iris scissors.  There was minimal undermining beneath the pedicle flap.
Excision Depth: adipose tissue
Excision Method: Elliptical
Lip Wedge Excision Repair Text: Given the location of the defect and the proximity to free margins a full thickness wedge repair was deemed most appropriate.  Using a sterile surgical marker, the appropriate repair was drawn incorporating the defect and placing the expected incisions perpendicular to the vermilion border.  The vermilion border was also meticulously outlined to ensure appropriate reapproximation during the repair.  The area thus outlined was incised through and through with a #15 scalpel blade.  The muscularis and dermis were reaproximated with deep sutures following hemostasis. Care was taken to realign the vermilion border before proceeding with the superficial closure.  Once the vermilion was realigned the superfical and mucosal closure was finished.
Complex Repair And V-Y Plasty Text: The defect edges were debeveled with a #15 scalpel blade.  The primary defect was closed partially with a complex linear closure.  Given the location of the remaining defect, shape of the defect and the proximity to free margins a V-Y plasty was deemed most appropriate for complete closure of the defect.  Using a sterile surgical marker, an appropriate advancement flap was drawn incorporating the defect and placing the expected incisions within the relaxed skin tension lines where possible.    The area thus outlined was incised deep to adipose tissue with a #15 scalpel blade.  The skin margins were undermined to an appropriate distance in all directions utilizing iris scissors.
Skin Substitute Text: The defect edges were debeveled with a #15 scalpel blade.  Given the location of the defect, shape of the defect and the proximity to free margins a skin substitute graft was deemed most appropriate.  The graft material was trimmed to fit the size of the defect. The graft was then placed in the primary defect and oriented appropriately.
Complex Repair And O-T Advancement Flap Text: The defect edges were debeveled with a #15 scalpel blade.  The primary defect was closed partially with a complex linear closure.  Given the location of the remaining defect, shape of the defect and the proximity to free margins an O-T advancement flap was deemed most appropriate for complete closure of the defect.  Using a sterile surgical marker, an appropriate advancement flap was drawn incorporating the defect and placing the expected incisions within the relaxed skin tension lines where possible.    The area thus outlined was incised deep to adipose tissue with a #15 scalpel blade.  The skin margins were undermined to an appropriate distance in all directions utilizing iris scissors.
Muscle Hinge Flap Text: The defect edges were debeveled with a #15 scalpel blade.  Given the size, depth and location of the defect and the proximity to free margins a muscle hinge flap was deemed most appropriate.  Using a sterile surgical marker, an appropriate hinge flap was drawn incorporating the defect. The area thus outlined was incised with a #15 scalpel blade.  The skin margins were undermined to an appropriate distance in all directions utilizing iris scissors.
Anesthesia Type: 1% lidocaine with epinephrine
Estimated Blood Loss (Cc): minimal
Ear Star Wedge Flap Text: The defect edges were debeveled with a #15 blade scalpel.  Given the location of the defect and the proximity to free margins (helical rim) an ear star wedge flap was deemed most appropriate.  Using a sterile surgical marker, the appropriate flap was drawn incorporating the defect and placing the expected incisions between the helical rim and antihelix where possible.  The area thus outlined was incised through and through with a #15 scalpel blade.
Partial Purse String (Intermediate) Text: Given the location of the defect and the characteristics of the surrounding skin an intermediate purse string closure was deemed most appropriate.  Undermining was performed circumferentially around the surgical defect.  A purse string suture was then placed and tightened. Wound tension of the circular defect prevented complete closure of the wound.
Trilobed Flap Text: The defect edges were debeveled with a #15 scalpel blade.  Given the location of the defect and the proximity to free margins a trilobed flap was deemed most appropriate.  Using a sterile surgical marker, an appropriate trilobed flap drawn around the defect.    The area thus outlined was incised deep to adipose tissue with a #15 scalpel blade.  The skin margins were undermined to an appropriate distance in all directions utilizing iris scissors.
Spiral Flap Text: The defect edges were debeveled with a #15 scalpel blade.  Given the location of the defect, shape of the defect and the proximity to free margins a spiral flap was deemed most appropriate.  Using a sterile surgical marker, an appropriate rotation flap was drawn incorporating the defect and placing the expected incisions within the relaxed skin tension lines where possible. The area thus outlined was incised deep to adipose tissue with a #15 scalpel blade.  The skin margins were undermined to an appropriate distance in all directions utilizing iris scissors.
Complex Repair And Rhombic Flap Text: The defect edges were debeveled with a #15 scalpel blade.  The primary defect was closed partially with a complex linear closure.  Given the location of the remaining defect, shape of the defect and the proximity to free margins a rhombic flap was deemed most appropriate for complete closure of the defect.  Using a sterile surgical marker, an appropriate advancement flap was drawn incorporating the defect and placing the expected incisions within the relaxed skin tension lines where possible.    The area thus outlined was incised deep to adipose tissue with a #15 scalpel blade.  The skin margins were undermined to an appropriate distance in all directions utilizing iris scissors.
Rotation Flap Text: The defect edges were debeveled with a #15 scalpel blade.  Given the location of the defect, shape of the defect and the proximity to free margins a rotation flap was deemed most appropriate.  Using a sterile surgical marker, an appropriate rotation flap was drawn incorporating the defect and placing the expected incisions within the relaxed skin tension lines where possible.    The area thus outlined was incised deep to adipose tissue with a #15 scalpel blade.  The skin margins were undermined to an appropriate distance in all directions utilizing iris scissors.
Purse String (Intermediate) Text: Given the location of the defect and the characteristics of the surrounding skin a purse string intermediate closure was deemed most appropriate.  Undermining was performed circumfirentially around the surgical defect.  A purse string suture was then placed and tightened.
Complex Repair And O-L Flap Text: The defect edges were debeveled with a #15 scalpel blade.  The primary defect was closed partially with a complex linear closure.  Given the location of the remaining defect, shape of the defect and the proximity to free margins an O-L flap was deemed most appropriate for complete closure of the defect.  Using a sterile surgical marker, an appropriate flap was drawn incorporating the defect and placing the expected incisions within the relaxed skin tension lines where possible.    The area thus outlined was incised deep to adipose tissue with a #15 scalpel blade.  The skin margins were undermined to an appropriate distance in all directions utilizing iris scissors.
Lab: [id number]
O-T Plasty Text: The defect edges were debeveled with a #15 scalpel blade.  Given the location of the defect, shape of the defect and the proximity to free margins an O-T plasty was deemed most appropriate.  Using a sterile surgical marker, an appropriate O-T plasty was drawn incorporating the defect and placing the expected incisions within the relaxed skin tension lines where possible.    The area thus outlined was incised deep to adipose tissue with a #15 scalpel blade.  The skin margins were undermined to an appropriate distance in all directions utilizing iris scissors.
Epidermal Sutures: 3-0 Nylon
Ftsg Text: The defect edges were debeveled with a #15 scalpel blade.  Given the location of the defect, shape of the defect and the proximity to free margins a full thickness skin graft was deemed most appropriate.  Using a sterile surgical marker, the primary defect shape was transferred to the donor site. The area thus outlined was incised deep to adipose tissue with a #15 scalpel blade.  The harvested graft was then trimmed of adipose tissue until only dermis and epidermis was left.  The skin margins of the secondary defect were undermined to an appropriate distance in all directions utilizing iris scissors.  The secondary defect was closed with interrupted buried subcutaneous sutures.  The skin edges were then re-apposed with running  sutures.  The skin graft was then placed in the primary defect and oriented appropriately.
Repair Type: Complex
Complex Repair And Z Plasty Text: The defect edges were debeveled with a #15 scalpel blade.  The primary defect was closed partially with a complex linear closure.  Given the location of the remaining defect, shape of the defect and the proximity to free margins a Z plasty was deemed most appropriate for complete closure of the defect.  Using a sterile surgical marker, an appropriate advancement flap was drawn incorporating the defect and placing the expected incisions within the relaxed skin tension lines where possible.    The area thus outlined was incised deep to adipose tissue with a #15 scalpel blade.  The skin margins were undermined to an appropriate distance in all directions utilizing iris scissors.
Scalpel Size: 15 blade
Positioning (Leave Blank If You Do Not Want): The patient was placed in a comfortable position exposing the surgical site.
Helical Rim Advancement Flap Text: The defect edges were debeveled with a #15 blade scalpel.  Given the location of the defect and the proximity to free margins (helical rim) a double helical rim advancement flap was deemed most appropriate.  Using a sterile surgical marker, the appropriate advancement flaps were drawn incorporating the defect and placing the expected incisions between the helical rim and antihelix where possible.  The area thus outlined was incised through and through with a #15 scalpel blade.  With a skin hook and iris scissors, the flaps were gently and sharply undermined and freed up.
Complex Repair And Dorsal Nasal Flap Text: The defect edges were debeveled with a #15 scalpel blade.  The primary defect was closed partially with a complex linear closure.  Given the location of the remaining defect, shape of the defect and the proximity to free margins a dorsal nasal flap was deemed most appropriate for complete closure of the defect.  Using a sterile surgical marker, an appropriate flap was drawn incorporating the defect and placing the expected incisions within the relaxed skin tension lines where possible.    The area thus outlined was incised deep to adipose tissue with a #15 scalpel blade.  The skin margins were undermined to an appropriate distance in all directions utilizing iris scissors.
Saucerization Excision Additional Text (Leave Blank If You Do Not Want): The margin was drawn around the clinically apparent lesion.  Incisions were then made along these lines, in a tangential fashion, to the appropriate tissue plane and the lesion was extirpated.
Composite Graft Text: The defect edges were debeveled with a #15 scalpel blade.  Given the location of the defect, shape of the defect, the proximity to free margins and the fact the defect was full thickness a composite graft was deemed most appropriate.  The defect was outline and then transferred to the donor site.  A full thickness graft was then excised from the donor site. The graft was then placed in the primary defect, oriented appropriately and then sutured into place.  The secondary defect was then repaired using a primary closure.
Epidermal Closure: running
Home Suture Removal Text: Patient was provided a home suture removal kit and will remove their sutures at home.  If they have any questions or difficulties they will call the office.
Size Of Lesion In Cm: 0.7
Lab Facility: 
Purse String (Simple) Text: Given the location of the defect and the characteristics of the surrounding skin a purse string simple closure was deemed most appropriate.  Undermining was performed circumferentially around the surgical defect.  A purse string suture was then placed and tightened.
Bilateral Helical Rim Advancement Flap Text: The defect edges were debeveled with a #15 blade scalpel.  Given the location of the defect and the proximity to free margins (helical rim) a bilateral helical rim advancement flap was deemed most appropriate.  Using a sterile surgical marker, the appropriate advancement flaps were drawn incorporating the defect and placing the expected incisions between the helical rim and antihelix where possible.  The area thus outlined was incised through and through with a #15 scalpel blade.  With a skin hook and iris scissors, the flaps were gently and sharply undermined and freed up.
Wound Care: Petrolatum
Complex Repair And Transposition Flap Text: The defect edges were debeveled with a #15 scalpel blade.  The primary defect was closed partially with a complex linear closure.  Given the location of the remaining defect, shape of the defect and the proximity to free margins a transposition flap was deemed most appropriate for complete closure of the defect.  Using a sterile surgical marker, an appropriate advancement flap was drawn incorporating the defect and placing the expected incisions within the relaxed skin tension lines where possible.    The area thus outlined was incised deep to adipose tissue with a #15 scalpel blade.  The skin margins were undermined to an appropriate distance in all directions utilizing iris scissors.
Surgeon Performing The Repair (Optional): Chris Jorgensen MD
Complex Repair And Double Advancement Flap Text: The defect edges were debeveled with a #15 scalpel blade.  The primary defect was closed partially with a complex linear closure.  Given the location of the remaining defect, shape of the defect and the proximity to free margins a double advancement flap was deemed most appropriate for complete closure of the defect.  Using a sterile surgical marker, an appropriate advancement flap was drawn incorporating the defect and placing the expected incisions within the relaxed skin tension lines where possible.    The area thus outlined was incised deep to adipose tissue with a #15 scalpel blade.  The skin margins were undermined to an appropriate distance in all directions utilizing iris scissors.
Keystone Flap Text: The defect edges were debeveled with a #15 scalpel blade.  Given the location of the defect, shape of the defect a keystone flap was deemed most appropriate.  Using a sterile surgical marker, an appropriate keystone flap was drawn incorporating the defect, outlining the appropriate donor tissue and placing the expected incisions within the relaxed skin tension lines where possible. The area thus outlined was incised deep to adipose tissue with a #15 scalpel blade.  The skin margins were undermined to an appropriate distance in all directions around the primary defect and laterally outward around the flap utilizing iris scissors.
Pre-Excision Curettage Text (Leave Blank If You Do Not Want): Prior to drawing the surgical margin the visible lesion was removed with electrodesiccation and curettage to clearly define the lesion size.
Deep Sutures: 3-0 Maxon
Complex Repair Preamble Text (Leave Blank If You Do Not Want): Extensive wide undermining was performed.
O-T Advancement Flap Text: The defect edges were debeveled with a #15 scalpel blade.  Given the location of the defect, shape of the defect and the proximity to free margins an O-T advancement flap was deemed most appropriate.  Using a sterile surgical marker, an appropriate advancement flap was drawn incorporating the defect and placing the expected incisions within the relaxed skin tension lines where possible.    The area thus outlined was incised deep to adipose tissue with a #15 scalpel blade.  The skin margins were undermined to an appropriate distance in all directions utilizing iris scissors.
Consent was obtained from the patient. The risks and benefits to therapy were discussed in detail. Specifically, the risks of infection, scarring, bleeding, prolonged wound healing, incomplete removal, allergy to anesthesia, nerve injury and recurrence were addressed. Prior to the procedure, the treatment site was clearly identified and confirmed by the patient. All components of Universal Protocol/PAUSE Rule completed.
Hemostasis: Electrocautery
Burow's Advancement Flap Text: The defect edges were debeveled with a #15 scalpel blade.  Given the location of the defect and the proximity to free margins a Burow's advancement flap was deemed most appropriate.  Using a sterile surgical marker, the appropriate advancement flap was drawn incorporating the defect and placing the expected incisions within the relaxed skin tension lines where possible.    The area thus outlined was incised deep to adipose tissue with a #15 scalpel blade.  The skin margins were undermined to an appropriate distance in all directions utilizing iris scissors.
Xenograft Text: The defect edges were debeveled with a #15 scalpel blade.  Given the location of the defect, shape of the defect and the proximity to free margins a xenograft was deemed most appropriate.  The graft was then trimmed to fit the size of the defect.  The graft was then placed in the primary defect and oriented appropriately.
Bilobed Transposition Flap Text: The defect edges were debeveled with a #15 scalpel blade.  Given the location of the defect and the proximity to free margins a bilobed transposition flap was deemed most appropriate.  Using a sterile surgical marker, an appropriate bilobe flap drawn around the defect.    The area thus outlined was incised deep to adipose tissue with a #15 scalpel blade.  The skin margins were undermined to an appropriate distance in all directions utilizing iris scissors.
Advancement Flap (Double) Text: The defect edges were debeveled with a #15 scalpel blade.  Given the location of the defect and the proximity to free margins a double advancement flap was deemed most appropriate.  Using a sterile surgical marker, the appropriate advancement flaps were drawn incorporating the defect and placing the expected incisions within the relaxed skin tension lines where possible.    The area thus outlined was incised deep to adipose tissue with a #15 scalpel blade.  The skin margins were undermined to an appropriate distance in all directions utilizing iris scissors.
Hatchet Flap Text: The defect edges were debeveled with a #15 scalpel blade.  Given the location of the defect, shape of the defect and the proximity to free margins a hatchet flap was deemed most appropriate.  Using a sterile surgical marker, an appropriate hatchet flap was drawn incorporating the defect and placing the expected incisions within the relaxed skin tension lines where possible.    The area thus outlined was incised deep to adipose tissue with a #15 scalpel blade.  The skin margins were undermined to an appropriate distance in all directions utilizing iris scissors.
Path Notes (To The Dermatopathologist): Please check margins.
Dorsal Nasal Flap Text: The defect edges were debeveled with a #15 scalpel blade.  Given the location of the defect and the proximity to free margins a dorsal nasal flap was deemed most appropriate.  Using a sterile surgical marker, an appropriate dorsal nasal flap was drawn around the defect.    The area thus outlined was incised deep to adipose tissue with a #15 scalpel blade.  The skin margins were undermined to an appropriate distance in all directions utilizing iris scissors.
Mastoid Interpolation Flap Text: A decision was made to reconstruct the defect utilizing an interpolation axial flap and a staged reconstruction.  A telfa template was made of the defect.  This telfa template was then used to outline the mastoid interpolation flap.  The donor area for the pedicle flap was then injected with anesthesia.  The flap was excised through the skin and subcutaneous tissue down to the layer of the underlying musculature.  The pedicle flap was carefully excised within this deep plane to maintain its blood supply.  The edges of the donor site were undermined.   The donor site was closed in a primary fashion.  The pedicle was then rotated into position and sutured.  Once the tube was sutured into place, adequate blood supply was confirmed with blanching and refill.  The pedicle was then wrapped with xeroform gauze and dressed appropriately with a telfa and gauze bandage to ensure continued blood supply and protect the attached pedicle.
Partial Purse String (Simple) Text: Given the location of the defect and the characteristics of the surrounding skin a simple purse string closure was deemed most appropriate.  Undermining was performed circumferentially around the surgical defect.  A purse string suture was then placed and tightened. Wound tension of the circular defect prevented complete closure of the wound.
Complex Repair And Double M Plasty Text: The defect edges were debeveled with a #15 scalpel blade.  The primary defect was closed partially with a complex linear closure.  Given the location of the remaining defect, shape of the defect and the proximity to free margins a double M plasty was deemed most appropriate for complete closure of the defect.  Using a sterile surgical marker, an appropriate advancement flap was drawn incorporating the defect and placing the expected incisions within the relaxed skin tension lines where possible.    The area thus outlined was incised deep to adipose tissue with a #15 scalpel blade.  The skin margins were undermined to an appropriate distance in all directions utilizing iris scissors.
Complex Repair And W Plasty Text: The defect edges were debeveled with a #15 scalpel blade.  The primary defect was closed partially with a complex linear closure.  Given the location of the remaining defect, shape of the defect and the proximity to free margins a W plasty was deemed most appropriate for complete closure of the defect.  Using a sterile surgical marker, an appropriate advancement flap was drawn incorporating the defect and placing the expected incisions within the relaxed skin tension lines where possible.    The area thus outlined was incised deep to adipose tissue with a #15 scalpel blade.  The skin margins were undermined to an appropriate distance in all directions utilizing iris scissors.
O-Z Plasty Text: The defect edges were debeveled with a #15 scalpel blade.  Given the location of the defect, shape of the defect and the proximity to free margins an O-Z plasty (double transposition flap) was deemed most appropriate.  Using a sterile surgical marker, the appropriate transposition flaps were drawn incorporating the defect and placing the expected incisions within the relaxed skin tension lines where possible.    The area thus outlined was incised deep to adipose tissue with a #15 scalpel blade.  The skin margins were undermined to an appropriate distance in all directions utilizing iris scissors.  Hemostasis was achieved with electrocautery.  The flaps were then transposed into place, one clockwise and the other counterclockwise, and anchored with interrupted buried subcutaneous sutures.
Melolabial Interpolation Flap Text: A decision was made to reconstruct the defect utilizing an interpolation axial flap and a staged reconstruction.  A telfa template was made of the defect.  This telfa template was then used to outline the melolabial interpolation flap.  The donor area for the pedicle flap was then injected with anesthesia.  The flap was excised through the skin and subcutaneous tissue down to the layer of the underlying musculature.  The pedicle flap was carefully excised within this deep plane to maintain its blood supply.  The edges of the donor site were undermined.   The donor site was closed in a primary fashion.  The pedicle was then rotated into position and sutured.  Once the tube was sutured into place, adequate blood supply was confirmed with blanching and refill.  The pedicle was then wrapped with xeroform gauze and dressed appropriately with a telfa and gauze bandage to ensure continued blood supply and protect the attached pedicle.
Bi-Rhombic Flap Text: The defect edges were debeveled with a #15 scalpel blade.  Given the location of the defect and the proximity to free margins a bi-rhombic flap was deemed most appropriate.  Using a sterile surgical marker, an appropriate rhombic flap was drawn incorporating the defect. The area thus outlined was incised deep to adipose tissue with a #15 scalpel blade.  The skin margins were undermined to an appropriate distance in all directions utilizing iris scissors.
Complex Repair And Single Advancement Flap Text: The defect edges were debeveled with a #15 scalpel blade.  The primary defect was closed partially with a complex linear closure.  Given the location of the remaining defect, shape of the defect and the proximity to free margins a single advancement flap was deemed most appropriate for complete closure of the defect.  Using a sterile surgical marker, an appropriate advancement flap was drawn incorporating the defect and placing the expected incisions within the relaxed skin tension lines where possible.    The area thus outlined was incised deep to adipose tissue with a #15 scalpel blade.  The skin margins were undermined to an appropriate distance in all directions utilizing iris scissors.
V-Y Flap Text: The defect edges were debeveled with a #15 scalpel blade.  Given the location of the defect, shape of the defect and the proximity to free margins a V-Y flap was deemed most appropriate.  Using a sterile surgical marker, an appropriate advancement flap was drawn incorporating the defect and placing the expected incisions within the relaxed skin tension lines where possible.    The area thus outlined was incised deep to adipose tissue with a #15 scalpel blade.  The skin margins were undermined to an appropriate distance in all directions utilizing iris scissors.
Slit Excision Additional Text (Leave Blank If You Do Not Want): A linear line was drawn on the skin overlying the lesion. An incision was made slowly until the lesion was visualized.  Once visualized, the lesion was removed with blunt dissection.
Complex Repair And Modified Advancement Flap Text: The defect edges were debeveled with a #15 scalpel blade.  The primary defect was closed partially with a complex linear closure.  Given the location of the remaining defect, shape of the defect and the proximity to free margins a modified advancement flap was deemed most appropriate for complete closure of the defect.  Using a sterile surgical marker, an appropriate advancement flap was drawn incorporating the defect and placing the expected incisions within the relaxed skin tension lines where possible.    The area thus outlined was incised deep to adipose tissue with a #15 scalpel blade.  The skin margins were undermined to an appropriate distance in all directions utilizing iris scissors.
Post-Care Instructions: I reviewed with the patient in detail post-care instructions. Patient is not to engage in any heavy lifting, exercise, or swimming for the next 14 days. Should the patient develop any fevers, chills, bleeding, severe pain patient will contact the office immediately.
Complex Repair And Rotation Flap Text: The defect edges were debeveled with a #15 scalpel blade.  The primary defect was closed partially with a complex linear closure.  Given the location of the remaining defect, shape of the defect and the proximity to free margins a rotation flap was deemed most appropriate for complete closure of the defect.  Using a sterile surgical marker, an appropriate advancement flap was drawn incorporating the defect and placing the expected incisions within the relaxed skin tension lines where possible.    The area thus outlined was incised deep to adipose tissue with a #15 scalpel blade.  The skin margins were undermined to an appropriate distance in all directions utilizing iris scissors.
A-T Advancement Flap Text: The defect edges were debeveled with a #15 scalpel blade.  Given the location of the defect, shape of the defect and the proximity to free margins an A-T advancement flap was deemed most appropriate.  Using a sterile surgical marker, an appropriate advancement flap was drawn incorporating the defect and placing the expected incisions within the relaxed skin tension lines where possible.    The area thus outlined was incised deep to adipose tissue with a #15 scalpel blade.  The skin margins were undermined to an appropriate distance in all directions utilizing iris scissors.
Crescentic Advancement Flap Text: The defect edges were debeveled with a #15 scalpel blade.  Given the location of the defect and the proximity to free margins a crescentic advancement flap was deemed most appropriate.  Using a sterile surgical marker, the appropriate advancement flap was drawn incorporating the defect and placing the expected incisions within the relaxed skin tension lines where possible.    The area thus outlined was incised deep to adipose tissue with a #15 scalpel blade.  The skin margins were undermined to an appropriate distance in all directions utilizing iris scissors.
Epidermal Autograft Text: The defect edges were debeveled with a #15 scalpel blade.  Given the location of the defect, shape of the defect and the proximity to free margins an epidermal autograft was deemed most appropriate.  Using a sterile surgical marker, the primary defect shape was transferred to the donor site. The epidermal graft was then harvested.  The skin graft was then placed in the primary defect and oriented appropriately.
Complex Repair And Ftsg Text: The defect edges were debeveled with a #15 scalpel blade.  The primary defect was closed partially with a complex linear closure.  Given the location of the defect, shape of the defect and the proximity to free margins a full thickness skin graft was deemed most appropriate to repair the remaining defect.  The graft was trimmed to fit the size of the remaining defect.  The graft was then placed in the primary defect, oriented appropriately, and sutured into place.
Undermining Location (Optional): in the superficial subcutaneous fat
W Plasty Text: The lesion was extirpated to the level of the fat with a #15 scalpel blade.  Given the location of the defect, shape of the defect and the proximity to free margins a W-plasty was deemed most appropriate for repair.  Using a sterile surgical marker, the appropriate transposition arms of the W-plasty were drawn incorporating the defect and placing the expected incisions within the relaxed skin tension lines where possible.    The area thus outlined was incised deep to adipose tissue with a #15 scalpel blade.  The skin margins were undermined to an appropriate distance in all directions utilizing iris scissors.  The opposing transposition arms were then transposed into place in opposite direction and anchored with interrupted buried subcutaneous sutures.
Intermediate Repair Preamble Text (Leave Blank If You Do Not Want): Undermining was performed with blunt dissection.

## 2017-10-06 ENCOUNTER — ANTICOAGULATION VISIT (OUTPATIENT)
Dept: MEDICAL GROUP | Facility: PHYSICIAN GROUP | Age: 82
End: 2017-10-06
Payer: MEDICARE

## 2017-10-06 VITALS — DIASTOLIC BLOOD PRESSURE: 59 MMHG | SYSTOLIC BLOOD PRESSURE: 122 MMHG | HEART RATE: 76 BPM

## 2017-10-06 DIAGNOSIS — Z95.2 HISTORY OF HEART VALVE REPLACEMENT WITH MECHANICAL VALVE: ICD-10-CM

## 2017-10-06 LAB — INR PPP: 1.1 (ref 2–3.5)

## 2017-10-06 PROCEDURE — 85610 PROTHROMBIN TIME: CPT | Performed by: NURSE PRACTITIONER

## 2017-10-06 NOTE — PROGRESS NOTES
Anticoagulation Summary  As of 10/6/2017    INR goal:   2.5-3.5   TTR:   51.4 % (2.2 y)   Today's INR:   1.1!   Maintenance plan:   2.5 mg (5 mg x 0.5) on Tue; 5 mg (5 mg x 1) all other days   Weekly total:   32.5 mg   Plan last modified:   Loyd Moses PharmD (9/22/2017)   Next INR check:   10/13/2017   Target end date:   Indefinite    Indications    CVA (cerebral vascular accident) (CMS-HCC) [I63.9]  History of heart valve replacement with mechanical valve [V43.3] [Z95.2]             Anticoagulation Episode Summary     INR check location:       Preferred lab:       Send INR reminders to:       Comments:         Anticoagulation Care Providers     Provider Role Specialty Phone number    Caterina HolderD Responsible          Anticoagulation Patient Findings      HPI:  Adrienne Mejia seen in clinic today, on anticoagulation therapy with warfarin for MVR,  Changes to current medical/health status since last appt: she had a procedure, but did NOT use Lovenox as recommended, we will start her on Lovenox today.   Denies signs/symptoms of bleeding and/or thrombosis since the last appt.    Denies any interval changes to diet  Denies any interval changes to medications since last appt.   Denies any complications or cost restrictions with current therapy.   BP recorded in vitals.    A/P   INR  sub-therapeutic.   Increase weekly warfarin dose as noted and Lovenox     Follow up appointment in 1 week(s).     Loyd Moses, [place]

## 2017-10-13 ENCOUNTER — ANTICOAGULATION VISIT (OUTPATIENT)
Dept: MEDICAL GROUP | Facility: PHYSICIAN GROUP | Age: 82
End: 2017-10-13
Payer: MEDICARE

## 2017-10-13 VITALS — DIASTOLIC BLOOD PRESSURE: 89 MMHG | SYSTOLIC BLOOD PRESSURE: 170 MMHG | HEART RATE: 83 BPM

## 2017-10-13 DIAGNOSIS — Z95.2 HISTORY OF HEART VALVE REPLACEMENT WITH MECHANICAL VALVE: ICD-10-CM

## 2017-10-13 LAB — INR PPP: 2.6 (ref 2–3.5)

## 2017-10-13 PROCEDURE — 85610 PROTHROMBIN TIME: CPT | Performed by: NURSE PRACTITIONER

## 2017-10-13 NOTE — PROGRESS NOTES
Anticoagulation Summary  As of 10/13/2017    INR goal:   2.5-3.5   TTR:   51.0 % (2.2 y)   Today's INR:   2.6   Maintenance plan:   5 mg (5 mg x 1) every day   Weekly total:   35 mg   Plan last modified:   Loyd Moses, [place] (10/13/2017)   Next INR check:   12/1/2017   Target end date:   Indefinite    Indications    CVA (cerebral vascular accident) (CMS-HCC) [I63.9]  History of heart valve replacement with mechanical valve [V43.3] [Z95.2]             Anticoagulation Episode Summary     INR check location:       Preferred lab:       Send INR reminders to:       Comments:         Anticoagulation Care Providers     Provider Role Specialty Phone number    Caterina HolderD Responsible          Anticoagulation Patient Findings      HPI:  Adrienne Mejia seen in clinic today, on anticoagulation therapy with warfairn for MVR  Changes to current medical/health status since last appt: she was on Lovenox s/p a procedure, she will be able to stop today   Denies signs/symptoms of bleeding and/or thrombosis since the last appt.    Denies any interval changes to diet  Denies any interval changes to medications since last appt.   Denies any complications or cost restrictions with current therapy.   BP recorded in vitals.    A/P   INR  therapeutic.   Continue weekly warfarin dose as noted      Follow up appointment in 3 week(s).     Loyd Moses, CaterinaD

## 2017-10-18 ENCOUNTER — APPOINTMENT (RX ONLY)
Dept: URBAN - METROPOLITAN AREA CLINIC 31 | Facility: CLINIC | Age: 82
Setting detail: DERMATOLOGY
End: 2017-10-18

## 2017-10-18 DIAGNOSIS — Z48.02 ENCOUNTER FOR REMOVAL OF SUTURES: ICD-10-CM

## 2017-10-18 PROCEDURE — ? SUTURE REMOVAL (GLOBAL PERIOD)

## 2017-10-18 PROCEDURE — ? COUNSELING

## 2017-10-18 PROCEDURE — 99024 POSTOP FOLLOW-UP VISIT: CPT

## 2017-10-18 ASSESSMENT — LOCATION ZONE DERM
LOCATION ZONE: FACE
LOCATION ZONE: FACE

## 2017-10-18 ASSESSMENT — LOCATION DETAILED DESCRIPTION DERM
LOCATION DETAILED: LEFT CENTRAL BUCCAL CHEEK
LOCATION DETAILED: LEFT CENTRAL BUCCAL CHEEK

## 2017-10-18 ASSESSMENT — LOCATION SIMPLE DESCRIPTION DERM
LOCATION SIMPLE: LEFT CHEEK
LOCATION SIMPLE: LEFT CHEEK

## 2017-10-18 NOTE — PROCEDURE: SUTURE REMOVAL (GLOBAL PERIOD)
Detail Level: Detailed
Add 57270 Cpt? (Important Note: In 2017 The Use Of 90283 Is Being Tracked By Cms To Determine Future Global Period Reimbursement For Global Periods): yes

## 2017-11-17 ENCOUNTER — ANTICOAGULATION VISIT (OUTPATIENT)
Dept: MEDICAL GROUP | Facility: PHYSICIAN GROUP | Age: 82
End: 2017-11-17
Payer: MEDICARE

## 2017-11-17 VITALS
DIASTOLIC BLOOD PRESSURE: 75 MMHG | BODY MASS INDEX: 25.61 KG/M2 | SYSTOLIC BLOOD PRESSURE: 147 MMHG | HEART RATE: 59 BPM | WEIGHT: 140 LBS

## 2017-11-17 DIAGNOSIS — Z95.2 HISTORY OF HEART VALVE REPLACEMENT WITH MECHANICAL VALVE: ICD-10-CM

## 2017-11-17 LAB — INR PPP: 3.4 (ref 2–3.5)

## 2017-11-17 PROCEDURE — 99211 OFF/OP EST MAY X REQ PHY/QHP: CPT | Performed by: NURSE PRACTITIONER

## 2017-11-17 PROCEDURE — 85610 PROTHROMBIN TIME: CPT | Performed by: NURSE PRACTITIONER

## 2017-11-17 NOTE — PROGRESS NOTES
OP Anticoagulation Service Note    Date: 11/17/2017  Vitals:    11/17/17 1351   BP: 147/75   Pulse: (!) 59   Weight: 63.5 kg (140 lb)       Anticoagulation Summary  As of 11/17/2017    INR goal:   2.5-3.5   TTR:   53.0 % (2.3 y)   Today's INR:   3.4   Maintenance plan:   5 mg (5 mg x 1) every day   Weekly total:   35 mg   Plan last modified:   Loyd Moses, PharmD (10/13/2017)   Next INR check:   1/5/2018   Target end date:   Indefinite    Indications    CVA (cerebral vascular accident) (CMS-HCC) [I63.9]  History of heart valve replacement with mechanical valve [V43.3] [Z95.2]             Anticoagulation Episode Summary     INR check location:       Preferred lab:       Send INR reminders to:       Comments:         Anticoagulation Care Providers     Provider Role Specialty Phone number    Caterina HolderD Responsible          Anticoagulation Patient Findings      HPI:   Adrienne Mejia seen in clinic today, they are here today for a INR check on anticoagulation therapy with warfarin because they have MVR and CVA    The reason for today's visit is to prevent morbidity and mortality from a  stroke and to reduce the risk of bleeding while on a anticoagulant.     CHADS-VASC = n/a     Stroke risk: high      Confirmed warfarin dosing regimen  Interval Changes with foods rich in vitamin K:No  Interval Changes in ETOH:  No  Interval Changes in smoking status: No  Interval Changes in medication: No   Cost restriction: No    S/S of bleeding or bruising:  No  Signs/symptoms  thrombosis since the last appt: No  Bleed risk is: moderate, due to higher range     3 vitals included with today's appt:Yes  (BP, HR, weight, ht, RR)     A/P   INR  therapeutic.     Intervention required today to prevent:    No change in dose needed today, they will need to continue with the same dose and diet to prevent adverse events while on a anticoagulant.      Continue weekly warfarin dose as noted    Additional education provided  today regarding reducing bleed risk and dietary constraints: Yes    Pt educated to contact our clinic with any changes in medications or s/s of bleeding or thrombosis    Follow up appointment in 7 week(s)    Follow up interval reason: per our collaborative practice policy as their last INR was 2.6 on 10/13/2017    Justify length:   They have a TTR of 53 which is not at target (TTR target/goal is 100%) and requires close follow up to prevent a adverse event (the lower the TTR the higher risk of clots, strokes, or bleeding).     CHEST guidelines recommend frequent INR monitoring at regular intervals (a few days up to a max of 12 weeks) to ensure they are on proper dose of warfarin and not having any complications from therapy and tolerating being on warfarin.  INRs can dramatically change over a short time period due to diet, medications, and medical conditions.

## 2018-01-05 ENCOUNTER — ANTICOAGULATION VISIT (OUTPATIENT)
Dept: MEDICAL GROUP | Facility: PHYSICIAN GROUP | Age: 83
End: 2018-01-05
Payer: MEDICARE

## 2018-01-05 DIAGNOSIS — Z95.2 HISTORY OF HEART VALVE REPLACEMENT WITH MECHANICAL VALVE: ICD-10-CM

## 2018-01-05 LAB — INR PPP: 3.3 (ref 2–3.5)

## 2018-01-05 PROCEDURE — 85610 PROTHROMBIN TIME: CPT | Performed by: NURSE PRACTITIONER

## 2018-01-05 NOTE — PROGRESS NOTES
OP Anticoagulation Service Note    Date: 1/5/2018  There were no vitals filed for this visit.    Anticoagulation Summary  As of 1/5/2018    INR goal:   2.5-3.5   TTR:   55.6 % (2.5 y)   Today's INR:   3.3   Maintenance plan:   5 mg (5 mg x 1) every day   Weekly total:   35 mg   Plan last modified:   Loyd Moses, PharmD (10/13/2017)   Next INR check:   3/9/2018   Target end date:   Indefinite    Indications    CVA (cerebral vascular accident) (CMS-HCC) [I63.9]  History of heart valve replacement with mechanical valve [V43.3] [Z95.2]             Anticoagulation Episode Summary     INR check location:       Preferred lab:       Send INR reminders to:       Comments:         Anticoagulation Care Providers     Provider Role Specialty Phone number    Yuridia Escalante, PharmD Responsible          Anticoagulation Patient Findings      HPI:   Adrienne Mejia seen in clinic today, they are here today for a INR check on anticoagulation therapy with warfarin because they have a PMH of a CVA and MHV    The reason for today's visit is to prevent morbidity and mortality from a  stoke and to reduce the risk of bleeding while on a anticoagulant.     Reason for today's visit (per our collaborative practice policy) is because their last INR was 3.4 on 11/17.  Intervention at the last visit:none needed     Additional education provided today regarding reducing bleed risk and dietary constraints: No    Any upcoming  procedures: none    Confirmed warfarin dosing regimen  Interval Changes with foods rich in vitamin K:No  Interval Changes in ETOH:  No  Interval Changes in smoking status: No  Interval Changes in medication: No   Cost restriction: No    S/S of bleeding or bruising:  No  Signs/symptoms  thrombosis since the last appt: No  Bleed risk is: high,     3 vitals included with today's appt:No  Too many clothes     Assessment:   INR  therapeutic.     Intervention required today to prevent:    No change in dose needed today, they  will need to continue with the same dose and diet to prevent adverse events while on a anticoagulant.     They have a TTR of 55.6 which is not at target (TTR target/goal is 100%) and requires close follow up to prevent a adverse event (the lower the TTR the higher risk of clots, strokes, or bleeding).       Plan:  Continue weekly warfarin dose as noted      Follow up:  Follow up appointment in 7 week(s) via telemed per pt       Other info:  Pt educated to contact our clinic with any changes in medications or s/s of bleeding or thrombosis    CHEST guidelines recommend frequent INR monitoring at regular intervals (a few days up to a max of 12 weeks) to ensure they are on the proper dose of warfarin and not having any complications from therapy.  INRs can dramatically change over a short time period due to diet, medications, and medical conditions.

## 2018-03-02 ENCOUNTER — ANTICOAGULATION VISIT (OUTPATIENT)
Dept: MEDICAL GROUP | Facility: PHYSICIAN GROUP | Age: 83
End: 2018-03-02
Payer: MEDICARE

## 2018-03-02 DIAGNOSIS — I63.00 CEREBROVASCULAR ACCIDENT (CVA) DUE TO THROMBOSIS OF PRECEREBRAL ARTERY (HCC): ICD-10-CM

## 2018-03-02 DIAGNOSIS — Z95.2 HISTORY OF HEART VALVE REPLACEMENT WITH MECHANICAL VALVE: ICD-10-CM

## 2018-03-02 LAB — INR PPP: 2.8 (ref 2–3.5)

## 2018-03-02 PROCEDURE — 85610 PROTHROMBIN TIME: CPT | Performed by: NURSE PRACTITIONER

## 2018-03-02 RX ORDER — WARFARIN SODIUM 5 MG/1
TABLET ORAL
Qty: 90 TAB | Refills: 1 | Status: SHIPPED | OUTPATIENT
Start: 2018-03-02 | End: 2019-03-05 | Stop reason: SDUPTHER

## 2018-03-02 NOTE — PROGRESS NOTES
Anticoagulation Summary  As of 3/2/2018    INR goal:   2.5-3.5   TTR:   58.2 % (2.6 y)   Today's INR:   2.8   Maintenance plan:   5 mg (5 mg x 1) every day   Weekly total:   35 mg   Plan last modified:   Loyd Moses, PharmD (10/13/2017)   Next INR check:   5/11/2018   Target end date:   Indefinite    Indications    CVA (cerebral vascular accident) (CMS-HCC) [I63.9]  History of heart valve replacement with mechanical valve [V43.3] [Z95.2]             Anticoagulation Episode Summary     INR check location:       Preferred lab:       Send INR reminders to:       Comments:         Anticoagulation Care Providers     Provider Role Specialty Phone number    Caterina HolderD Responsible          Anticoagulation Patient Findings  Patient Findings     Negatives:   Signs/symptoms of thrombosis, Signs/symptoms of bleeding, Laboratory test error suspected, Change in health, Change in alcohol use, Change in activity, Upcoming invasive procedure, Emergency department visit, Upcoming dental procedure, Missed doses, Extra doses, Change in medications, Change in diet/appetite, Hospital admission, Bruising, Other complaints        History of Present Illness: follow up appointment for chronic anticoagulation with the high risk medication, warfarin for stroke, mechanical mitral valve.    Pt remains therapeutic today. Pt is to continue with current warfarin dosing regimen.  Will increase the interval to recheck INR.  Medications reviewed.  Follow up in 10 weeks, to reduce risk of adverse events related to this high risk medication,  Warfarin.  Ira Segura, PharmD      Pt declines vitals its snowing & I want to get back to [place]

## 2018-05-10 LAB — INR PPP: 2.3 (ref 2–3.5)

## 2018-05-16 ENCOUNTER — ANTICOAGULATION MONITORING (OUTPATIENT)
Dept: VASCULAR LAB | Facility: MEDICAL CENTER | Age: 83
End: 2018-05-16

## 2018-05-16 DIAGNOSIS — Z95.2 HISTORY OF HEART VALVE REPLACEMENT WITH MECHANICAL VALVE: ICD-10-CM

## 2018-05-16 NOTE — PROGRESS NOTES
Anticoagulation Summary  As of 5/16/2018    INR goal:   2.5-3.5   TTR:   58.3 % (2.8 y)   Today's INR:   2.3! (5/10/2018)   Warfarin maintenance plan:   5 mg (5 mg x 1) every day   Weekly warfarin total:   35 mg   Plan last modified:   Loyd Moses, PharmD (10/13/2017)   Next INR check:   5/23/2018   Target end date:   Indefinite    Indications    CVA (cerebral vascular accident) (HCC) [I63.9]  History of heart valve replacement with mechanical valve [V43.3] [Z95.2]             Anticoagulation Episode Summary     INR check location:       Preferred lab:       Send INR reminders to:       Comments:         Anticoagulation Care Providers     Provider Role Specialty Phone number    Yuridia Escalante, PharmD Responsible          Anticoagulation Patient Findings    HPI:  Adrienne Mejia, on anticoagulation therapy with warfarin for CVA.  Changes to current medical/health status since last appt: none  Denies signs/symptoms of bleeding and/or thrombosis since the last appt.    Denies any interval changes to diet  Denies any interval changes to medications since last appt.   Denies any complications or cost restrictions with current therapy.     A/P   INR  SUB-therapeutic.   Bolus today then Pt is to continue with current warfarin dosing regimen.     Next INR in 1 week(s).    Robi Bolaños, PharmD

## 2018-06-06 ENCOUNTER — TELEPHONE (OUTPATIENT)
Dept: VASCULAR LAB | Facility: MEDICAL CENTER | Age: 83
End: 2018-06-06

## 2018-06-06 NOTE — TELEPHONE ENCOUNTER
[facility]    Pt states she will obtain INR at earliest convenience.    Robi Bolaños, PharmD

## 2018-06-08 ENCOUNTER — ANTICOAGULATION VISIT (OUTPATIENT)
Dept: MEDICAL GROUP | Facility: PHYSICIAN GROUP | Age: 83
End: 2018-06-08
Payer: MEDICARE

## 2018-06-08 DIAGNOSIS — Z79.01 CHRONIC ANTICOAGULATION: Primary | ICD-10-CM

## 2018-06-08 DIAGNOSIS — Z79.01 CHRONIC ANTICOAGULATION: ICD-10-CM

## 2018-06-08 DIAGNOSIS — Z95.2 HISTORY OF HEART VALVE REPLACEMENT WITH MECHANICAL VALVE: ICD-10-CM

## 2018-06-08 LAB — INR PPP: 2.5 (ref 2–3.5)

## 2018-06-08 PROCEDURE — 99999 PR NO CHARGE: CPT | Performed by: NURSE PRACTITIONER

## 2018-06-08 PROCEDURE — 85610 PROTHROMBIN TIME: CPT | Performed by: NURSE PRACTITIONER

## 2018-06-08 NOTE — PROGRESS NOTES
OP Anticoagulation Service Note    Date: 6/8/2018  There were no vitals filed for this visit.    Anticoagulation Summary  As of 6/8/2018    INR goal:   2.5-3.5   TTR:   56.7 % (2.9 y)   Today's INR:   2.5   Warfarin maintenance plan:   5 mg (5 mg x 1) every day   Weekly warfarin total:   35 mg   Plan last modified:   Loyd Moses, PharmD (10/13/2017)   Next INR check:   7/24/2018   Target end date:   Indefinite    Indications    CVA (cerebral vascular accident) (HCC) [I63.9]  History of heart valve replacement with mechanical valve [V43.3] [Z95.2]             Anticoagulation Episode Summary     INR check location:       Preferred lab:       Send INR reminders to:       Comments:         Anticoagulation Care Providers     Provider Role Specialty Phone number    Yuridia Escalante, PharmD Responsible          Anticoagulation Patient Findings      HPI:   Adrienne Mejia seen in clinic today, they are here today for a INR check on anticoagulation therapy with warfarin because they have a PMH of a CVA and a MVR    The reason for today's visit is to prevent morbidity and mortality from a stroke  and to reduce the risk of bleeding while on a anticoagulant.     Reason for today's visit (per our collaborative practice policy) is because their last INR was 2.3  on  5/10.   Intervention at the last visit:  Increased the dose    Additional education provided today regarding reducing bleed risk and dietary constraints:  About diet    Any upcoming procedures:   none    Confirmed warfarin dosing regimen  Interval Changes with foods rich in vitamin K: No  Interval Changes in ETOH:   No  Interval Changes in smoking status:  No  Interval Changes in medication:  No   Cost restriction:  No    S/S of bleeding or bruising:  Minor fall and bruising on the arms.   Signs/symptoms  thrombosis since the last appt:  No  Bleed risk is:  moderate,     3 vitals included with today's appt :No  (BP, HR, weight, ht, RR)     Assessment:   INR   therapeutic.     No change in dose needed today, they will need to continue with the same dose and diet to prevent adverse events while on a anticoagulant.     They have a TTR of 56.7  which is not at target (TTR target/goal is 100%) and requires close follow up to prevent a adverse event (the lower the TTR the higher risk of clots, strokes, or bleeding).       Plan:  Continue weekly warfarin dose as noted      Follow up:  Follow up appointment in 6 week(s)       Other info:  Pt educated to contact our clinic with any changes in medications or s/s of bleeding or thrombosis    CHEST guidelines recommend frequent INR monitoring at regular intervals (a few days up to a max of 12 weeks) to ensure they are on the proper dose of warfarin and not having any complications from therapy.  INRs can dramatically change over a short time period due to diet, medications, and medical conditions.

## 2018-07-24 ENCOUNTER — APPOINTMENT (OUTPATIENT)
Dept: VASCULAR LAB | Facility: MEDICAL CENTER | Age: 83
End: 2018-07-24
Payer: MEDICARE

## 2018-08-08 ENCOUNTER — TELEPHONE (OUTPATIENT)
Dept: VASCULAR LAB | Facility: MEDICAL CENTER | Age: 83
End: 2018-08-08

## 2018-08-08 NOTE — TELEPHONE ENCOUNTER
Spoke with pt regarding overdue INR.  Pt booked appt at [facility] for 8/24/18 at 130p.     Yuridia Escalante, CaterinaD

## 2018-08-10 ENCOUNTER — PATIENT OUTREACH (OUTPATIENT)
Dept: HEALTH INFORMATION MANAGEMENT | Facility: OTHER | Age: 83
End: 2018-08-10

## 2018-08-10 NOTE — PROGRESS NOTES
Outcome: Left Message    Please transfer to Patient Outreach Team at [phone] when patient returns call.    WebIZ Checked & Epic Updated:  yes    [facility] Verified: yes    Attempt # 1

## 2018-08-17 ENCOUNTER — ANTICOAGULATION VISIT (OUTPATIENT)
Dept: MEDICAL GROUP | Facility: PHYSICIAN GROUP | Age: 83
End: 2018-08-17
Payer: MEDICARE

## 2018-08-17 DIAGNOSIS — Z95.2 HISTORY OF HEART VALVE REPLACEMENT WITH MECHANICAL VALVE: ICD-10-CM

## 2018-08-17 DIAGNOSIS — Z79.01 CHRONIC ANTICOAGULATION: Primary | ICD-10-CM

## 2018-08-17 LAB — INR PPP: 1.8 (ref 2–3.5)

## 2018-08-17 PROCEDURE — 99211 OFF/OP EST MAY X REQ PHY/QHP: CPT | Performed by: NURSE PRACTITIONER

## 2018-08-17 PROCEDURE — 85610 PROTHROMBIN TIME: CPT | Performed by: NURSE PRACTITIONER

## 2018-08-17 NOTE — PROGRESS NOTES
OP Anticoagulation Service Note    Date: 8/17/2018  There were no vitals filed for this visit.    Anticoagulation Summary  As of 8/17/2018    INR goal:   2.5-3.5   TTR:   53.2 % (3.1 y)   Today's INR:   1.8!   Warfarin maintenance plan:   5 mg (5 mg x 1) every day   Weekly warfarin total:   35 mg   Plan last modified:   Loyd Moses, PharmD (10/13/2017)   Next INR check:   8/28/2018   Target end date:   Indefinite    Indications    CVA (cerebral vascular accident) (HCC) [I63.9]  History of heart valve replacement with mechanical valve [V43.3] [Z95.2]             Anticoagulation Episode Summary     INR check location:       Preferred lab:       Send INR reminders to:       Comments:         Anticoagulation Care Providers     Provider Role Specialty Phone number    Yuridia Escalante, PharmD Responsible          Anticoagulation Patient Findings      HPI:   Adrienne Mejia seen in clinic today, they are here today for a INR check on anticoagulation therapy with warfarin because they have a PMH of a CVA and a MVR    The reason for today's visit is to prevent morbidity and mortality from a stroke  and to reduce the risk of bleeding while on a anticoagulant.     Additional education provided today regarding reducing bleed risk and dietary constraints:  About how vitamin K and foods work with warfarin and the bleeding risk on a anticoagulant     Any upcoming procedures:   none    Confirmed warfarin dosing regimen  Interval Changes with foods rich in vitamin K: No  Interval Changes in ETOH:   No  Interval Changes in smoking status:  No  Interval Changes in medication:  No   Cost restriction:  No    S/S of bleeding or bruising:  No  Signs/symptoms  thrombosis since the last appt:  No  Bleed risk is:  moderate,     3 vitals included with today's appt :  (BP, HR, weight, ht, RR)     Assessment:   INR  sub-therapeutic.      a change is needed today because INR is out of  range.      They have a TTR of 53.2  which is not at  target (TTR target/goal is 100%) and requires close follow up to prevent a adverse event (the lower the TTR the higher risk of clots, strokes, or bleeding).       Plan:  7.5mg today then continue weekly warfarin dose as noted      Follow up:  Follow up appointment in 2 week(s)       Other info:  Pt educated to contact our clinic with any changes in medications or s/s of bleeding or thrombosis    CHEST guidelines recommend frequent INR monitoring at regular intervals (a few days up to a max of 12 weeks) to ensure they are on the proper dose of warfarin and not having any complications from therapy.  INRs can dramatically change over a short time period due to diet, medications, and medical conditions.

## 2018-08-28 ENCOUNTER — NON-PROVIDER VISIT (OUTPATIENT)
Dept: MEDICAL GROUP | Facility: PHYSICIAN GROUP | Age: 83
End: 2018-08-28
Payer: MEDICARE

## 2018-08-28 ENCOUNTER — ANTICOAGULATION MONITORING (OUTPATIENT)
Dept: VASCULAR LAB | Facility: MEDICAL CENTER | Age: 83
End: 2018-08-28
Payer: MEDICARE

## 2018-08-28 ENCOUNTER — APPOINTMENT (OUTPATIENT)
Dept: VASCULAR LAB | Facility: MEDICAL CENTER | Age: 83
End: 2018-08-28
Payer: MEDICARE

## 2018-08-28 VITALS
SYSTOLIC BLOOD PRESSURE: 140 MMHG | OXYGEN SATURATION: 97 % | DIASTOLIC BLOOD PRESSURE: 90 MMHG | RESPIRATION RATE: 16 BRPM | HEART RATE: 60 BPM

## 2018-08-28 DIAGNOSIS — I63.239 CEREBROVASCULAR ACCIDENT (CVA) DUE TO OCCLUSION OF CAROTID ARTERY, UNSPECIFIED BLOOD VESSEL LATERALITY (HCC): ICD-10-CM

## 2018-08-28 DIAGNOSIS — Z95.2 HISTORY OF HEART VALVE REPLACEMENT WITH MECHANICAL VALVE: ICD-10-CM

## 2018-08-28 DIAGNOSIS — Z79.01 CHRONIC ANTICOAGULATION: Chronic | ICD-10-CM

## 2018-08-28 DIAGNOSIS — D68.9 COAGULATION DISORDER (HCC): ICD-10-CM

## 2018-08-28 DIAGNOSIS — I65.23 BILATERAL CAROTID ARTERY STENOSIS: ICD-10-CM

## 2018-08-28 LAB
INR BLD: 2.3 (ref 0.9–1.2)
INR BLD: 2.3 (ref 0.9–1.2)
INR PPP: 2.3 (ref 2–3.5)
POC PROTIME: ABNORMAL
POC PROTIME: ABNORMAL

## 2018-08-28 PROCEDURE — 85610 PROTHROMBIN TIME: CPT | Performed by: NURSE PRACTITIONER

## 2018-08-28 PROCEDURE — 99212 OFFICE O/P EST SF 10 MIN: CPT | Performed by: NURSE PRACTITIONER

## 2018-08-28 NOTE — PROGRESS NOTES
OP Anticoagulation Service Note    Date: 8/28/2018  Blood Pressure : 140/90  Pulse: 60  Respiration: 16    Anticoagulation Summary  As of 8/28/2018    INR goal:   2.5-3.5   TTR:   52.7 % (3.1 y)   Today's INR:   2.3!   Warfarin maintenance plan:   5 mg (5 mg x 1) every day   Weekly warfarin total:   35 mg   Plan last modified:   Caterina CaputoD (10/13/2017)   Next INR check:   9/25/2018   Target end date:   Indefinite    Indications    CVA (cerebral vascular accident) (HCC) [I63.9]  History of heart valve replacement with mechanical valve [V43.3] [Z95.2]             Anticoagulation Episode Summary     INR check location:       Preferred lab:       Send INR reminders to:       Comments:         Anticoagulation Care Providers     Provider Role Specialty Phone number    Yuridia Escalante, PharmD Responsible          Anticoagulation Patient Findings      THIS VISIT CONDUCTED WITH [employer] UTILIZING SECURE AND ENCRYPTED VIDEOCONFERENCING EQUIPMENT  ROS:    Pulm: Denies SOB, chest pain.    Card: Denies syncope, edema, palpitations.    Extremities: Denies redness, pain.    PE:    Pulm: No SOB, even and unlabored.    Card: Normal rate and rhythm.   Extremities: No redness, or edema.     INR  is-therapeutic.    Denies signs/symptoms of bleeding and/or thrombosis.    Denies changes to diet or medications.   Follow up appt in 4 weeks     Plan:  Continue current medication regimen.     Medication: Warfarin (Coumadin)     Sunday Monday Tuesday Wednesday Thursday Friday Saturday      5 mg    5 mg    5 mg    5 mg    5 mg    5 mg    5 mg      1 tab(s)    1 tab(s)    1 tab(s)    1 tab(s)    1 tab(s)    1 tab(s)  1 tab(s)     Next Appointment: Tuesday, Sept 25 @3:45     Review all of your home medications and newly ordered medications with your doctor and / or pharmacist. Follow medication instructions as directed by your doctor and / or pharmacist. Please keep your medication list with you and  share with your physician. Update the information when medications are discontinued, doses are changed, or new medications (including over-the-counter products) are added; and carry medication information at all times in the event of emergency situations.      For questions, please contact Outpatient Anticoagulation Service [phone].        Patient is on a high risk medication and therefore requires close monitoring and follow up.     CHEST guidelines recommend frequent INR monitoring at regular intervals (a few days up to a max of 12 weeks) to ensure they are on the proper dose of warfarin and not having any complications from therapy.  INRs can dramatically change over a short time period due to diet, medications, and medical conditions.   The patient instructed to go to the ER for falls with a head injury,  blood in urine or stool or any bleeding that last longer than 20 min.     MARY Gaviria.

## 2018-09-25 ENCOUNTER — NON-PROVIDER VISIT (OUTPATIENT)
Dept: MEDICAL GROUP | Facility: PHYSICIAN GROUP | Age: 83
End: 2018-09-25
Payer: MEDICARE

## 2018-09-25 ENCOUNTER — APPOINTMENT (OUTPATIENT)
Dept: VASCULAR LAB | Facility: MEDICAL CENTER | Age: 83
End: 2018-09-25
Payer: MEDICARE

## 2018-09-25 ENCOUNTER — ANTICOAGULATION MONITORING (OUTPATIENT)
Dept: VASCULAR LAB | Facility: MEDICAL CENTER | Age: 83
End: 2018-09-25
Payer: MEDICARE

## 2018-09-25 VITALS
DIASTOLIC BLOOD PRESSURE: 62 MMHG | SYSTOLIC BLOOD PRESSURE: 108 MMHG | RESPIRATION RATE: 16 BRPM | HEART RATE: 57 BPM | OXYGEN SATURATION: 99 %

## 2018-09-25 DIAGNOSIS — D68.9 COAGULATION DISORDER (HCC): ICD-10-CM

## 2018-09-25 DIAGNOSIS — Z95.2 HISTORY OF HEART VALVE REPLACEMENT WITH MECHANICAL VALVE: ICD-10-CM

## 2018-09-25 DIAGNOSIS — I63.49 CEREBROVASCULAR ACCIDENT (CVA) DUE TO EMBOLISM OF OTHER CEREBRAL ARTERY (HCC): ICD-10-CM

## 2018-09-25 LAB
INR BLD: 2.3 (ref 0.9–1.2)
INR PPP: 2.3 (ref 2–3.5)
POC PROTIME: ABNORMAL

## 2018-09-25 PROCEDURE — 85610 PROTHROMBIN TIME: CPT | Performed by: NURSE PRACTITIONER

## 2018-09-25 PROCEDURE — 99212 OFFICE O/P EST SF 10 MIN: CPT | Performed by: NURSE PRACTITIONER

## 2018-09-25 NOTE — PROGRESS NOTES
OP Anticoagulation Service Note    Date: 9/25/2018  Blood Pressure : 108/62  Pulse: (!) 57  Respiration: 16    Anticoagulation Summary  As of 9/25/2018    INR goal:   2.5-3.5   TTR:   51.4 % (3.2 y)   Today's INR:   2.3!   Warfarin maintenance plan:   5 mg (5 mg x 1) every day   Weekly warfarin total:   35 mg   Plan last modified:   Loyd Moses, PharmD (10/13/2017)   Next INR check:   10/23/2018   Target end date:   Indefinite    Indications    CVA (cerebral vascular accident) (HCC) [I63.9]  History of heart valve replacement with mechanical valve [V43.3] [Z95.2]             Anticoagulation Episode Summary     INR check location:       Preferred lab:       Send INR reminders to:       Comments:         Anticoagulation Care Providers     Provider Role Specialty Phone number    Yuridia KIRSTEN Escalante, PharmD Responsible          Anticoagulation Patient Findings      THIS VISIT CONDUCTED WITH [employer] UTILIZING SECURE AND ENCRYPTED VIDEOCONFERENCING EQUIPMENT  ROS:    Pulm: Denies SOB, chest pain.    Card: Denies syncope, edema, palpitations.    Extremities: Denies redness, pain.    PE:    Pulm: No SOB, even and unlabored.    Card: Normal rate and rhythm.   Extremities: No redness, or edema.     INR  is-therapeutic.    Denies signs/symptoms of bleeding and/or thrombosis.    Denies changes to diet or medications.   Follow up appt in 4 weeks     Plan:  5 mg daily    Medication: Warfarin (Coumadin)     Sunday Monday Tuesday Wednesday Thursday Friday Saturday      5 mg    5 mg    5 mg    5 mg    5 mg    5 mg    5 mg      1 tab(s)    1 tab(s)    1 tab(s)    1 tab(s)    1 tab(s)    1 tab(s)  1 tab(s)     Next Appointment: Tuesday, Oct 23 @ 2:45    Review all of your home medications and newly ordered medications with your doctor and / or pharmacist. Follow medication instructions as directed by your doctor and / or pharmacist. Please keep your medication list with you and share with your  physician. Update the information when medications are discontinued, doses are changed, or new medications (including over-the-counter products) are added; and carry medication information at all times in the event of emergency situations.      For questions, please contact Outpatient Anticoagulation Service [phone].    Patient is on a high risk medication and therefore requires close monitoring and follow up.     CHEST guidelines recommend frequent INR monitoring at regular intervals (a few days up to a max of 12 weeks) to ensure they are on the proper dose of warfarin and not having any complications from therapy.  INRs can dramatically change over a short time period due to diet, medications, and medical conditions.   The patient instructed to go to the ER for falls with a head injury,  blood in urine or stool or any bleeding that last longer than 20 min.     MARY Gaviria.

## 2018-10-12 ENCOUNTER — HOSPITAL ENCOUNTER (OUTPATIENT)
Dept: LAB | Facility: MEDICAL CENTER | Age: 83
End: 2018-10-12
Attending: PSYCHIATRY & NEUROLOGY
Payer: MEDICARE

## 2018-10-12 ENCOUNTER — ANTICOAGULATION VISIT (OUTPATIENT)
Dept: MEDICAL GROUP | Facility: PHYSICIAN GROUP | Age: 83
End: 2018-10-12
Payer: MEDICARE

## 2018-10-12 DIAGNOSIS — Z95.2 HISTORY OF HEART VALVE REPLACEMENT WITH MECHANICAL VALVE: ICD-10-CM

## 2018-10-12 LAB
CRP SERPL HS-MCNC: 0.29 MG/DL (ref 0–0.75)
FOLATE SERPL-MCNC: >24 NG/ML
INR PPP: 1.9 (ref 2–3.5)
THYROPEROXIDASE AB SERPL-ACNC: <0.2 IU/ML (ref 0–9)
TSH SERPL DL<=0.005 MIU/L-ACNC: 2.49 UIU/ML (ref 0.38–5.33)
VIT B12 SERPL-MCNC: 680 PG/ML (ref 211–911)

## 2018-10-12 PROCEDURE — 86334 IMMUNOFIX E-PHORESIS SERUM: CPT

## 2018-10-12 PROCEDURE — 84160 ASSAY OF PROTEIN ANY SOURCE: CPT

## 2018-10-12 PROCEDURE — 99211 OFF/OP EST MAY X REQ PHY/QHP: CPT | Performed by: FAMILY MEDICINE

## 2018-10-12 PROCEDURE — 86376 MICROSOMAL ANTIBODY EACH: CPT

## 2018-10-12 PROCEDURE — 86235 NUCLEAR ANTIGEN ANTIBODY: CPT | Mod: 91

## 2018-10-12 PROCEDURE — 36415 COLL VENOUS BLD VENIPUNCTURE: CPT

## 2018-10-12 PROCEDURE — 86140 C-REACTIVE PROTEIN: CPT

## 2018-10-12 PROCEDURE — 85652 RBC SED RATE AUTOMATED: CPT

## 2018-10-12 PROCEDURE — 82784 ASSAY IGA/IGD/IGG/IGM EACH: CPT

## 2018-10-12 PROCEDURE — 82607 VITAMIN B-12: CPT

## 2018-10-12 PROCEDURE — 82746 ASSAY OF FOLIC ACID SERUM: CPT

## 2018-10-12 PROCEDURE — 84443 ASSAY THYROID STIM HORMONE: CPT

## 2018-10-12 PROCEDURE — 84165 PROTEIN E-PHORESIS SERUM: CPT

## 2018-10-12 PROCEDURE — 82595 ASSAY OF CRYOGLOBULIN: CPT

## 2018-10-12 PROCEDURE — 85610 PROTHROMBIN TIME: CPT | Performed by: FAMILY MEDICINE

## 2018-10-12 PROCEDURE — 86038 ANTINUCLEAR ANTIBODIES: CPT

## 2018-10-12 PROCEDURE — 86800 THYROGLOBULIN ANTIBODY: CPT

## 2018-10-12 NOTE — PROGRESS NOTES
OP Anticoagulation Service Note    Date: 10/12/2018  There were no vitals filed for this visit.    Anticoagulation Summary  As of 10/12/2018    INR goal:   2.5-3.5   TTR:   51.4 % (3.2 y)   Today's INR:      Warfarin maintenance plan:   5 mg (5 mg x 1) every day   Weekly warfarin total:   35 mg   Plan last modified:   Loyd Moses, PharmD (10/13/2017)   Next INR check:      Target end date:   Indefinite    Indications    CVA (cerebral vascular accident) (HCC) [I63.9]  History of heart valve replacement with mechanical valve [V43.3] [Z95.2]             Anticoagulation Episode Summary     INR check location:       Preferred lab:       Send INR reminders to:       Comments:         Anticoagulation Care Providers     Provider Role Specialty Phone number    Yuridia Escalante, PharmD Responsible          Anticoagulation Patient Findings      HPI:   Adrienne Mejia seen in clinic today, they are here today for a INR check on anticoagulation therapy with warfarin because they have a CVA and a mechanical heart valve replacement    The reason for today's visit is to prevent morbidity and mortality from a stoke  and to reduce the risk of bleeding while on a anticoagulant.     Additional education provided today regarding reducing bleed risk and dietary constraints:  About how vitamin K and foods work with warfarin and the bleeding risk on a anticoagulant     Any upcoming procedures:   none    Confirmed warfarin dosing regimen  Interval Changes with foods rich in vitamin K: No  Interval Changes in ETOH:   No  Interval Changes in smoking status:  No  Interval Changes in medication:  No   Cost restriction:  No    S/S of bleeding or bruising:  No  Signs/symptoms  thrombosis since the last appt:  No  Bleed risk is:  moderate,     3 vitals included with today's appt :  (BP, HR, weight, ht, RR)     Assessment:   INR  sub--therapeutic.       A change is needed today because INR is out of range.  She has some memory problems, so I  will have close follow-up in tele-med clinic next week.    They have a TTR of 50.6 which is not at target (TTR target/goal is 100%) and requires close follow up to prevent a adverse event (the lower the TTR the higher risk of clots, strokes, or bleeding).       Plan:  7.5 mg today then resume her weekly dose of 5 mg daily.    Follow up:  Follow up appointment in 1 week(s)       Other info:  Pt educated to contact our clinic with any changes in medications or s/s of bleeding or thrombosis    CHEST guidelines recommend frequent INR monitoring at regular intervals (a few days up to a max of 12 weeks) to ensure they are on the proper dose of warfarin and not having any complications from therapy.  INRs can dramatically change over a short time period due to diet, medications, and medical conditions.

## 2018-10-13 LAB — ERYTHROCYTE [SEDIMENTATION RATE] IN BLOOD BY WESTERGREN METHOD: 19 MM/HOUR (ref 0–30)

## 2018-10-15 LAB
ENA SS-B IGG SER IA-ACNC: 37 AU/ML (ref 0–40)
NUCLEAR IGG SER QL IA: NORMAL
SSA52 R0ENA AB IGG Q0420: 54 AU/ML (ref 0–40)
SSA60 R0ENA AB IGG Q0419: 15 AU/ML (ref 0–40)
THYROGLOB AB SERPL-ACNC: <0.9 IU/ML (ref 0–4)

## 2018-10-16 ENCOUNTER — APPOINTMENT (OUTPATIENT)
Dept: VASCULAR LAB | Facility: MEDICAL CENTER | Age: 83
End: 2018-10-16
Payer: MEDICARE

## 2018-10-16 LAB
ALBUMIN SERPL-MCNC: 4.17 G/DL (ref 3.75–5.01)
ALPHA1 GLOB SERPL ELPH-MCNC: 0.28 G/DL (ref 0.19–0.46)
ALPHA2 GLOB SERPL ELPH-MCNC: 0.76 G/DL (ref 0.48–1.05)
B-GLOBULIN SERPL ELPH-MCNC: 0.85 G/DL (ref 0.48–1.1)
GAMMA GLOB SERPL ELPH-MCNC: 0.95 G/DL (ref 0.62–1.51)
IGA SERPL-MCNC: 299 MG/DL (ref 68–408)
IGG SERPL-MCNC: 831 MG/DL (ref 768–1632)
IGM SERPL-MCNC: 90 MG/DL (ref 35–263)
INTERPRETATION SERPL IFE-IMP: NORMAL
INTERPRETATION SERPL IFE-IMP: NORMAL
PATHOLOGY STUDY: NORMAL
PROT SERPL-MCNC: 7 G/DL (ref 6–8.3)

## 2018-10-17 LAB — CRYOGLOB SER QL 3D COLD INC: NORMAL

## 2018-11-12 ENCOUNTER — OFFICE VISIT (OUTPATIENT)
Dept: URGENT CARE | Facility: PHYSICIAN GROUP | Age: 83
End: 2018-11-12
Payer: MEDICARE

## 2018-11-12 VITALS
SYSTOLIC BLOOD PRESSURE: 148 MMHG | OXYGEN SATURATION: 98 % | TEMPERATURE: 97 F | RESPIRATION RATE: 16 BRPM | HEART RATE: 56 BPM | HEIGHT: 62 IN | DIASTOLIC BLOOD PRESSURE: 64 MMHG | WEIGHT: 143 LBS | BODY MASS INDEX: 26.31 KG/M2

## 2018-11-12 DIAGNOSIS — R07.81 RIB PAIN ON LEFT SIDE: ICD-10-CM

## 2018-11-12 PROCEDURE — 99214 OFFICE O/P EST MOD 30 MIN: CPT | Performed by: PHYSICIAN ASSISTANT

## 2018-11-12 NOTE — PROGRESS NOTES
Chief Complaint   Patient presents with   • Rib Pain       HISTORY OF PRESENT ILLNESS: Patient is a 83 y.o. female who presents today because she fell the other day and has had left rib pain for the last 2 days.  Denies any difficulty breathing or shortness of breath, no coughing.  She has not been taking any medications for her symptoms.    Patient Active Problem List    Diagnosis Date Noted   • Chronic anticoagulation 04/27/2012     Priority: High   • S/P AVR 04/16/2012     Priority: High   • Hypothyroid 10/04/2013     Priority: Medium     Class: Chronic   • CRF (chronic renal failure) 06/21/2013     Priority: Medium     Class: Chronic   • Hyperlipidemia 11/15/2012     Priority: Medium   • Benign essential HTN 04/27/2012     Priority: Medium   • CVA (cerebral vascular accident) ([facility])      Priority: Low   • Carotid artery stenosis 11/15/2012     Priority: Low   • DM (diabetes mellitus) ([facility]) 04/27/2012     Priority: Low   • History of mitral valve replacement with mechanical valve [V43.3] 07/09/2015   • History of heart valve replacement with mechanical valve [V43.3] 07/09/2015   • GI bleed 12/03/2014   • Lower urinary tract infectious disease 12/03/2014   • 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    Current Outpatient Prescriptions Ordered in [facility]   Medication Sig Dispense Refill   • Diclofenac Sodium (VOLTAREN) 1 % Gel Apply 2 g to skin as directed 4 times a day. 100 g 0   • sertraline (ZOLOFT) 50 MG Tab Take 50 mg by mouth every day.     • warfarin (COUMADIN) 5 MG Tab Take one tablet daily as directed by [facility] 90 Tab 1   • aspirin (ASA) 81 MG Chew Tab chewable tablet Take 81 mg by mouth every day.      • omeprazole (PRILOSEC) 20 MG delayed-release capsule Take 1 Cap by mouth every day. 180 Cap 3   • pravastatin (PRAVACHOL) 40 MG tablet Take 1 Tab by mouth every day. 180 Tab 3   • insulin glargine (LANTUS SOLOSTAR) 100 UNIT/ML SOLN Inject 16 Units as instructed every day. 20 mL 3   • loratadine (CLARITIN) 10 MG TABS Take 1 Tab by mouth every day. 30 Tab 3   • glimepiride (AMARYL) 4 MG TABS Take 4 mg by mouth every morning.     • levothyroxine (SYNTHROID) 88 MCG TABS Take 1 Tab by mouth every day. 90 Tab 1   • vitamin D (CHOLECALCIFEROL) 1000 UNIT TABS Take 1,000 Units by mouth 2 Times a Day.     • acetaminophen (TYLENOL 8 HOUR) 650 MG CR tablet Take 650 mg by mouth every four hours as needed for Mild Pain.       No current Epic-ordered facility-administered medications on file.        Past Medical History:   Diagnosis Date   • Anxious depression 2014   • Benign essential HTN 2012   • Cancer (HCC)    • Chronic anticoagulation 2012   • CVA (cerebral vascular accident) ([facility])    • DM (diabetes mellitus) ([facility]) 2012   • Fecal urgency 3/7/2013   • GERD (gastroesophageal reflux disease)    • GI bleed 12/3/2014   • Hx of echocardiogram 2012   • Hyperlipidemia    • Hypertension    • Melanoma (HCC)     RT LEG   • S/P cardiac catheterization 2012   • Stroke ([facility])    • Urgency of urination 3/7/2013   • UTI (lower urinary tract infection) 12/3/2014       Social History   Substance Use Topics   • Smoking status: Never Smoker   • Smokeless tobacco: Never Used   • Alcohol use Yes      Comment: occasionally       Family Status   Relation Status   • Fa      Family History   Problem Relation Age of Onset   • Heart Disease Father         AORTIC STENOSIS   • Heart Failure Father        ROS:  Review of Systems   Constitutional: Negative for fever, chills, weight loss and malaise/fatigue.   HENT: Negative for ear pain, nosebleeds, congestion, sore throat and neck pain.    Eyes: Negative for blurred  "vision.   Respiratory: Negative for cough, sputum production, shortness of breath and wheezing.    Cardiovascular: Negative for chest pain, palpitations, orthopnea and leg swelling.   Gastrointestinal: Negative for heartburn, nausea, vomiting and abdominal pain.   Genitourinary: Negative for dysuria, urgency and frequency.     Exam:  Blood pressure 148/64, pulse (!) 56, temperature 36.1 °C (97 °F), temperature source Temporal, resp. rate 16, height 1.575 m (5' 2\"), weight 64.9 kg (143 lb), SpO2 98 %.  General:  Well nourished, well developed female in NAD  Head:Normocephalic, atraumatic  Eyes: PERRLA, EOM within normal limits, no conjunctival injection, no scleral icterus, visual fields and acuity grossly intact.  Extremities: no clubbing, cyanosis, or edema.  Muscular skeletal: Left rib cage is without any visual deformity, area ecchymosis.  She does have tenderness to palpation of the lower ribs in the anterolateral aspect.    Please note that this dictation was created using voice recognition software. I have made every reasonable attempt to correct obvious errors, but I expect that there are errors of grammar and possibly content that I did not discover before finalizing the note.    Assessment/Plan:  1. Rib pain on left side  [id number] (WITH 1-VIEW CXR) LEFT    Diclofenac Sodium (VOLTAREN) 1 % Gel   Pending x-ray, apply ice to the area.    Followup with primary care in the next 7-10 days if not significantly improving, return to the urgent care or go to the emergency room sooner for any worsening of symptoms.       "

## 2018-11-13 ENCOUNTER — TELEPHONE (OUTPATIENT)
Dept: VASCULAR LAB | Facility: MEDICAL CENTER | Age: 83
End: 2018-11-13

## 2018-11-14 NOTE — TELEPHONE ENCOUNTER
[facility]    Left a voicemail to remind pt to obtain next INR.    Robi Bolaños, PharmD

## 2018-12-04 ENCOUNTER — NON-PROVIDER VISIT (OUTPATIENT)
Dept: MEDICAL GROUP | Facility: PHYSICIAN GROUP | Age: 83
End: 2018-12-04
Payer: MEDICARE

## 2018-12-04 ENCOUNTER — APPOINTMENT (OUTPATIENT)
Dept: VASCULAR LAB | Facility: MEDICAL CENTER | Age: 83
End: 2018-12-04
Payer: MEDICARE

## 2018-12-04 ENCOUNTER — ANTICOAGULATION MONITORING (OUTPATIENT)
Dept: VASCULAR LAB | Facility: MEDICAL CENTER | Age: 83
End: 2018-12-04
Payer: MEDICARE

## 2018-12-04 VITALS
SYSTOLIC BLOOD PRESSURE: 130 MMHG | RESPIRATION RATE: 16 BRPM | HEART RATE: 78 BPM | OXYGEN SATURATION: 96 % | DIASTOLIC BLOOD PRESSURE: 80 MMHG

## 2018-12-04 DIAGNOSIS — I63.019 CEREBROVASCULAR ACCIDENT (CVA) DUE TO THROMBOSIS OF VERTEBRAL ARTERY, UNSPECIFIED BLOOD VESSEL LATERALITY (HCC): ICD-10-CM

## 2018-12-04 DIAGNOSIS — Z95.2 HISTORY OF HEART VALVE REPLACEMENT WITH MECHANICAL VALVE: ICD-10-CM

## 2018-12-04 DIAGNOSIS — D68.9 COAGULATION DISORDER (HCC): ICD-10-CM

## 2018-12-04 LAB
INR BLD: 1.8 (ref 0.9–1.2)
INR PPP: 1.8 (ref 2–3.5)
POC PROTIME: ABNORMAL

## 2018-12-04 PROCEDURE — 99212 OFFICE O/P EST SF 10 MIN: CPT | Performed by: NURSE PRACTITIONER

## 2018-12-04 PROCEDURE — 85610 PROTHROMBIN TIME: CPT | Performed by: NURSE PRACTITIONER

## 2018-12-04 NOTE — PROGRESS NOTES
OP Anticoagulation Service Note    Date: 12/4/2018  Blood Pressure : 130/80  Pulse: 78  Respiration: 16    Anticoagulation Summary  As of 12/4/2018    INR goal:   2.5-3.5   TTR:   48.5 % (3.4 y)   Today's INR:   1.8!   Warfarin maintenance plan:   7.5 mg (5 mg x 1.5) on Tue, Fri; 5 mg (5 mg x 1) all other days   Weekly warfarin total:   40 mg   Plan last modified:   CORAL Gaviria (12/4/2018)   Next INR check:   12/11/2018   Target end date:   Indefinite    Indications    CVA (cerebral vascular accident) (HCC) [I63.9]  History of heart valve replacement with mechanical valve [V43.3] [Z95.2]             Anticoagulation Episode Summary     INR check location:       Preferred lab:       Send INR reminders to:       Comments:         Anticoagulation Care Providers     Provider Role Specialty Phone number    Yuridia AUBrooke Escalante, PharmD Responsible          Anticoagulation Patient Findings      THIS VISIT CONDUCTED WITH [employer] UTILIZING SECURE AND ENCRYPTED VIDEOCONFERENCING EQUIPMENT  ROS:    Pulm: Denies SOB, chest pain.    Card: Denies syncope, edema, palpitations.    Extremities: Denies redness, pain.    PE:    Pulm: No SOB, even and unlabored.    Card: Normal rate and rhythm.   Extremities: No redness, or edema.     INR  sub-therapeutic.    Denies signs/symptoms of bleeding and/or thrombosis.    Denies changes to diet or medications.   Follow up appt in 1 weeks     Plan:  Take 7.5 mg on Tue & fri, 5 mg rest of the week    Medication: Warfarin (Coumadin)     Sunday Monday Tuesday Wednesday Thursday Friday Saturday      5 mg    5 mg    7.5 mg    5 mg    5 mg    7.5 mg    5 mg      1 tab(s)    1 tab(s)    1.5 tab(s)    1 tab(s)    1 tab(s)    1.5 tab(s)  1 tab(s)     Next Appointment: Tuesday, Dec 11 @ 3:45     Review all of your home medications and newly ordered medications with your doctor and / or pharmacist. Follow medication instructions as directed by your doctor and / or  pharmacist. Please keep your medication list with you and share with your physician. Update the information when medications are discontinued, doses are changed, or new medications (including over-the-counter products) are added; and carry medication information at all times in the event of emergency situations.      For questions, please contact Outpatient Anticoagulation Service [phone].   The patient is on a high risk medication and is sub- therapeutic. This could lead to clot formation or risk of stroke. Therefore this patient requires close monitoring and follow up.        CHEST guidelines recommend frequent INR monitoring at regular intervals (a few days up to a max of 12 weeks) to ensure they are on the proper dose of warfarin and not having any complications from therapy.  INRs can dramatically change over a short time period due to diet, medications, and medical conditions.   The patient instructed to go to the ER for falls with a head injury,  blood in urine or stool or any bleeding that last longer than 20 min.     MARY Gaviria.

## 2018-12-11 ENCOUNTER — APPOINTMENT (OUTPATIENT)
Dept: VASCULAR LAB | Facility: MEDICAL CENTER | Age: 83
End: 2018-12-11
Payer: MEDICARE

## 2019-01-11 ENCOUNTER — TELEPHONE (OUTPATIENT)
Dept: VASCULAR LAB | Facility: MEDICAL CENTER | Age: 84
End: 2019-01-11

## 2019-01-22 ENCOUNTER — ANTICOAGULATION MONITORING (OUTPATIENT)
Dept: VASCULAR LAB | Facility: MEDICAL CENTER | Age: 84
End: 2019-01-22
Payer: MEDICARE

## 2019-01-22 ENCOUNTER — NON-PROVIDER VISIT (OUTPATIENT)
Dept: MEDICAL GROUP | Facility: PHYSICIAN GROUP | Age: 84
End: 2019-01-22
Payer: MEDICARE

## 2019-01-22 ENCOUNTER — APPOINTMENT (OUTPATIENT)
Dept: VASCULAR LAB | Facility: MEDICAL CENTER | Age: 84
End: 2019-01-22
Payer: MEDICARE

## 2019-01-22 VITALS
HEART RATE: 60 BPM | OXYGEN SATURATION: 98 % | DIASTOLIC BLOOD PRESSURE: 52 MMHG | SYSTOLIC BLOOD PRESSURE: 128 MMHG | RESPIRATION RATE: 12 BRPM

## 2019-01-22 DIAGNOSIS — I63.19 CEREBROVASCULAR ACCIDENT (CVA) DUE TO EMBOLISM OF OTHER PRECEREBRAL ARTERY (HCC): ICD-10-CM

## 2019-01-22 DIAGNOSIS — Z79.01 CHRONIC ANTICOAGULATION: ICD-10-CM

## 2019-01-22 DIAGNOSIS — Z95.2 HISTORY OF HEART VALVE REPLACEMENT WITH MECHANICAL VALVE: ICD-10-CM

## 2019-01-22 LAB
INR BLD: 2.2 (ref 0.9–1.2)
INR PPP: 2.2 (ref 2–3.5)
POC PROTIME: ABNORMAL

## 2019-01-22 PROCEDURE — 99212 OFFICE O/P EST SF 10 MIN: CPT | Performed by: NURSE PRACTITIONER

## 2019-01-22 PROCEDURE — 85610 PROTHROMBIN TIME: CPT | Performed by: NURSE PRACTITIONER

## 2019-01-23 NOTE — PROGRESS NOTES
OP Anticoagulation Service Note    Date: 1/22/2019  Blood Pressure : 128/52  Pulse: 60  Respiration: 12    Anticoagulation Summary  As of 1/22/2019    INR goal:   2.5-3.5   TTR:   46.6 % (3.5 y)   Today's INR:   2.2!   Warfarin maintenance plan:   5 mg (5 mg x 1) every day   Weekly warfarin total:   35 mg   Plan last modified:   CORAL Gaviria (1/22/2019)   Next INR check:   3/12/2019   Target end date:   Indefinite    Indications    CVA (cerebral vascular accident) (HCC) [I63.9]  History of heart valve replacement with mechanical valve [V43.3] [Z95.2]             Anticoagulation Episode Summary     INR check location:       Preferred lab:       Send INR reminders to:       Comments:         Anticoagulation Care Providers     Provider Role Specialty Phone number    Yuridia Escalante, PharmD Responsible          Anticoagulation Patient Findings      THIS VISIT CONDUCTED WITH [employer] UTILIZING SECURE AND ENCRYPTED VIDEOCONFERENCING EQUIPMENT  ROS:    Pulm: Denies SOB, chest pain.    Card: Denies syncope, edema, palpitations.    Extremities: Denies redness, pain.    PE:    Pulm: No SOB, even and unlabored.    Card: Normal rate and rhythm.   Extremities: No redness, or edema.     INR  is-therapeutic.  Was taking 5 mg daily and is therapeutic   Denies signs/symptoms of bleeding and/or thrombosis.    Denies changes to diet or medications.   Follow up appt in 7 weeks     Plan:  Continue current medication regimen.       Medication: Warfarin (Coumadin)     Sunday Monday Tuesday Wednesday Thursday Friday Saturday      5 mg    5 mg    5 mg    5 mg    5 mg    5 mg    5 mg      1 tab(s)    1 tab(s)    1 tab(s)    1 tab(s)    1 tab(s)    1 tab(s)  1 tab(s)     Next Appointment: Tuesday, Feb 12 @ 4:30     Review all of your home medications and newly ordered medications with your doctor and / or pharmacist. Follow medication instructions as directed by your doctor and / or pharmacist. Please  keep your medication list with you and share with your physician. Update the information when medications are discontinued, doses are changed, or new medications (including over-the-counter products) are added; and carry medication information at all times in the event of emergency situations.      For questions, please contact Outpatient Anticoagulation Service [phone].        Patient is on a high risk medication and therefore requires close monitoring and follow up.     CHEST guidelines recommend frequent INR monitoring at regular intervals (a few days up to a max of 12 weeks) to ensure they are on the proper dose of warfarin and not having any complications from therapy.  INRs can dramatically change over a short time period due to diet, medications, and medical conditions.   The patient instructed to go to the ER for falls with a head injury,  blood in urine or stool or any bleeding that last longer than 20 min.     MARY Gaviria.

## 2019-04-05 ENCOUNTER — ANTICOAGULATION VISIT (OUTPATIENT)
Dept: MEDICAL GROUP | Facility: PHYSICIAN GROUP | Age: 84
End: 2019-04-05
Payer: MEDICARE

## 2019-04-05 DIAGNOSIS — Z95.2 HISTORY OF HEART VALVE REPLACEMENT WITH MECHANICAL VALVE: ICD-10-CM

## 2019-04-05 DIAGNOSIS — Z79.01 CHRONIC ANTICOAGULATION: Primary | ICD-10-CM

## 2019-04-05 LAB — INR PPP: 1.9 (ref 2–3.5)

## 2019-04-05 PROCEDURE — 99211 OFF/OP EST MAY X REQ PHY/QHP: CPT | Performed by: NURSE PRACTITIONER

## 2019-04-05 PROCEDURE — 85610 PROTHROMBIN TIME: CPT | Performed by: NURSE PRACTITIONER

## 2019-04-05 NOTE — PROGRESS NOTES
OP Anticoagulation Service Note    Date: 2019  There were no vitals filed for this visit.    Anticoagulation Summary  As of 2019    INR goal:   2.5-3.5   TTR:   44.1 % (3.7 y)   INR used for dosin.9! (2019)   Warfarin maintenance plan:   5 mg (5 mg x 1) every day   Weekly warfarin total:   35 mg   Plan last modified:   CORAL Gaviria (2019)   Next INR check:   2019   Target end date:   Indefinite    Indications    CVA (cerebral vascular accident) (HCC) [I63.9]  History of heart valve replacement with mechanical valve [V43.3] [Z95.2]             Anticoagulation Episode Summary     INR check location:       Preferred lab:       Send INR reminders to:       Comments:   [facility]      Anticoagulation Care Providers     Provider Role Specialty Phone number    Yuridia Escalante, PharmD Responsible          Anticoagulation Patient Findings      HPI:   Adrienne Mejia seen in clinic today, they are here today for a INR check on anticoagulation therapy with warfarin because they have CVA    The reason for today's visit is to prevent morbidity and mortality from a stroke  and to reduce the risk of bleeding while on a anticoagulant.     Additional education provided today regarding reducing bleed risk and dietary constraints:  About how vitamin K and foods work with warfarin and the bleeding risk on a anticoagulant     Any upcoming procedures:   none    Confirmed warfarin dosing regimen  Interval Changes with foods rich in vitamin K: No  Interval Changes in ETOH:   No  Interval Changes in smoking status:  No  Interval Changes in medication:  No   Cost restriction:  No    S/S of bleeding or bruising:  No  Signs/symptoms  thrombosis since the last appt:  No  Bleed risk is:  moderate,     3 vitals included with today's appt :  (BP, HR, weight, ht, RR)     Assessment:   INR  sub-therapeutic.        a change is needed today because INR is out of range.      They have a TTR of 44  which  is not at target (TTR target/goal is 100%) and requires close follow up to prevent a adverse event (the lower the TTR the higher risk of clots, strokes, or bleeding).       Plan:  7.5mg today then continue weekly warfarin dose as noted      Follow up:  Follow up appointment in 3 week(s)       Other info:  Pt educated to contact our clinic with any changes in medications or s/s of bleeding or thrombosis    CHEST guidelines recommend frequent INR monitoring at regular intervals (a few days up to a max of 12 weeks) to ensure they are on the proper dose of warfarin and not having any complications from therapy.  INRs can dramatically change over a short time period due to diet, medications, and medical conditions.

## 2019-04-23 ENCOUNTER — NON-PROVIDER VISIT (OUTPATIENT)
Dept: MEDICAL GROUP | Facility: PHYSICIAN GROUP | Age: 84
End: 2019-04-23
Payer: MEDICARE

## 2019-04-23 ENCOUNTER — APPOINTMENT (OUTPATIENT)
Dept: VASCULAR LAB | Facility: MEDICAL CENTER | Age: 84
End: 2019-04-23
Payer: MEDICARE

## 2019-04-23 ENCOUNTER — ANTICOAGULATION MONITORING (OUTPATIENT)
Dept: VASCULAR LAB | Facility: MEDICAL CENTER | Age: 84
End: 2019-04-23
Payer: MEDICARE

## 2019-04-23 VITALS
HEART RATE: 76 BPM | RESPIRATION RATE: 16 BRPM | OXYGEN SATURATION: 96 % | DIASTOLIC BLOOD PRESSURE: 68 MMHG | SYSTOLIC BLOOD PRESSURE: 108 MMHG

## 2019-04-23 DIAGNOSIS — D68.9 COAGULATION DISORDER (HCC): ICD-10-CM

## 2019-04-23 DIAGNOSIS — I63.311 CEREBROVASCULAR ACCIDENT (CVA) DUE TO THROMBOSIS OF RIGHT MIDDLE CEREBRAL ARTERY (HCC): ICD-10-CM

## 2019-04-23 DIAGNOSIS — Z95.2 HISTORY OF HEART VALVE REPLACEMENT WITH MECHANICAL VALVE: ICD-10-CM

## 2019-04-23 LAB
INR BLD: 2.4 (ref 0.9–1.2)
INR PPP: 2.4 (ref 2–3.5)
POC PROTIME: ABNORMAL

## 2019-04-23 PROCEDURE — 99212 OFFICE O/P EST SF 10 MIN: CPT | Performed by: NURSE PRACTITIONER

## 2019-04-23 PROCEDURE — 85610 PROTHROMBIN TIME: CPT | Performed by: NURSE PRACTITIONER

## 2019-04-23 NOTE — PROGRESS NOTES
OP Anticoagulation Service Note    Date: 2019  Blood Pressure : 108/68  Pulse: 76  Respiration: 16    Anticoagulation Summary  As of 2019    INR goal:   2.5-3.5   TTR:   43.5 % (3.8 y)   INR used for dosin.40! (2019)   Warfarin maintenance plan:   7.5 mg (5 mg x 1.5) every Tue; 5 mg (5 mg x 1) all other days   Weekly warfarin total:   37.5 mg   Plan last modified:   CORAL Gaviria (2019)   Next INR check:   2019   Target end date:   Indefinite    Indications    CVA (cerebral vascular accident) (HCC) [I63.9]  History of heart valve replacement with mechanical valve [V43.3] [Z95.2]             Anticoagulation Episode Summary     INR check location:       Preferred lab:       Send INR reminders to:       Comments:   [facility] tele[facility]      Anticoagulation Care Providers     Provider Role Specialty Phone number    Yuridia Escalante, PharmD Responsible          Anticoagulation Patient Findings      THIS VISIT CONDUCTED WITH [employer] UTILIZING SECURE AND ENCRYPTED VIDEOCONFERENCING EQUIPMENT  ROS:    Pulm: Denies SOB, chest pain.    Card: Denies syncope, edema, palpitations.    Extremities: Denies redness, pain.    PE:    Pulm: No SOB, even and unlabored.    Card: Normal rate and rhythm.   Extremities: No redness, or edema.     INR  sub-therapeutic.    Denies signs/symptoms of bleeding and/or thrombosis.    Denies changes to diet or medications.   Follow up appt in 1 weeks     Plan:  7.5 mg tonight     Medication: Warfarin (Coumadin)           5 mg    5 mg    7.5 mg    5 mg    5 mg    5 mg    5 mg      1 tab(s)    1 tab(s)    1.5 tab(s)    1 tab(s)    1 tab(s)    1 tab(s)  1 tab(s)     Next Appointment:  @ 3:00    Review all of your home medications and newly ordered medications with your doctor and / or pharmacist. Follow medication instructions as directed by your doctor and / or  pharmacist. Please keep your medication list with you and share with your physician. Update the information when medications are discontinued, doses are changed, or new medications (including over-the-counter products) are added; and carry medication information at all times in the event of emergency situations.      For questions, please contact Outpatient Anticoagulation Service [phone].   The patient is on a high risk medication and is sub- therapeutic. This could lead to clot formation or risk of stroke. Therefore this patient requires close monitoring and follow up.      CHEST guidelines recommend frequent INR monitoring at regular intervals (a few days up to a max of 12 weeks) to ensure they are on the proper dose of warfarin and not having any complications from therapy.  INRs can dramatically change over a short time period due to diet, medications, and medical conditions.   The patient instructed to go to the ER for falls with a head injury,  blood in urine or stool or any bleeding that last longer than 20 min.     MARY Gaviria.

## 2019-05-07 ENCOUNTER — APPOINTMENT (OUTPATIENT)
Dept: VASCULAR LAB | Facility: MEDICAL CENTER | Age: 84
End: 2019-05-07
Payer: MEDICARE

## 2019-05-07 ENCOUNTER — NON-PROVIDER VISIT (OUTPATIENT)
Dept: MEDICAL GROUP | Facility: PHYSICIAN GROUP | Age: 84
End: 2019-05-07
Payer: MEDICARE

## 2019-05-07 ENCOUNTER — ANTICOAGULATION MONITORING (OUTPATIENT)
Dept: VASCULAR LAB | Facility: MEDICAL CENTER | Age: 84
End: 2019-05-07
Payer: MEDICARE

## 2019-05-07 VITALS
RESPIRATION RATE: 12 BRPM | SYSTOLIC BLOOD PRESSURE: 112 MMHG | OXYGEN SATURATION: 98 % | DIASTOLIC BLOOD PRESSURE: 62 MMHG | HEART RATE: 66 BPM

## 2019-05-07 DIAGNOSIS — I65.22 STENOSIS OF LEFT CAROTID ARTERY: ICD-10-CM

## 2019-05-07 DIAGNOSIS — Z79.01 CHRONIC ANTICOAGULATION: ICD-10-CM

## 2019-05-07 DIAGNOSIS — Z95.2 HISTORY OF HEART VALVE REPLACEMENT WITH MECHANICAL VALVE: ICD-10-CM

## 2019-05-07 DIAGNOSIS — Z79.01 CHRONIC ANTICOAGULATION: Chronic | ICD-10-CM

## 2019-05-07 LAB
INR BLD: 2.4 (ref 0.9–1.2)
INR PPP: 2.4 (ref 2–3.5)
POC PROTIME: ABNORMAL

## 2019-05-07 PROCEDURE — 99212 OFFICE O/P EST SF 10 MIN: CPT | Performed by: NURSE PRACTITIONER

## 2019-05-07 PROCEDURE — 85610 PROTHROMBIN TIME: CPT | Performed by: FAMILY MEDICINE

## 2019-05-07 NOTE — PROGRESS NOTES
OP Anticoagulation Service Note    Date: 2019  Blood Pressure : 112/62  Pulse: 66  Respiration: 12    Anticoagulation Summary  As of 2019    INR goal:   2.5-3.5   TTR:   43.1 % (3.8 y)   INR used for dosin.40! (2019)   Warfarin maintenance plan:   7.5 mg (5 mg x 1.5) every Tue; 5 mg (5 mg x 1) all other days   Weekly warfarin total:   37.5 mg   Plan last modified:   CORAL Gaviria (2019)   Next INR check:   2019   Target end date:   Indefinite    Indications    CVA (cerebral vascular accident) (HCC) [I63.9]  History of heart valve replacement with mechanical valve [V43.3] [Z95.2]             Anticoagulation Episode Summary     INR check location:       Preferred lab:       Send INR reminders to:       Comments:   [facility]      Anticoagulation Care Providers     Provider Role Specialty Phone number    Yuridia Escalante, PharmD Responsible          Anticoagulation Patient Findings      THIS VISIT CONDUCTED WITH [employer] UTILIZING SECURE AND ENCRYPTED VIDEOCONFERENCING EQUIPMENT  ROS:    Pulm: Denies SOB, chest pain.    Card: Denies syncope, edema, palpitations.    Extremities: Denies redness, pain.    PE:    Pulm: No SOB, even and unlabored.    Card: Normal rate and rhythm.   Extremities: No redness, or edema.     INR  sub-therapeutic.    Denies signs/symptoms of bleeding and/or thrombosis.    Denies changes to diet or medications.   Follow up appt in 2 weeks     Plan:  7.5 mg every tuesday 5 mg rest of the week.     Medication: Warfarin (Coumadin)           5 mg    5 mg    7.5 mg    5 mg    5 mg    5 mg    5 mg      1 tab(s)    1 tab(s)    1.5 tab(s)    1 tab(s)    1 tab(s)    1 tab(s)  1 tab(s)     Next Appointment: Tuesday, May 21 @ 3:00    Review all of your home medications and newly ordered medications with your doctor and / or pharmacist. Follow medication instructions as directed  by your doctor and / or pharmacist. Please keep your medication list with you and share with your physician. Update the information when medications are discontinued, doses are changed, or new medications (including over-the-counter products) are added; and carry medication information at all times in the event of emergency situations.      For questions, please contact Outpatient Anticoagulation Service [phone].        Patient is on a high risk medication and therefore requires close monitoring and follow up.     CHEST guidelines recommend frequent INR monitoring at regular intervals (a few days up to a max of 12 weeks) to ensure they are on the proper dose of warfarin and not having any complications from therapy.  INRs can dramatically change over a short time period due to diet, medications, and medical conditions.   The patient instructed to go to the ER for falls with a head injury,  blood in urine or stool or any bleeding that last longer than 20 min.     MARY Gaviria.

## 2019-05-21 ENCOUNTER — ANTICOAGULATION MONITORING (OUTPATIENT)
Dept: VASCULAR LAB | Facility: MEDICAL CENTER | Age: 84
End: 2019-05-21
Payer: MEDICARE

## 2019-05-21 ENCOUNTER — NON-PROVIDER VISIT (OUTPATIENT)
Dept: MEDICAL GROUP | Facility: PHYSICIAN GROUP | Age: 84
End: 2019-05-21
Payer: MEDICARE

## 2019-05-21 ENCOUNTER — APPOINTMENT (OUTPATIENT)
Dept: MEDICAL GROUP | Facility: PHYSICIAN GROUP | Age: 84
End: 2019-05-21
Payer: MEDICARE

## 2019-05-21 VITALS
RESPIRATION RATE: 18 BRPM | SYSTOLIC BLOOD PRESSURE: 150 MMHG | OXYGEN SATURATION: 98 % | HEART RATE: 71 BPM | DIASTOLIC BLOOD PRESSURE: 80 MMHG

## 2019-05-21 DIAGNOSIS — I63.239 CEREBROVASCULAR ACCIDENT (CVA) DUE TO OCCLUSION OF CAROTID ARTERY, UNSPECIFIED BLOOD VESSEL LATERALITY (HCC): ICD-10-CM

## 2019-05-21 DIAGNOSIS — D68.9 COAGULATION DISORDER (HCC): ICD-10-CM

## 2019-05-21 DIAGNOSIS — Z95.2 HISTORY OF HEART VALVE REPLACEMENT WITH MECHANICAL VALVE: ICD-10-CM

## 2019-05-21 LAB
INR BLD: 2.6 (ref 0.9–1.2)
INR PPP: 2.6 (ref 2–3.5)
POC PROTIME: ABNORMAL

## 2019-05-21 PROCEDURE — 99212 OFFICE O/P EST SF 10 MIN: CPT | Performed by: NURSE PRACTITIONER

## 2019-05-21 PROCEDURE — 85610 PROTHROMBIN TIME: CPT | Performed by: PHYSICIAN ASSISTANT

## 2019-05-21 NOTE — PROGRESS NOTES
OP Anticoagulation Service Note    Date: 2019  Blood Pressure : 160/90  Pulse: 71  Respiration: 18    Anticoagulation Summary  As of 2019    INR goal:   2.5-3.5   TTR:   43.2 % (3.8 y)   INR used for dosin.60 (2019)   Warfarin maintenance plan:   7.5 mg (5 mg x 1.5) every Tue; 5 mg (5 mg x 1) all other days   Weekly warfarin total:   37.5 mg   Plan last modified:   CORAL Gaviria (2019)   Next INR check:   2019   Target end date:   Indefinite    Indications    CVA (cerebral vascular accident) (HCC) [I63.9]  History of heart valve replacement with mechanical valve [V43.3] [Z95.2]             Anticoagulation Episode Summary     INR check location:       Preferred lab:       Send INR reminders to:       Comments:   [facility] tele[facility]      Anticoagulation Care Providers     Provider Role Specialty Phone number    Yuridia Escalante, PharmD Responsible          Anticoagulation Patient Findings      THIS VISIT CONDUCTED WITH [employer] UTILIZING SECURE AND ENCRYPTED VIDEOCONFERENCING EQUIPMENT  ROS:    Pulm: Denies SOB, chest pain.    Card: Denies syncope, edema, palpitations.    Extremities: Denies redness, pain.    PE:    Pulm: No SOB, even and unlabored.    Card: Normal rate and rhythm.   Extremities: No redness, or edema.     INR  is-therapeutic.    Denies signs/symptoms of bleeding and/or thrombosis.    Denies changes to diet or medications.   Follow up appt in 2 weeks     Plan:  Continue current medication regimen.     Medication: Warfarin (Coumadin)           5 mg    5 mg    7.5 mg    5 mg    5 mg    5 mg    5 mg      1 tab(s)    1 tab(s)    1.5 tab(s)    1 tab(s)    1 tab(s)    1 tab(s)  1 tab(s)     Next Appointment:  @  2:45    Review all of your home medications and newly ordered medications with your doctor and / or pharmacist. Follow medication instructions as directed by  your doctor and / or pharmacist. Please keep your medication list with you and share with your physician. Update the information when medications are discontinued, doses are changed, or new medications (including over-the-counter products) are added; and carry medication information at all times in the event of emergency situations.      For questions, please contact Outpatient Anticoagulation Service [phone].        Patient is on a high risk medication and therefore requires close monitoring and follow up.     CHEST guidelines recommend frequent INR monitoring at regular intervals (a few days up to a max of 12 weeks) to ensure they are on the proper dose of warfarin and not having any complications from therapy.  INRs can dramatically change over a short time period due to diet, medications, and medical conditions.   The patient instructed to go to the ER for falls with a head injury,  blood in urine or stool or any bleeding that last longer than 20 min.     MARY Gaviria.

## 2019-06-04 ENCOUNTER — APPOINTMENT (OUTPATIENT)
Dept: MEDICAL GROUP | Facility: PHYSICIAN GROUP | Age: 84
End: 2019-06-04
Payer: MEDICARE

## 2019-06-04 ENCOUNTER — ANTICOAGULATION MONITORING (OUTPATIENT)
Dept: VASCULAR LAB | Facility: MEDICAL CENTER | Age: 84
End: 2019-06-04
Payer: MEDICARE

## 2019-06-04 ENCOUNTER — NON-PROVIDER VISIT (OUTPATIENT)
Dept: MEDICAL GROUP | Facility: PHYSICIAN GROUP | Age: 84
End: 2019-06-04
Payer: MEDICARE

## 2019-06-04 VITALS
HEART RATE: 54 BPM | DIASTOLIC BLOOD PRESSURE: 62 MMHG | SYSTOLIC BLOOD PRESSURE: 120 MMHG | RESPIRATION RATE: 12 BRPM | OXYGEN SATURATION: 94 %

## 2019-06-04 DIAGNOSIS — Z95.2 HISTORY OF HEART VALVE REPLACEMENT WITH MECHANICAL VALVE: ICD-10-CM

## 2019-06-04 DIAGNOSIS — I63.039 CEREBROVASCULAR ACCIDENT (CVA) DUE TO THROMBOSIS OF CAROTID ARTERY, UNSPECIFIED BLOOD VESSEL LATERALITY (HCC): ICD-10-CM

## 2019-06-04 DIAGNOSIS — Z79.01 CHRONIC ANTICOAGULATION: ICD-10-CM

## 2019-06-04 LAB
INR BLD: 2.8 (ref 0.9–1.2)
INR PPP: 2.8 (ref 2–3.5)
POC PROTIME: ABNORMAL

## 2019-06-04 PROCEDURE — 85610 PROTHROMBIN TIME: CPT | Performed by: FAMILY MEDICINE

## 2019-06-04 PROCEDURE — 99212 OFFICE O/P EST SF 10 MIN: CPT | Performed by: NURSE PRACTITIONER

## 2019-06-04 NOTE — PROGRESS NOTES
OP Anticoagulation Service Note    Date: 2019  Blood Pressure : 120/62  Pulse: (!) 54  Respiration: 12    Anticoagulation Summary  As of 2019    INR goal:   2.5-3.5   TTR:   43.7 % (3.9 y)   INR used for dosin.80 (2019)   Warfarin maintenance plan:   7.5 mg (5 mg x 1.5) every Tue; 5 mg (5 mg x 1) all other days   Weekly warfarin total:   37.5 mg   Plan last modified:   CORAL Gaviria (2019)   Next INR check:   2019   Target end date:   Indefinite    Indications    CVA (cerebral vascular accident) (HCC) [I63.9]  History of heart valve replacement with mechanical valve [V43.3] [Z95.2]             Anticoagulation Episode Summary     INR check location:       Preferred lab:       Send INR reminders to:       Comments:   [facility] telemedicine      Anticoagulation Care Providers     Provider Role Specialty Phone number    Yuridia Escalante, PharmD Responsible          Anticoagulation Patient Findings      THIS VISIT CONDUCTED WITH [employer] TELE[employer] UTILIZING SECURE AND ENCRYPTED VIDEOCONFERENCING EQUIPMENT  ROS:    Pulm: Denies SOB, chest pain.    Card: Denies syncope, edema, palpitations.    Extremities: Denies redness, pain.    PE:    Pulm: No SOB, even and unlabored.    Card: Normal rate and rhythm.   Extremities: No redness, or edema.     INR  is-therapeutic.    Denies signs/symptoms of bleeding and/or thrombosis.    Denies changes to diet or medications.   Follow up appt in 2 weeks     Plan:  Take 5 mg tonight (1 tab) then follow the dose below    Medication: Warfarin (Coumadin)           5 mg    5 mg    7.5 mg    5 mg    5 mg    5 mg    5 mg      1 tab(s)    1 tab(s)    1.5 tab(s)    1 tab(s)    1 tab(s)    1 tab(s)  1 tab(s)     Next Appointment:  @ 3:00    Review all of your home medications and newly ordered medications with your doctor and / or pharmacist. Follow medication instructions  as directed by your doctor and / or pharmacist. Please keep your medication list with you and share with your physician. Update the information when medications are discontinued, doses are changed, or new medications (including over-the-counter products) are added; and carry medication information at all times in the event of emergency situations.      For questions, please contact Outpatient Anticoagulation Service [phone].      Patient is on a high risk medication and therefore requires close monitoring and follow up.     CHEST guidelines recommend frequent INR monitoring at regular intervals (a few days up to a max of 12 weeks) to ensure they are on the proper dose of warfarin and not having any complications from therapy.  INRs can dramatically change over a short time period due to diet, medications, and medical conditions.   The patient instructed to go to the ER for falls with a head injury,  blood in urine or stool or any bleeding that last longer than 20 min.     MARY Gaviria.

## 2019-06-18 ENCOUNTER — NON-PROVIDER VISIT (OUTPATIENT)
Dept: MEDICAL GROUP | Facility: PHYSICIAN GROUP | Age: 84
End: 2019-06-18
Payer: MEDICARE

## 2019-06-18 ENCOUNTER — APPOINTMENT (OUTPATIENT)
Dept: MEDICAL GROUP | Facility: PHYSICIAN GROUP | Age: 84
End: 2019-06-18
Payer: MEDICARE

## 2019-06-18 ENCOUNTER — ANTICOAGULATION MONITORING (OUTPATIENT)
Dept: VASCULAR LAB | Facility: MEDICAL CENTER | Age: 84
End: 2019-06-18
Payer: MEDICARE

## 2019-06-18 VITALS
OXYGEN SATURATION: 97 % | SYSTOLIC BLOOD PRESSURE: 110 MMHG | HEART RATE: 64 BPM | DIASTOLIC BLOOD PRESSURE: 58 MMHG | RESPIRATION RATE: 14 BRPM

## 2019-06-18 DIAGNOSIS — I63.039 CEREBROVASCULAR ACCIDENT (CVA) DUE TO THROMBOSIS OF CAROTID ARTERY, UNSPECIFIED BLOOD VESSEL LATERALITY (HCC): ICD-10-CM

## 2019-06-18 DIAGNOSIS — Z95.2 HISTORY OF HEART VALVE REPLACEMENT WITH MECHANICAL VALVE: ICD-10-CM

## 2019-06-18 DIAGNOSIS — Z79.01 CHRONIC ANTICOAGULATION: ICD-10-CM

## 2019-06-18 LAB
INR BLD: 1.5 (ref 0.9–1.2)
INR PPP: 1.5 (ref 2–3.5)
POC PROTIME: ABNORMAL

## 2019-06-18 PROCEDURE — 85610 PROTHROMBIN TIME: CPT | Performed by: FAMILY MEDICINE

## 2019-06-18 PROCEDURE — 99212 OFFICE O/P EST SF 10 MIN: CPT | Performed by: NURSE PRACTITIONER

## 2019-06-18 NOTE — PROGRESS NOTES
OP Anticoagulation Service Note    Date: 2019  Blood Pressure : 110/58  Pulse: 64  Respiration: 14    Anticoagulation Summary  As of 2019    INR goal:   2.5-3.5   TTR:   43.5 % (3.9 y)   INR used for dosin.50! (2019)   Warfarin maintenance plan:   7.5 mg (5 mg x 1.5) every Tue; 5 mg (5 mg x 1) all other days   Weekly warfarin total:   37.5 mg   Plan last modified:   CORAL Gaviira (2019)   Next INR check:   2019   Target end date:   Indefinite    Indications    CVA (cerebral vascular accident) (HCC) [I63.9]  History of heart valve replacement with mechanical valve [V43.3] [Z95.2]             Anticoagulation Episode Summary     INR check location:       Preferred lab:       Send INR reminders to:       Comments:   [facility] telemedicine      Anticoagulation Care Providers     Provider Role Specialty Phone number    Yuridia Escalante, PharmD Responsible          Anticoagulation Patient Findings      THIS VISIT CONDUCTED WITH [employer] TELE[employer] UTILIZING SECURE AND ENCRYPTED VIDEOCONFERENCING EQUIPMENT  ROS:    Pulm: Denies SOB, chest pain.    Card: Denies syncope, edema, palpitations.    Extremities: Denies redness, pain.    PE:    Pulm: No SOB, even and unlabored.    Card: Normal rate and rhythm.   Extremities: No redness, or edema.     INR  sub-therapeutic.    Denies signs/symptoms of bleeding and/or thrombosis.    Denies changes to diet or medications.   Follow up appt in 1 weeks     Plan:  Increase dose see below    Medication: Warfarin (Coumadin)           5 mg    5 mg    7.5 mg    7.5 mg    7.5 mg    5 mg    5 mg      1 tab(s)    1 tab(s)    1.5 tab(s)    1.5 tab(s)    1.5 tab(s)    1 tab(s)  1 tab(s)     Next Appointment:  @ 2:45     Review all of your home medications and newly ordered medications with your doctor and / or pharmacist. Follow medication instructions as directed by your  doctor and / or pharmacist. Please keep your medication list with you and share with your physician. Update the information when medications are discontinued, doses are changed, or new medications (including over-the-counter products) are added; and carry medication information at all times in the event of emergency situations.      For questions, please contact Outpatient Anticoagulation Service [phone].   The patient is on a high risk medication and is sub- therapeutic. This could lead to clot formation or risk of stroke. Therefore this patient requires close monitoring and follow up.        CHEST guidelines recommend frequent INR monitoring at regular intervals (a few days up to a max of 12 weeks) to ensure they are on the proper dose of warfarin and not having any complications from therapy.  INRs can dramatically change over a short time period due to diet, medications, and medical conditions.   The patient instructed to go to the ER for falls with a head injury,  blood in urine or stool or any bleeding that last longer than 20 min.     MARY Gaviria.

## 2019-07-12 ENCOUNTER — ANTICOAGULATION MONITORING (OUTPATIENT)
Dept: MEDICAL GROUP | Facility: PHYSICIAN GROUP | Age: 84
End: 2019-07-12

## 2019-07-12 ENCOUNTER — HOSPITAL ENCOUNTER (OUTPATIENT)
Dept: LAB | Facility: MEDICAL CENTER | Age: 84
End: 2019-07-12
Attending: NURSE PRACTITIONER
Payer: MEDICARE

## 2019-07-12 DIAGNOSIS — Z95.2 HISTORY OF HEART VALVE REPLACEMENT WITH MECHANICAL VALVE: ICD-10-CM

## 2019-07-12 LAB
INR PPP: 2.66 (ref 0.87–1.13)
PROTHROMBIN TIME: 29.3 SEC (ref 12–14.6)

## 2019-07-12 PROCEDURE — 36415 COLL VENOUS BLD VENIPUNCTURE: CPT

## 2019-07-12 PROCEDURE — 85610 PROTHROMBIN TIME: CPT

## 2019-07-15 ENCOUNTER — ANTICOAGULATION MONITORING (OUTPATIENT)
Dept: VASCULAR LAB | Facility: MEDICAL CENTER | Age: 84
End: 2019-07-15

## 2019-07-15 DIAGNOSIS — I63.9 CEREBROVASCULAR ACCIDENT (CVA), UNSPECIFIED MECHANISM (HCC): ICD-10-CM

## 2019-07-15 DIAGNOSIS — Z95.2 HISTORY OF HEART VALVE REPLACEMENT WITH MECHANICAL VALVE: ICD-10-CM

## 2019-07-15 NOTE — PROGRESS NOTES
Anticoagulation Summary  As of 7/15/2019    INR goal:   2.5-3.5   TTR:   43.0 % (4 y)   INR used for dosin.66 (2019)   Warfarin maintenance plan:   7.5 mg (5 mg x 1.5) every Tue; 5 mg (5 mg x 1) all other days   Weekly warfarin total:   37.5 mg   No change documented:   Sean Maxwell, Med Ass't   Plan last modified:   CORAL Gaviria (2019)   Next INR check:   2019   Target end date:   Indefinite    Indications    CVA (cerebral vascular accident) (HCC) [I63.9]  History of heart valve replacement with mechanical valve [V43.3] [Z95.2]             Anticoagulation Episode Summary     INR check location:       Preferred lab:       Send INR reminders to:       Comments:   [place] telemedicine      Anticoagulation Care Providers     Provider Role Specialty Phone number    Caterina HolderD Responsible          Anticoagulation Patient Findings  Patient Findings     Negatives:   Signs/symptoms of thrombosis, Signs/symptoms of bleeding, Laboratory test error suspected, Change in health, Change in alcohol use, Change in activity, Upcoming invasive procedure, Emergency department visit, Upcoming dental procedure, Missed doses, Extra doses, Change in medications, Change in diet/appetite, Hospital admission, Bruising, Other complaints        Left voicemail message to report therapeutic INR of 2.6.  Patient to continue with current warfarin dosing regimen. Requested pt contact the clinic for any change to diet or medication, and to report any signs or symptoms of bleeding, bruising or clotting.  Pt to follow up in 2 weeks.    Sean Maxwell, Med Ass't    I have reviewed and am in agreement with the above stated plan on 7-15-19.  Galileo Ramirez, CaterinaD, BCACP

## 2019-07-30 ENCOUNTER — ANTICOAGULATION MONITORING (OUTPATIENT)
Dept: VASCULAR LAB | Facility: MEDICAL CENTER | Age: 84
End: 2019-07-30

## 2019-07-30 DIAGNOSIS — Z95.2 HISTORY OF HEART VALVE REPLACEMENT WITH MECHANICAL VALVE: ICD-10-CM

## 2019-07-30 DIAGNOSIS — I63.9 CEREBROVASCULAR ACCIDENT (CVA), UNSPECIFIED MECHANISM (HCC): ICD-10-CM

## 2019-08-06 ENCOUNTER — ANTICOAGULATION MONITORING (OUTPATIENT)
Dept: VASCULAR LAB | Facility: MEDICAL CENTER | Age: 84
End: 2019-08-06

## 2019-08-06 ENCOUNTER — NON-PROVIDER VISIT (OUTPATIENT)
Dept: MEDICAL GROUP | Facility: PHYSICIAN GROUP | Age: 84
End: 2019-08-06
Payer: MEDICARE

## 2019-08-06 VITALS
HEART RATE: 62 BPM | OXYGEN SATURATION: 97 % | DIASTOLIC BLOOD PRESSURE: 70 MMHG | SYSTOLIC BLOOD PRESSURE: 118 MMHG | RESPIRATION RATE: 12 BRPM

## 2019-08-06 DIAGNOSIS — Z79.01 CHRONIC ANTICOAGULATION: ICD-10-CM

## 2019-08-06 DIAGNOSIS — Z95.2 HISTORY OF HEART VALVE REPLACEMENT WITH MECHANICAL VALVE: ICD-10-CM

## 2019-08-06 LAB
INR BLD: 1.4 (ref 0.9–1.2)
INR PPP: 1.4 (ref 2–3.5)
POC PROTIME: ABNORMAL

## 2019-08-06 PROCEDURE — 85610 PROTHROMBIN TIME: CPT | Performed by: FAMILY MEDICINE

## 2019-08-06 NOTE — PROGRESS NOTES
OP Anticoagulation Service Note    THIS VISIT CONDUCTED WITH [employer] UTILIZING SECURE AND ENCRYPTED VIDEOCONFERENCING EQUIPMENT  ROS:      Anticoagulation Summary  As of 2019    INR goal:   2.5-3.5   TTR:   42.5 % (4 y)   INR used for dosin.40! (2019)   Warfarin maintenance plan:   7.5 mg (5 mg x 1.5) every Tue; 5 mg (5 mg x 1) all other days   Weekly warfarin total:   37.5 mg   Plan last modified:   CORAL Gaviria (2019)   Next INR check:   2019   Target end date:   Indefinite    Indications    CVA (cerebral vascular accident) (HCC) [I63.9]  History of heart valve replacement with mechanical valve [V43.3] [Z95.2]             Anticoagulation Episode Summary     INR check location:       Preferred lab:       Send INR reminders to:       Comments:   [facility]      Anticoagulation Care Providers     Provider Role Specialty Phone number    Yuridia Escalante, PharmD Responsible          Anticoagulation Patient Findings           HPI:  Adrienne Mejia, on anticoagulation therapy with warfarin for CVA and mechanical valve.   Changes to current medical/health status since last appt: none  Denies signs/symptoms of bleeding and/or thrombosis since the last appt.    Denies any interval changes to diet  Denies any interval changes to medications since last appt.   Denies any complications or cost restrictions with current therapy.   Denies any missed doses.     A/P   INR  SUB-therapeutic.   Unknown Cause  Unknown renal function, but with only two doses of enoxaparin, unlikely enoxaparin will accumulate much if she does have any dysfunction.    Next INR on 19    Extremities: No redness, or edema.     Medication: Warfarin (Coumadin)    19: Take 10 mg of warfarin and one dose of enoxaparin 100 mg (subcutaneously)  19: Take 10 mg of warfarin and one dose of enoxaparin 100 mg (subcutaneously)  19: Test INR again at the lab.    Review all of your home  medications and newly ordered medications with your doctor and / or pharmacist. Follow medication instructions as directed by your doctor and / or pharmacist. Please keep your medication list with you and share with your physician. Update the information when medications are discontinued, doses are changed, or new medications (including over-the-counter products) are added; and carry medication information at all times in the event of emergency situations.      For questions, please contact Outpatient Anticoagulation Service [phone].     Robi Bolaños, CaterinaD

## 2019-08-08 ENCOUNTER — HOSPITAL ENCOUNTER (OUTPATIENT)
Dept: LAB | Facility: MEDICAL CENTER | Age: 84
End: 2019-08-08
Attending: NURSE PRACTITIONER
Payer: MEDICARE

## 2019-08-08 DIAGNOSIS — Z95.2 HISTORY OF HEART VALVE REPLACEMENT WITH MECHANICAL VALVE: ICD-10-CM

## 2019-08-08 LAB
INR PPP: 2.32 (ref 0.87–1.13)
PROTHROMBIN TIME: 26.3 SEC (ref 12–14.6)

## 2019-08-08 PROCEDURE — 36415 COLL VENOUS BLD VENIPUNCTURE: CPT

## 2019-08-08 PROCEDURE — 85610 PROTHROMBIN TIME: CPT

## 2019-08-09 ENCOUNTER — ANTICOAGULATION MONITORING (OUTPATIENT)
Dept: VASCULAR LAB | Facility: MEDICAL CENTER | Age: 84
End: 2019-08-09

## 2019-08-09 DIAGNOSIS — Z95.2 HISTORY OF HEART VALVE REPLACEMENT WITH MECHANICAL VALVE: ICD-10-CM

## 2019-08-09 NOTE — PROGRESS NOTES
Anticoagulation Summary  As of 2019    INR goal:   2.5-3.5   TTR:   42.5 % (4.1 y)   INR used for dosin.32! (2019)   Warfarin maintenance plan:   7.5 mg (5 mg x 1.5) every Tue; 5 mg (5 mg x 1) all other days   Weekly warfarin total:   37.5 mg   Plan last modified:   CORAL Gaviria (2019)   Next INR check:   2019   Target end date:   Indefinite    Indications    CVA (cerebral vascular accident) (HCC) [I63.9]  History of heart valve replacement with mechanical valve [V43.3] [Z95.2]             Anticoagulation Episode Summary     INR check location:       Preferred lab:       Send INR reminders to:       Comments:   [place] [facility]      Anticoagulation Care Providers     Provider Role Specialty Phone number    Yuridia Escalante, PharmD Responsible          Anticoagulation Patient Findings    Left a message on the patient's voicemail today, informing the patient of a SUB-therapeutic INR of 2.32.  Instructed patient to call the clinic with any changes to diet or medications, with any signs/sx of bleeding or clotting or with any questions or concerns.     Patient instructed to bolus with 7.5mg TONIGHT ONLY, as a one time boost, then to resume her current regimen. Patient asked to call the clinic back to schedule follow up appt with telemed in [place] for 2 weeks.    David DiggsD

## 2019-08-20 ENCOUNTER — APPOINTMENT (OUTPATIENT)
Dept: MEDICAL GROUP | Facility: PHYSICIAN GROUP | Age: 84
End: 2019-08-20
Payer: MEDICARE

## 2019-08-20 ENCOUNTER — ANTICOAGULATION MONITORING (OUTPATIENT)
Dept: VASCULAR LAB | Facility: MEDICAL CENTER | Age: 84
End: 2019-08-20
Payer: MEDICARE

## 2019-08-20 ENCOUNTER — NON-PROVIDER VISIT (OUTPATIENT)
Dept: MEDICAL GROUP | Facility: PHYSICIAN GROUP | Age: 84
End: 2019-08-20
Payer: MEDICARE

## 2019-08-20 VITALS
OXYGEN SATURATION: 95 % | DIASTOLIC BLOOD PRESSURE: 78 MMHG | RESPIRATION RATE: 12 BRPM | HEART RATE: 78 BPM | SYSTOLIC BLOOD PRESSURE: 126 MMHG

## 2019-08-20 DIAGNOSIS — I63.429 CEREBROVASCULAR ACCIDENT (CVA) DUE TO EMBOLISM OF ANTERIOR CEREBRAL ARTERY, UNSPECIFIED BLOOD VESSEL LATERALITY (HCC): ICD-10-CM

## 2019-08-20 DIAGNOSIS — Z79.01 CHRONIC ANTICOAGULATION: ICD-10-CM

## 2019-08-20 DIAGNOSIS — Z95.2 HISTORY OF HEART VALVE REPLACEMENT WITH MECHANICAL VALVE: ICD-10-CM

## 2019-08-20 LAB
INR BLD: 3.5 (ref 0.9–1.2)
INR PPP: 3.5 (ref 2–3.5)
POC PROTIME: ABNORMAL

## 2019-08-20 PROCEDURE — 99212 OFFICE O/P EST SF 10 MIN: CPT | Performed by: NURSE PRACTITIONER

## 2019-08-20 PROCEDURE — 85610 PROTHROMBIN TIME: CPT | Performed by: FAMILY MEDICINE

## 2019-08-20 NOTE — PROGRESS NOTES
OP Anticoagulation Service Note    Date: 8/20/2019  Blood Pressure : 126/78  Pulse: 78  Respiration: 12    Anticoagulation Summary  As of 8/20/2019    INR goal:   2.5-3.5   TTR:   42.8 % (4.1 y)   INR used for dosing:   3.50 (8/20/2019)   Warfarin maintenance plan:   7.5 mg (5 mg x 1.5) every Tue; 5 mg (5 mg x 1) all other days   Weekly warfarin total:   37.5 mg   Plan last modified:   CORAL Gaviria (4/23/2019)   Next INR check:   9/3/2019   Target end date:   Indefinite    Indications    CVA (cerebral vascular accident) (HCC) [I63.9]  History of heart valve replacement with mechanical valve [V43.3] [Z95.2]             Anticoagulation Episode Summary     INR check location:       Preferred lab:       Send INR reminders to:       Comments:   [facility]      Anticoagulation Care Providers     Provider Role Specialty Phone number    Yuridia Escalante, PharmD Responsible          Anticoagulation Patient Findings      THIS VISIT CONDUCTED WITH [employer] UTILIZING SECURE AND ENCRYPTED VIDEOCONFERENCING EQUIPMENT  ROS:    Pulm: Denies SOB, chest pain.    Card: Denies syncope, edema, palpitations.    Extremities: Denies redness, pain.     PE:    Pulm: No SOB, even and unlabored.    Card: Normal rate and rhythm.    Extremities: No redness, or edema.     INR  Is -therapeutic.    Denies signs/symptoms of bleeding and/or thrombosis.    Denies changes to diet or medications.   Follow up appt in 2 weeks     Plan:  Continue current medication regimen.     Medication: Warfarin (Coumadin)     Sunday Monday Tuesday Wednesday Thursday Friday Saturday      5 mg    5 mg    7.5 mg    5 mg    5 mg    5 mg    5 mg      1 tab(s)    1 tab(s)    1.5 tab(s)    1 tab(s)    1 tab(s)    1 tab(s)  1 tab(s)     Next Appointment: Tuesday, Sept 3 @ 2:30    Review all of your home medications and newly ordered medications with your doctor and / or pharmacist. Follow medication instructions as directed by  your doctor and / or pharmacist. Please keep your medication list with you and share with your physician. Update the information when medications are discontinued, doses are changed, or new medications (including over-the-counter products) are added; and carry medication information at all times in the event of emergency situations.      For questions, please contact Outpatient Anticoagulation Service [phone].      Patient is on a high risk medication and therefore requires close monitoring and follow up.     CHEST guidelines recommend frequent INR monitoring at regular intervals (a few days up to a max of 12 weeks) to ensure they are on the proper dose of warfarin and not having any complications from therapy.  INRs can dramatically change over a short time period due to diet, medications, and medical conditions.   The patient instructed to go to the ER for falls with a head injury,  blood in urine or stool or any bleeding that last longer than 20 min.     MARY Gaviria.

## 2019-09-03 ENCOUNTER — ANTICOAGULATION MONITORING (OUTPATIENT)
Dept: VASCULAR LAB | Facility: MEDICAL CENTER | Age: 84
End: 2019-09-03
Payer: MEDICARE

## 2019-09-03 ENCOUNTER — NON-PROVIDER VISIT (OUTPATIENT)
Dept: MEDICAL GROUP | Facility: PHYSICIAN GROUP | Age: 84
End: 2019-09-03
Payer: MEDICARE

## 2019-09-03 ENCOUNTER — TELEMEDICINE2 (OUTPATIENT)
Dept: MEDICAL GROUP | Facility: PHYSICIAN GROUP | Age: 84
End: 2019-09-03
Payer: MEDICARE

## 2019-09-03 VITALS
HEART RATE: 69 BPM | RESPIRATION RATE: 12 BRPM | SYSTOLIC BLOOD PRESSURE: 118 MMHG | DIASTOLIC BLOOD PRESSURE: 58 MMHG | OXYGEN SATURATION: 97 %

## 2019-09-03 DIAGNOSIS — Z79.01 CHRONIC ANTICOAGULATION: ICD-10-CM

## 2019-09-03 DIAGNOSIS — Z95.2 HISTORY OF HEART VALVE REPLACEMENT WITH MECHANICAL VALVE: ICD-10-CM

## 2019-09-03 DIAGNOSIS — I63.429 CEREBROVASCULAR ACCIDENT (CVA) DUE TO EMBOLISM OF ANTERIOR CEREBRAL ARTERY, UNSPECIFIED BLOOD VESSEL LATERALITY (HCC): ICD-10-CM

## 2019-09-03 LAB
INR BLD: 3.5 (ref 0.9–1.2)
INR PPP: 3.5 (ref 2–3.5)
POC PROTIME: ABNORMAL

## 2019-09-03 PROCEDURE — 85610 PROTHROMBIN TIME: CPT | Performed by: PHYSICIAN ASSISTANT

## 2019-09-03 PROCEDURE — 99212 OFFICE O/P EST SF 10 MIN: CPT | Performed by: NURSE PRACTITIONER

## 2019-09-03 NOTE — PROGRESS NOTES
OP Anticoagulation Service Note    Date: 9/3/2019  Blood Pressure : 118/58  Pulse: 69  Respiration: 12    Anticoagulation Summary  As of 9/3/2019    INR goal:   2.5-3.5   TTR:   43.3 % (4.1 y)   INR used for dosing:   3.50 (9/3/2019)   Warfarin maintenance plan:   5 mg (5 mg x 1) every day   Weekly warfarin total:   35 mg   Plan last modified:   CORAL Gaviria (9/3/2019)   Next INR check:   9/17/2019   Target end date:   Indefinite    Indications    CVA (cerebral vascular accident) ([facility]) [I63.9]  History of heart valve replacement with mechanical valve [V43.3] [Z95.2]             Anticoagulation Episode Summary     INR check location:       Preferred lab:       Send INR reminders to:       Comments:   [facility]      Anticoagulation Care Providers     Provider Role Specialty Phone number    Yuridia KIRSTEN Escalante, PharmD Responsible          Anticoagulation Patient Findings      THIS VISIT CONDUCTED WITH [employer] TE[employer] UTILIZING SECURE AND ENCRYPTED VIDEOCONFERENCING EQUIPMENT  ROS:    Pulm: Denies SOB, chest pain.    Card: Denies syncope, edema, palpitations.    Extremities: Denies redness, pain.     PE:    Pulm: No SOB, even and unlabored.    Card: Normal rate and rhythm.    Extremities: No redness, or edema.     INR  is-therapeutic.    Denies signs/symptoms of bleeding and/or thrombosis.    Denies changes to diet or medications.   Follow up appt in 2 weeks     Plan:  Decrease dose slightly    Medication: Warfarin (Coumadin)     Sunday Monday Tuesday Wednesday Thursday Friday Saturday      5 mg    5 mg    5 mg    5 mg    5 mg    5 mg    5 mg      1 tab(s)    1 tab(s)    1 tab(s)    1 tab(s)    1 tab(s)    1 tab(s)  1 tab(s)     Next Appointment: Tuesday, Sept 19 @ 2:45      Review all of your home medications and newly ordered medications with your doctor and / or pharmacist. Follow medication instructions as directed by your doctor and / or pharmacist. Please keep your  medication list with you and share with your physician. Update the information when medications are discontinued, doses are changed, or new medications (including over-the-counter products) are added; and carry medication information at all times in the event of emergency situations.      For questions, please contact Outpatient Anticoagulation Service [phone].        Patient is on a high risk medication and therefore requires close monitoring and follow up.     CHEST guidelines recommend frequent INR monitoring at regular intervals (a few days up to a max of 12 weeks) to ensure they are on the proper dose of warfarin and not having any complications from therapy.  INRs can dramatically change over a short time period due to diet, medications, and medical conditions.   The patient instructed to go to the ER for falls with a head injury,  blood in urine or stool or any bleeding that last longer than 20 min.     MARY Gaviria.

## 2019-09-15 DIAGNOSIS — I63.00 CEREBROVASCULAR ACCIDENT (CVA) DUE TO THROMBOSIS OF PRECEREBRAL ARTERY (HCC): ICD-10-CM

## 2019-09-16 RX ORDER — WARFARIN SODIUM 5 MG/1
TABLET ORAL
Qty: 90 TAB | Refills: 1 | Status: SHIPPED | OUTPATIENT
Start: 2019-09-16

## 2019-09-17 ENCOUNTER — ANTICOAGULATION MONITORING (OUTPATIENT)
Dept: VASCULAR LAB | Facility: MEDICAL CENTER | Age: 84
End: 2019-09-17

## 2019-09-17 ENCOUNTER — APPOINTMENT (OUTPATIENT)
Dept: MEDICAL GROUP | Facility: PHYSICIAN GROUP | Age: 84
End: 2019-09-17
Payer: MEDICARE

## 2019-09-17 ENCOUNTER — NON-PROVIDER VISIT (OUTPATIENT)
Dept: MEDICAL GROUP | Facility: PHYSICIAN GROUP | Age: 84
End: 2019-09-17
Payer: MEDICARE

## 2019-09-17 VITALS
SYSTOLIC BLOOD PRESSURE: 120 MMHG | OXYGEN SATURATION: 96 % | DIASTOLIC BLOOD PRESSURE: 76 MMHG | HEART RATE: 75 BPM | RESPIRATION RATE: 14 BRPM

## 2019-09-17 DIAGNOSIS — Z79.01 ALTERATION IN ANTICOAGULATION: ICD-10-CM

## 2019-09-17 DIAGNOSIS — Z95.2 HISTORY OF HEART VALVE REPLACEMENT WITH MECHANICAL VALVE: ICD-10-CM

## 2019-09-17 DIAGNOSIS — Z51.81 ALTERATION IN ANTICOAGULATION: ICD-10-CM

## 2019-09-17 LAB
INR BLD: 2 (ref 0.9–1.2)
INR PPP: 2 (ref 2–3.5)
POC PROTIME: ABNORMAL

## 2019-09-17 PROCEDURE — 85610 PROTHROMBIN TIME: CPT | Performed by: INTERNAL MEDICINE

## 2019-09-17 NOTE — PROGRESS NOTES
OP Anticoagulation Service Note    THIS VISIT CONDUCTED WITH [employer] UTILIZING SECURE AND ENCRYPTED VIDEOCONFERENCING EQUIPMENT  ROS:      Anticoagulation Summary  As of 2019    INR goal:   2.5-3.5   TTR:   43.5 % (4.2 y)   INR used for dosin.00! (2019)   Warfarin maintenance plan:   5 mg (5 mg x 1) every day   Weekly warfarin total:   35 mg   Plan last modified:   CORAL Gaviria (9/3/2019)   Next INR check:   10/1/2019   Target end date:   Indefinite    Indications    CVA (cerebral vascular accident) (HCC) [I63.9]  History of heart valve replacement with mechanical valve [V43.3] [Z95.2]             Anticoagulation Episode Summary     INR check location:       Preferred lab:       Send INR reminders to:       Comments:   [facility]      Anticoagulation Care Providers     Provider Role Specialty Phone number    Yuridia Escalante, PharmD Responsible          Anticoagulation Patient Findings        HPI:  Adrienne Mejia, on anticoagulation therapy with warfarin for CVA  Changes to current medical/health status since last appt: none  Denies signs/symptoms of bleeding and/or thrombosis since the last appt.    Denies any interval changes to diet  Denies any interval changes to medications since last appt.   Denies any complications or cost restrictions with current therapy.     A/P   INR  SUBP-therapeutic.     She had to take a reduced dose yesterday because she ran out of warfarin.  This is likely the cause of the decreased INR.      Please take 1.5 (7.5 mg) tablets today 19, then begin the following table:    Medication: Warfarin (Coumadin)           5 mg    5 mg    5 mg    5 mg    5 mg    5 mg    5 mg      1 tab(s)    1 tab(s)    1 tab(s)    1 tab(s)    1 tab(s)    1 tab(s)  1 tab(s)     Next Appointment:  10/1/19 at 3:30 pm     Review all of your home medications and newly ordered medications  with your doctor and / or pharmacist. Follow medication instructions as directed by your doctor and / or pharmacist. Please keep your medication list with you and share with your physician. Update the information when medications are discontinued, doses are changed, or new medications (including over-the-counter products) are added; and carry medication information at all times in the event of emergency situations.      For questions, please contact Outpatient Anticoagulation Service [phone].     Caterina BarrowD

## 2019-10-01 ENCOUNTER — ANTICOAGULATION MONITORING (OUTPATIENT)
Dept: VASCULAR LAB | Facility: MEDICAL CENTER | Age: 84
End: 2019-10-01
Payer: MEDICARE

## 2019-10-01 ENCOUNTER — NON-PROVIDER VISIT (OUTPATIENT)
Dept: MEDICAL GROUP | Facility: PHYSICIAN GROUP | Age: 84
End: 2019-10-01
Payer: MEDICARE

## 2019-10-01 ENCOUNTER — APPOINTMENT (OUTPATIENT)
Dept: MEDICAL GROUP | Facility: PHYSICIAN GROUP | Age: 84
End: 2019-10-01
Payer: MEDICARE

## 2019-10-01 VITALS
DIASTOLIC BLOOD PRESSURE: 74 MMHG | RESPIRATION RATE: 12 BRPM | HEART RATE: 65 BPM | SYSTOLIC BLOOD PRESSURE: 128 MMHG | OXYGEN SATURATION: 97 %

## 2019-10-01 DIAGNOSIS — Z79.01 CHRONIC ANTICOAGULATION: Primary | ICD-10-CM

## 2019-10-01 DIAGNOSIS — I63.9 CEREBROVASCULAR ACCIDENT (CVA), UNSPECIFIED MECHANISM (HCC): ICD-10-CM

## 2019-10-01 DIAGNOSIS — Z95.2 HISTORY OF HEART VALVE REPLACEMENT WITH MECHANICAL VALVE: ICD-10-CM

## 2019-10-01 LAB
INR BLD: 2 (ref 0.9–1.2)
INR PPP: 2 (ref 2–3.5)
POC PROTIME: ABNORMAL

## 2019-10-01 PROCEDURE — 99212 OFFICE O/P EST SF 10 MIN: CPT | Performed by: NURSE PRACTITIONER

## 2019-10-01 PROCEDURE — 85610 PROTHROMBIN TIME: CPT | Performed by: FAMILY MEDICINE

## 2019-10-01 NOTE — PROGRESS NOTES
OP Anticoagulation Service Note    Date: 10/1/2019  Blood Pressure : 128/74  Pulse: 65  Respiration: 12    Anticoagulation Summary  As of 10/1/2019    INR goal:   2.5-3.5   TTR:   43.1 % (4.2 y)   INR used for dosin.00! (10/1/2019)   Warfarin maintenance plan:   5 mg (5 mg x 1) every day   Weekly warfarin total:   35 mg   Plan last modified:   CORAL Gaviria (9/3/2019)   Next INR check:   10/15/2019   Target end date:   Indefinite    Indications    CVA (cerebral vascular accident) (HCC) [I63.9]  History of heart valve replacement with mechanical valve [V43.3] [Z95.2]             Anticoagulation Episode Summary     INR check location:       Preferred lab:       Send INR reminders to:       Comments:   [facility]      Anticoagulation Care Providers     Provider Role Specialty Phone number    Yuridia KIRSTEN Escalante, PharmD Responsible          Anticoagulation Patient Findings      THIS VISIT CONDUCTED WITH [employer] TE[employer] UTILIZING SECURE AND ENCRYPTED VIDEOCONFERENCING EQUIPMENT  ROS:    Pulm: Denies SOB, chest pain.    Card: Denies syncope, edema, palpitations.    Extremities: Denies redness, pain.     PE:    Pulm: No SOB, even and unlabored.    Card: Normal rate and rhythm.    Extremities: No redness, or edema.     INR  is-therapeutic.    Denies signs/symptoms of bleeding and/or thrombosis.    Denies changes to diet or medications.   Follow up appt in 2 weeks     Plan:  Continue current medication regimen.     Medication: Warfarin (Coumadin)           5 mg    5 mg    5 mg    5 mg    5 mg    5 mg    5 mg      1 tab(s)    1 tab(s)    1 tab(s)    1 tab(s)    1 tab(s)    1 tab(s)  1 tab(s)     Next Appointment: Tuesday, Oct 15 @ 3:30      Review all of your home medications and newly ordered medications with your doctor and / or pharmacist. Follow medication instructions as directed by your doctor and / or pharmacist. Please  keep your medication list with you and share with your physician. Update the information when medications are discontinued, doses are changed, or new medications (including over-the-counter products) are added; and carry medication information at all times in the event of emergency situations.      For questions, please contact Outpatient Anticoagulation Service [phone].      Patient is on a high risk medication and therefore requires close monitoring and follow up.     CHEST guidelines recommend frequent INR monitoring at regular intervals (a few days up to a max of 12 weeks) to ensure they are on the proper dose of warfarin and not having any complications from therapy.  INRs can dramatically change over a short time period due to diet, medications, and medical conditions.   The patient instructed to go to the ER for falls with a head injury,  blood in urine or stool or any bleeding that last longer than 20 min.     MARY Gaviria.

## 2019-10-15 ENCOUNTER — APPOINTMENT (OUTPATIENT)
Dept: MEDICAL GROUP | Facility: PHYSICIAN GROUP | Age: 84
End: 2019-10-15
Payer: MEDICARE

## 2019-11-05 ENCOUNTER — ANTICOAGULATION MONITORING (OUTPATIENT)
Dept: VASCULAR LAB | Facility: MEDICAL CENTER | Age: 84
End: 2019-11-05
Payer: MEDICARE

## 2019-11-05 ENCOUNTER — APPOINTMENT (OUTPATIENT)
Dept: MEDICAL GROUP | Facility: PHYSICIAN GROUP | Age: 84
End: 2019-11-05
Payer: MEDICARE

## 2019-11-05 ENCOUNTER — NON-PROVIDER VISIT (OUTPATIENT)
Dept: MEDICAL GROUP | Facility: PHYSICIAN GROUP | Age: 84
End: 2019-11-05
Payer: MEDICARE

## 2019-11-05 VITALS
SYSTOLIC BLOOD PRESSURE: 115 MMHG | HEART RATE: 70 BPM | DIASTOLIC BLOOD PRESSURE: 60 MMHG | OXYGEN SATURATION: 96 % | RESPIRATION RATE: 12 BRPM

## 2019-11-05 DIAGNOSIS — Z95.2 HISTORY OF HEART VALVE REPLACEMENT WITH MECHANICAL VALVE: ICD-10-CM

## 2019-11-05 DIAGNOSIS — I63.02 CEREBROVASCULAR ACCIDENT (CVA) DUE TO THROMBOSIS OF BASILAR ARTERY (HCC): ICD-10-CM

## 2019-11-05 DIAGNOSIS — Z79.01 CHRONIC ANTICOAGULATION: ICD-10-CM

## 2019-11-05 LAB
INR BLD: 2.3 (ref 0.9–1.2)
INR PPP: 2.3 (ref 2–3.5)
POC PROTIME: ABNORMAL

## 2019-11-05 PROCEDURE — 85610 PROTHROMBIN TIME: CPT | Performed by: FAMILY MEDICINE

## 2019-11-05 PROCEDURE — 99212 OFFICE O/P EST SF 10 MIN: CPT | Performed by: NURSE PRACTITIONER

## 2019-11-05 NOTE — PROGRESS NOTES
OP Anticoagulation Service Note    Date: 2019  Blood Pressure : 115/60  Pulse: 70  Respiration: 12    Anticoagulation Summary  As of 2019    INR goal:   2.5-3.5   TTR:   42.2 % (4.3 y)   INR used for dosin.30! (2019)   Warfarin maintenance plan:   5 mg (5 mg x 1) every day   Weekly warfarin total:   35 mg   Plan last modified:   CORAL Gaviria (9/3/2019)   Next INR check:   2019   Target end date:   Indefinite    Indications    CVA (cerebral vascular accident) (HCC) [I63.9]  History of heart valve replacement with mechanical valve [V43.3] [Z95.2]             Anticoagulation Episode Summary     INR check location:       Preferred lab:       Send INR reminders to:       Comments:   [facility]      Anticoagulation Care Providers     Provider Role Specialty Phone number    Yuridia KIRSTEN Escalante, PharmD Responsible          Anticoagulation Patient Findings      THIS VISIT CONDUCTED WITH [employer] TELE[employer] UTILIZING SECURE AND ENCRYPTED VIDEOCONFERENCING EQUIPMENT  ROS:    Pulm: Denies SOB, chest pain.    Card: Denies syncope, edema, palpitations.    Extremities: Denies redness, pain.     PE:    Pulm: No SOB, even and unlabored.    Card: Normal rate and rhythm.    Extremities: No redness, or edema.     INR  sub-therapeutic.    Denies signs/symptoms of bleeding and/or thrombosis.    Denies changes to diet or medications.   Follow up appt in 2 weeks     Plan:  Increase dose see below     Medication: Warfarin (Coumadin)           5 mg    5 mg    7.5 mg    5 mg    5 mg    5 mg    5 mg      1 tab(s)    1 tab(s)    1.5 tab(s)    1 tab(s)    1 tab(s)    1 tab(s)  1 tab(s)     Next Appointment: , @  3:00    Review all of your home medications and newly ordered medications with your doctor and / or pharmacist. Follow medication instructions as directed by your doctor and / or pharmacist. Please keep  your medication list with you and share with your physician. Update the information when medications are discontinued, doses are changed, or new medications (including over-the-counter products) are added; and carry medication information at all times in the event of emergency situations.      For questions, please contact Outpatient Anticoagulation Service [phone].   The patient is on a high risk medication and is sub- therapeutic. This could lead to clot formation or risk of stroke. Therefore this patient requires close monitoring and follow up.      CHEST guidelines recommend frequent INR monitoring at regular intervals (a few days up to a max of 12 weeks) to ensure they are on the proper dose of warfarin and not having any complications from therapy.  INRs can dramatically change over a short time period due to diet, medications, and medical conditions.   The patient instructed to go to the ER for falls with a head injury,  blood in urine or stool or any bleeding that last longer than 20 min.     MARY Gaviria.                sub

## 2019-11-19 ENCOUNTER — APPOINTMENT (OUTPATIENT)
Dept: MEDICAL GROUP | Facility: PHYSICIAN GROUP | Age: 84
End: 2019-11-19
Payer: MEDICARE

## 2019-11-19 ENCOUNTER — NON-PROVIDER VISIT (OUTPATIENT)
Dept: MEDICAL GROUP | Facility: PHYSICIAN GROUP | Age: 84
End: 2019-11-19
Payer: MEDICARE

## 2019-11-19 ENCOUNTER — ANTICOAGULATION MONITORING (OUTPATIENT)
Dept: VASCULAR LAB | Facility: MEDICAL CENTER | Age: 84
End: 2019-11-19
Payer: MEDICARE

## 2019-11-19 VITALS
SYSTOLIC BLOOD PRESSURE: 126 MMHG | DIASTOLIC BLOOD PRESSURE: 68 MMHG | HEART RATE: 79 BPM | OXYGEN SATURATION: 96 % | RESPIRATION RATE: 12 BRPM

## 2019-11-19 DIAGNOSIS — Z79.01 CHRONIC ANTICOAGULATION: ICD-10-CM

## 2019-11-19 DIAGNOSIS — I63.019 CEREBROVASCULAR ACCIDENT (CVA) DUE TO THROMBOSIS OF VERTEBRAL ARTERY, UNSPECIFIED BLOOD VESSEL LATERALITY (HCC): ICD-10-CM

## 2019-11-19 DIAGNOSIS — Z79.01 ALTERATION IN ANTICOAGULATION: ICD-10-CM

## 2019-11-19 DIAGNOSIS — Z95.2 HISTORY OF HEART VALVE REPLACEMENT WITH MECHANICAL VALVE: ICD-10-CM

## 2019-11-19 DIAGNOSIS — Z51.81 ALTERATION IN ANTICOAGULATION: ICD-10-CM

## 2019-11-19 LAB
INR BLD: 3.3 (ref 0.9–1.2)
INR PPP: 2.3 (ref 2–3.5)
INR PPP: 3.3 (ref 2–3.5)
INR PPP: 3.5 (ref 2–3.5)
POC PROTIME: ABNORMAL

## 2019-11-19 PROCEDURE — 85610 PROTHROMBIN TIME: CPT | Performed by: FAMILY MEDICINE

## 2019-11-19 PROCEDURE — 99212 OFFICE O/P EST SF 10 MIN: CPT | Performed by: NURSE PRACTITIONER

## 2019-11-19 NOTE — PROGRESS NOTES
OP Anticoagulation Service Note    Date: 11/19/2019  Blood Pressure : 126/68  Pulse: 79  Respiration: 12    Anticoagulation Summary  As of 11/19/2019    INR goal:   2.5-3.5   TTR:   41.8 % (4.3 y)   INR used for dosing:   3.30 (11/19/2019)   Warfarin maintenance plan:   7.5 mg (5 mg x 1.5) every Tue; 5 mg (5 mg x 1) all other days   Weekly warfarin total:   37.5 mg   Plan last modified:   CORAL Gaviria (11/5/2019)   Next INR check:   12/3/2019   Target end date:   Indefinite    Indications    CVA (cerebral vascular accident) (HCC) [I63.9]  History of heart valve replacement with mechanical valve [V43.3] [Z95.2]             Anticoagulation Episode Summary     INR check location:       Preferred lab:       Send INR reminders to:       Comments:   [place] tele[facility]      Anticoagulation Care Providers     Provider Role Specialty Phone number    Yuridia Escalante, PharmD Responsible          Anticoagulation Patient Findings      THIS VISIT CONDUCTED WITH [employer] TELE[employer]IC[employer] UTILIZING SECURE AND ENCRYPTED VIDEOCONFERENCING EQUIPMENT  ROS:    Pulm: Denies SOB, chest pain.    Card: Denies syncope, edema, palpitations.    Extremities: Denies redness, pain.     PE:    Pulm: No SOB, even and unlabored.    Card: Normal rate and rhythm.    Extremities: No redness, or edema.     INR  is-therapeutic.    Denies signs/symptoms of bleeding and/or thrombosis.    Denies changes to diet or medications.   Follow up appt in 2 weeks     Plan:  Continue current medication regimen.     Medication: Warfarin (Coumadin)     Sunday Monday Tuesday Wednesday Thursday Friday Saturday      5 mg    5 mg    7.5 mg    5 mg    5 mg    5 mg    5 mg      1 tab(s)    1 tab(s)    1.5 tab(s)    1 tab(s)    1 tab(s)    1 tab(s)  1 tab(s)     Next Appointment: Tuesday, Dec 3 @3:15      Review all of your home medications and newly ordered medications with your doctor and / or pharmacist. Follow medication instructions as directed  by your doctor and / or pharmacist. Please keep your medication list with you and share with your physician. Update the information when medications are discontinued, doses are changed, or new medications (including over-the-counter products) are added; and carry medication information at all times in the event of emergency situations.      For questions, please contact Outpatient Anticoagulation Service [phone].         Patient is on a high risk medication and therefore requires close monitoring and follow up.     CHEST guidelines recommend frequent INR monitoring at regular intervals (a few days up to a max of 12 weeks) to ensure they are on the proper dose of warfarin and not having any complications from therapy.  INRs can dramatically change over a short time period due to diet, medications, and medical conditions.   The patient instructed to go to the ER for falls with a head injury,  blood in urine or stool or any bleeding that last longer than 20 min.     MARY Gaviria.

## 2019-12-03 ENCOUNTER — ANTICOAGULATION MONITORING (OUTPATIENT)
Dept: VASCULAR LAB | Facility: MEDICAL CENTER | Age: 84
End: 2019-12-03

## 2019-12-03 DIAGNOSIS — Z95.2 HISTORY OF HEART VALVE REPLACEMENT WITH MECHANICAL VALVE: ICD-10-CM

## 2019-12-03 DIAGNOSIS — I63.019 CEREBROVASCULAR ACCIDENT (CVA) DUE TO THROMBOSIS OF VERTEBRAL ARTERY, UNSPECIFIED BLOOD VESSEL LATERALITY (HCC): ICD-10-CM

## 2019-12-10 ENCOUNTER — APPOINTMENT (OUTPATIENT)
Dept: VASCULAR LAB | Facility: MEDICAL CENTER | Age: 84
End: 2019-12-10
Payer: MEDICARE

## 2019-12-10 ENCOUNTER — ANTICOAGULATION MONITORING (OUTPATIENT)
Dept: VASCULAR LAB | Facility: MEDICAL CENTER | Age: 84
End: 2019-12-10
Payer: MEDICARE

## 2019-12-10 ENCOUNTER — NON-PROVIDER VISIT (OUTPATIENT)
Dept: MEDICAL GROUP | Facility: PHYSICIAN GROUP | Age: 84
End: 2019-12-10
Payer: MEDICARE

## 2019-12-10 VITALS
SYSTOLIC BLOOD PRESSURE: 128 MMHG | OXYGEN SATURATION: 95 % | RESPIRATION RATE: 12 BRPM | HEART RATE: 76 BPM | DIASTOLIC BLOOD PRESSURE: 70 MMHG

## 2019-12-10 DIAGNOSIS — Z95.2 HISTORY OF HEART VALVE REPLACEMENT WITH MECHANICAL VALVE: ICD-10-CM

## 2019-12-10 DIAGNOSIS — I63.019 CEREBROVASCULAR ACCIDENT (CVA) DUE TO THROMBOSIS OF VERTEBRAL ARTERY, UNSPECIFIED BLOOD VESSEL LATERALITY (HCC): ICD-10-CM

## 2019-12-10 DIAGNOSIS — Z79.01 CHRONIC ANTICOAGULATION: ICD-10-CM

## 2019-12-10 LAB
INR BLD: 2.5 (ref 0.9–1.2)
INR PPP: 2.5 (ref 2–3.5)
POC PROTIME: ABNORMAL

## 2019-12-10 PROCEDURE — 85610 PROTHROMBIN TIME: CPT | Performed by: FAMILY MEDICINE

## 2019-12-10 PROCEDURE — 99212 OFFICE O/P EST SF 10 MIN: CPT | Performed by: NURSE PRACTITIONER

## 2019-12-10 NOTE — PROGRESS NOTES
OP Anticoagulation Service Note    Date: 12/10/2019  Blood Pressure : 128/70  Pulse: 76  Respiration: 12    Anticoagulation Summary  As of 12/10/2019    INR goal:   2.5-3.5   TTR:   41.2 % (4.4 y)   INR used for dosin.50 (12/10/2019)   Warfarin maintenance plan:   7.5 mg (5 mg x 1.5) every Tue; 5 mg (5 mg x 1) all other days   Weekly warfarin total:   37.5 mg   Plan last modified:   CORAL Gaviria (2019)   Next INR check:   2020   Target end date:   Indefinite    Indications    CVA (cerebral vascular accident) (HCC) [I63.9]  History of heart valve replacement with mechanical valve [V43.3] [Z95.2]             Anticoagulation Episode Summary     INR check location:       Preferred lab:       Send INR reminders to:       Comments:   [facility]      Anticoagulation Care Providers     Provider Role Specialty Phone number    Yuridia Escalante, PharmD Responsible          Anticoagulation Patient Findings  Patient Findings     Negatives:   Signs/symptoms of thrombosis, Signs/symptoms of bleeding, Change in health, Change in alcohol use, Change in activity, Missed doses, Extra doses, Change in medications, Change in diet/appetite, Bruising          THIS VISIT CONDUCTED WITH PRES[employer] TELEMEDICINE UTILIZING SECURE AND ENCRYPTED VIDEOCONFERENCING EQUIPMENT  ROS:    Pulm: Denies SOB, chest pain.    Card: Denies syncope, edema, palpitations.    Extremities: Denies redness, pain.     PE:    Pulm: No SOB, even and unlabored.    Card: Normal rate and rhythm.    Extremities: No redness, or edema.     INR  is-therapeutic.    Denies signs/symptoms of bleeding and/or thrombosis.    Denies changes to diet or medications.   Follow up appt in 5 weeks     Plan:  Continue current medication regimen.     Medication: Warfarin (Coumadin)           5 mg    5 mg    7.5 mg    5 mg    5 mg    5 mg    5 mg      1 tab(s)    1 tab(s)    1.5 tab(s)     1 tab(s)    1 tab(s)    1 tab(s)  1 tab(s)     Next Appointment: Tuesday, Jan 14 @ 3:15    Review all of your home medications and newly ordered medications with your doctor and / or pharmacist. Follow medication instructions as directed by your doctor and / or pharmacist. Please keep your medication list with you and share with your physician. Update the information when medications are discontinued, doses are changed, or new medications (including over-the-counter products) are added; and carry medication information at all times in the event of emergency situations.      For questions, please contact Outpatient Anticoagulation Service [phone].   .      Patient is on a high risk medication and therefore requires close monitoring and follow up.     CHEST guidelines recommend frequent INR monitoring at regular intervals (a few days up to a max of 12 weeks) to ensure they are on the proper dose of warfarin and not having any complications from therapy.  INRs can dramatically change over a short time period due to diet, medications, and medical conditions.   The patient instructed to go to the ER for falls with a head injury,  blood in urine or stool or any bleeding that last longer than 20 min.     MARY Gaviria.

## 2020-01-14 ENCOUNTER — TELEPHONE (OUTPATIENT)
Dept: VASCULAR LAB | Facility: MEDICAL CENTER | Age: 85
End: 2020-01-14

## 2020-01-14 DIAGNOSIS — Z95.2 HISTORY OF MITRAL VALVE REPLACEMENT WITH MECHANICAL VALVE: ICD-10-CM

## 2020-01-14 DIAGNOSIS — I63.019 CEREBROVASCULAR ACCIDENT (CVA) DUE TO THROMBOSIS OF VERTEBRAL ARTERY, UNSPECIFIED BLOOD VESSEL LATERALITY (HCC): ICD-10-CM

## 2020-01-14 DIAGNOSIS — Z95.2 HISTORY OF HEART VALVE REPLACEMENT WITH MECHANICAL VALVE: ICD-10-CM

## 2020-01-14 DIAGNOSIS — Z79.01 CHRONIC ANTICOAGULATION: Chronic | ICD-10-CM

## 2020-01-14 NOTE — TELEPHONE ENCOUNTER
Pt's insurance changed to [place] . Lab draw for 4 standing draws given today along with a referral to [place]. MARY Gaviria.

## 2020-01-17 ENCOUNTER — ANTICOAGULATION MONITORING (OUTPATIENT)
Dept: VASCULAR LAB | Facility: MEDICAL CENTER | Age: 85
End: 2020-01-17

## 2020-01-17 DIAGNOSIS — Z95.2 HISTORY OF HEART VALVE REPLACEMENT WITH MECHANICAL VALVE: ICD-10-CM

## 2020-01-17 LAB — INR PPP: 2.2 (ref 2–3.5)

## 2020-01-17 NOTE — PROGRESS NOTES
Anticoagulation Summary  As of 2020    INR goal:   2.5-3.5   TTR:   40.4 % (4.5 y)   INR used for dosin.20! (2020)   Warfarin maintenance plan:   7.5 mg (5 mg x 1.5) every Tue; 5 mg (5 mg x 1) all other days   Weekly warfarin total:   37.5 mg   Plan last modified:   CORAL Gaviria (2019)   Next INR check:   2020   Target end date:   Indefinite    Indications    CVA (cerebral vascular accident) (HCC) [I63.9]  History of heart valve replacement with mechanical valve [V43.3] [Z95.2]             Anticoagulation Episode Summary     INR check location:       Preferred lab:       Send INR reminders to:       Comments:   Pam telemedicine      Anticoagulation Care Providers     Provider Role Specialty Phone number    Caterina HolderD Responsible          Anticoagulation Patient Findings      Spoke to patient on the phone.   INR  sub-therapeutic.   Denies signs/symptoms of bleeding and/or thrombosis.   Denies changes to diet or medications.   Follow up appointment in 2 week(s).    7.5mg today then continue weekly warfarin dose as noted      Loyd Moses, PharmD, MS, BCACP, LCC    This note was created using voice recognition software (Dragon). The accuracy of the dictation is limited by the abilities of the software. I have reviewed the note prior to signing, however some errors in grammar and context are still possible. If you have any questions related to this note please do not hesitate to contact our office.

## 2020-02-05 LAB — INR PPP: 2.8 (ref 2–3.5)

## 2020-02-06 ENCOUNTER — ANTICOAGULATION MONITORING (OUTPATIENT)
Dept: VASCULAR LAB | Facility: MEDICAL CENTER | Age: 85
End: 2020-02-06

## 2020-02-06 DIAGNOSIS — Z95.2 HISTORY OF HEART VALVE REPLACEMENT WITH MECHANICAL VALVE: ICD-10-CM

## 2020-02-06 NOTE — PROGRESS NOTES
Anticoagulation Summary  As of 2020    INR goal:   2.5-3.5   TTR:   40.5 % (4.6 y)   INR used for dosin.80 (2020)   Warfarin maintenance plan:   7.5 mg (5 mg x 1.5) every Tue; 5 mg (5 mg x 1) all other days   Weekly warfarin total:   37.5 mg   Plan last modified:   CORAL Gaviria (2019)   Next INR check:   3/5/2020   Target end date:   Indefinite    Indications    CVA (cerebral vascular accident) (HCC) [I63.9]  History of heart valve replacement with mechanical valve [V43.3] [Z95.2]             Anticoagulation Episode Summary     INR check location:       Preferred lab:       Send INR reminders to:       Comments:   Pam telemedicine      Anticoagulation Care Providers     Provider Role Specialty Phone number    Caterina HloderD Responsible          Anticoagulation Patient Findings      INR  -therapeutic.   Left a voice message for the patient, asked patient to please call the anticoagulation clinic if they have any signs/symptoms of bleeding and/or thrombosis or any changes to diet or medications.    Follow up appointment in 4 week(s), asked her to call to set up appt with telemed.     Continue weekly warfarin dose as noted      Loyd Moses, PharmD, MS, BCACP, LCC    This note was created using voice recognition software (Dragon). The accuracy of the dictation is limited by the abilities of the software. I have reviewed the note prior to signing, however some errors in grammar and context are still possible. If you have any questions related to this note please do not hesitate to contact our office.

## 2020-03-10 LAB — INR PPP: 2.2 (ref 2–3.5)

## 2020-03-17 ENCOUNTER — ANTICOAGULATION MONITORING (OUTPATIENT)
Dept: MEDICAL GROUP | Facility: PHYSICIAN GROUP | Age: 85
End: 2020-03-17

## 2020-03-17 DIAGNOSIS — I63.9 CEREBROVASCULAR ACCIDENT (CVA), UNSPECIFIED MECHANISM (HCC): ICD-10-CM

## 2020-03-17 DIAGNOSIS — Z95.2 HISTORY OF HEART VALVE REPLACEMENT WITH MECHANICAL VALVE: ICD-10-CM

## 2020-03-17 NOTE — PROGRESS NOTES
S/w patient regarding need for refill of warfarin and results from initial home monitor test last week.  Patient states that she has not heard from anyone regarding the results and does not know who ordered it.  Per notes, she has changed insurance and was to be [place] by [facility] going forward.  LM with [facility] to determine if they ordered monitor, if so, will discharge from our services.  Galileo Ramirez, PharmD, BCACP

## 2020-03-18 ENCOUNTER — TELEPHONE (OUTPATIENT)
Dept: VASCULAR LAB | Facility: MEDICAL CENTER | Age: 85
End: 2020-03-18

## 2020-03-18 ENCOUNTER — ANTICOAGULATION MONITORING (OUTPATIENT)
Dept: VASCULAR LAB | Facility: MEDICAL CENTER | Age: 85
End: 2020-03-18

## 2020-03-18 DIAGNOSIS — I63.9 CEREBROVASCULAR ACCIDENT (CVA), UNSPECIFIED MECHANISM (HCC): ICD-10-CM

## 2020-03-18 DIAGNOSIS — Z95.2 HISTORY OF HEART VALVE REPLACEMENT WITH MECHANICAL VALVE: ICD-10-CM

## 2020-03-18 NOTE — TELEPHONE ENCOUNTER
----- Message from Ramonita Simmons sent at 3/18/2020  8:36 AM PDT -----  Regarding: Stefano @ [facility] to inform the patient is taken care of  Stefano @ [facility] to inform the patient is taken care of and he appreciates the phone call. Thank you

## 2020-04-24 DIAGNOSIS — I63.00 CEREBROVASCULAR ACCIDENT (CVA) DUE TO THROMBOSIS OF PRECEREBRAL ARTERY (HCC): ICD-10-CM

## 2020-04-24 RX ORDER — WARFARIN SODIUM 5 MG/1
TABLET ORAL
Qty: 90 TAB | Refills: 0 | OUTPATIENT
Start: 2020-04-24

## 2020-07-31 ENCOUNTER — HOSPITAL ENCOUNTER (OUTPATIENT)
Facility: MEDICAL CENTER | Age: 85
End: 2020-07-31
Payer: MEDICARE

## 2020-07-31 ENCOUNTER — HOSPITAL ENCOUNTER (OUTPATIENT)
Facility: MEDICAL CENTER | Age: 85
End: 2020-08-02
Attending: HOSPITALIST | Admitting: HOSPITALIST
Payer: MEDICARE

## 2020-07-31 DIAGNOSIS — Z95.2 S/P AVR: ICD-10-CM

## 2020-07-31 DIAGNOSIS — I65.22 STENOSIS OF LEFT CAROTID ARTERY: ICD-10-CM

## 2020-07-31 DIAGNOSIS — R00.1 BRADYCARDIA: ICD-10-CM

## 2020-07-31 DIAGNOSIS — G45.9 TIA (TRANSIENT ISCHEMIC ATTACK): ICD-10-CM

## 2020-07-31 PROCEDURE — C9803 HOPD COVID-19 SPEC COLLECT: HCPCS | Performed by: HOSPITALIST

## 2020-07-31 PROCEDURE — G0378 HOSPITAL OBSERVATION PER HR: HCPCS

## 2020-08-01 ENCOUNTER — APPOINTMENT (OUTPATIENT)
Dept: RADIOLOGY | Facility: MEDICAL CENTER | Age: 85
End: 2020-08-01
Attending: INTERNAL MEDICINE
Payer: MEDICARE

## 2020-08-01 PROBLEM — R00.1 BRADYCARDIA: Status: ACTIVE | Noted: 2020-08-01

## 2020-08-01 PROBLEM — G45.9 TIA (TRANSIENT ISCHEMIC ATTACK): Status: ACTIVE | Noted: 2020-08-01

## 2020-08-01 LAB
ALBUMIN SERPL BCP-MCNC: 3.6 G/DL (ref 3.2–4.9)
ALBUMIN/GLOB SERPL: 1.4 G/DL
ALP SERPL-CCNC: 83 U/L (ref 30–99)
ALT SERPL-CCNC: 16 U/L (ref 2–50)
AMPHET UR QL SCN: NEGATIVE
ANION GAP SERPL CALC-SCNC: 14 MMOL/L (ref 7–16)
AST SERPL-CCNC: 22 U/L (ref 12–45)
BARBITURATES UR QL SCN: NEGATIVE
BENZODIAZ UR QL SCN: NEGATIVE
BILIRUB SERPL-MCNC: 0.3 MG/DL (ref 0.1–1.5)
BUN SERPL-MCNC: 21 MG/DL (ref 8–22)
BZE UR QL SCN: NEGATIVE
CALCIUM SERPL-MCNC: 9.4 MG/DL (ref 8.5–10.5)
CANNABINOIDS UR QL SCN: NEGATIVE
CHLORIDE SERPL-SCNC: 106 MMOL/L (ref 96–112)
CO2 SERPL-SCNC: 21 MMOL/L (ref 20–33)
COVID ORDER STATUS COVID19: NORMAL
CREAT SERPL-MCNC: 1.09 MG/DL (ref 0.5–1.4)
EKG IMPRESSION: NORMAL
EST. AVERAGE GLUCOSE BLD GHB EST-MCNC: 192 MG/DL
ETHANOL BLD-MCNC: <10.1 MG/DL (ref 0–10.1)
GLOBULIN SER CALC-MCNC: 2.6 G/DL (ref 1.9–3.5)
GLUCOSE BLD-MCNC: 154 MG/DL (ref 65–99)
GLUCOSE BLD-MCNC: 162 MG/DL (ref 65–99)
GLUCOSE BLD-MCNC: 173 MG/DL (ref 65–99)
GLUCOSE BLD-MCNC: 236 MG/DL (ref 65–99)
GLUCOSE SERPL-MCNC: 187 MG/DL (ref 65–99)
HBA1C MFR BLD: 8.3 % (ref 0–5.6)
INR PPP: 3.79 (ref 0.87–1.13)
METHADONE UR QL SCN: NEGATIVE
OPIATES UR QL SCN: NEGATIVE
OXYCODONE UR QL SCN: NEGATIVE
PCP UR QL SCN: NEGATIVE
POTASSIUM SERPL-SCNC: 4 MMOL/L (ref 3.6–5.5)
PROPOXYPH UR QL SCN: NEGATIVE
PROT SERPL-MCNC: 6.2 G/DL (ref 6–8.2)
PROTHROMBIN TIME: 38.5 SEC (ref 12–14.6)
SARS-COV-2 RNA RESP QL NAA+PROBE: NOTDETECTED
SODIUM SERPL-SCNC: 141 MMOL/L (ref 135–145)
SPECIMEN SOURCE: NORMAL
TROPONIN T SERPL-MCNC: 16 NG/L (ref 6–19)

## 2020-08-01 PROCEDURE — 700102 HCHG RX REV CODE 250 W/ 637 OVERRIDE(OP): Performed by: STUDENT IN AN ORGANIZED HEALTH CARE EDUCATION/TRAINING PROGRAM

## 2020-08-01 PROCEDURE — G0378 HOSPITAL OBSERVATION PER HR: HCPCS

## 2020-08-01 PROCEDURE — 96372 THER/PROPH/DIAG INJ SC/IM: CPT

## 2020-08-01 PROCEDURE — 70551 MRI BRAIN STEM W/O DYE: CPT

## 2020-08-01 PROCEDURE — U0003 INFECTIOUS AGENT DETECTION BY NUCLEIC ACID (DNA OR RNA); SEVERE ACUTE RESPIRATORY SYNDROME CORONAVIRUS 2 (SARS-COV-2) (CORONAVIRUS DISEASE [COVID-19]), AMPLIFIED PROBE TECHNIQUE, MAKING USE OF HIGH THROUGHPUT TECHNOLOGIES AS DESCRIBED BY CMS-2020-01-R: HCPCS

## 2020-08-01 PROCEDURE — 80053 COMPREHEN METABOLIC PANEL: CPT

## 2020-08-01 PROCEDURE — 80307 DRUG TEST PRSMV CHEM ANLYZR: CPT

## 2020-08-01 PROCEDURE — 83036 HEMOGLOBIN GLYCOSYLATED A1C: CPT

## 2020-08-01 PROCEDURE — 92610 EVALUATE SWALLOWING FUNCTION: CPT

## 2020-08-01 PROCEDURE — 84484 ASSAY OF TROPONIN QUANT: CPT

## 2020-08-01 PROCEDURE — 700102 HCHG RX REV CODE 250 W/ 637 OVERRIDE(OP): Performed by: INTERNAL MEDICINE

## 2020-08-01 PROCEDURE — A9270 NON-COVERED ITEM OR SERVICE: HCPCS | Performed by: INTERNAL MEDICINE

## 2020-08-01 PROCEDURE — A9270 NON-COVERED ITEM OR SERVICE: HCPCS | Performed by: STUDENT IN AN ORGANIZED HEALTH CARE EDUCATION/TRAINING PROGRAM

## 2020-08-01 PROCEDURE — 99204 OFFICE O/P NEW MOD 45 MIN: CPT | Performed by: INTERNAL MEDICINE

## 2020-08-01 PROCEDURE — 70498 CT ANGIOGRAPHY NECK: CPT

## 2020-08-01 PROCEDURE — 82962 GLUCOSE BLOOD TEST: CPT

## 2020-08-01 PROCEDURE — 99220 PR INITIAL OBSERVATION CARE,LEVL III: CPT | Performed by: INTERNAL MEDICINE

## 2020-08-01 PROCEDURE — 70496 CT ANGIOGRAPHY HEAD: CPT

## 2020-08-01 PROCEDURE — 36415 COLL VENOUS BLD VENIPUNCTURE: CPT

## 2020-08-01 PROCEDURE — 700117 HCHG RX CONTRAST REV CODE 255: Performed by: INTERNAL MEDICINE

## 2020-08-01 PROCEDURE — 93005 ELECTROCARDIOGRAM TRACING: CPT | Performed by: INTERNAL MEDICINE

## 2020-08-01 PROCEDURE — 85610 PROTHROMBIN TIME: CPT

## 2020-08-01 PROCEDURE — 93010 ELECTROCARDIOGRAM REPORT: CPT | Performed by: INTERNAL MEDICINE

## 2020-08-01 RX ORDER — HYDRALAZINE HYDROCHLORIDE 20 MG/ML
10 INJECTION INTRAMUSCULAR; INTRAVENOUS
Status: DISCONTINUED | OUTPATIENT
Start: 2020-08-01 | End: 2020-08-02 | Stop reason: HOSPADM

## 2020-08-01 RX ORDER — WARFARIN SODIUM 2.5 MG/1
2.5 TABLET ORAL ONCE
Status: DISCONTINUED | OUTPATIENT
Start: 2020-08-01 | End: 2020-08-01

## 2020-08-01 RX ORDER — ACETAMINOPHEN 325 MG/1
650 TABLET ORAL EVERY 6 HOURS PRN
Status: DISCONTINUED | OUTPATIENT
Start: 2020-08-01 | End: 2020-08-02 | Stop reason: HOSPADM

## 2020-08-01 RX ORDER — INSULIN GLARGINE 100 [IU]/ML
16 INJECTION, SOLUTION SUBCUTANEOUS DAILY
Status: DISCONTINUED | OUTPATIENT
Start: 2020-08-01 | End: 2020-08-01

## 2020-08-01 RX ORDER — ASPIRIN 81 MG/1
81 TABLET, CHEWABLE ORAL DAILY
Status: DISCONTINUED | OUTPATIENT
Start: 2020-08-01 | End: 2020-08-02 | Stop reason: HOSPADM

## 2020-08-01 RX ORDER — LABETALOL HYDROCHLORIDE 5 MG/ML
10 INJECTION, SOLUTION INTRAVENOUS EVERY 4 HOURS PRN
Status: DISCONTINUED | OUTPATIENT
Start: 2020-08-01 | End: 2020-08-01

## 2020-08-01 RX ORDER — BUPROPION HYDROCHLORIDE 150 MG/1
150 TABLET, EXTENDED RELEASE ORAL DAILY
Status: DISCONTINUED | OUTPATIENT
Start: 2020-08-01 | End: 2020-08-01

## 2020-08-01 RX ORDER — OXYCODONE HYDROCHLORIDE 5 MG/1
2.5 TABLET ORAL
Status: DISCONTINUED | OUTPATIENT
Start: 2020-08-01 | End: 2020-08-02 | Stop reason: HOSPADM

## 2020-08-01 RX ORDER — TOLTERODINE 4 MG/1
4 CAPSULE, EXTENDED RELEASE ORAL DAILY
Status: DISCONTINUED | OUTPATIENT
Start: 2020-08-01 | End: 2020-08-01

## 2020-08-01 RX ORDER — ASPIRIN 300 MG/1
300 SUPPOSITORY RECTAL DAILY
Status: DISCONTINUED | OUTPATIENT
Start: 2020-08-01 | End: 2020-08-01

## 2020-08-01 RX ORDER — ALENDRONATE SODIUM 70 MG/1
70 TABLET ORAL
Status: ON HOLD | COMMUNITY
End: 2020-08-02

## 2020-08-01 RX ORDER — ALENDRONATE SODIUM 70 MG/1
70 TABLET ORAL
Status: DISCONTINUED | OUTPATIENT
Start: 2020-08-01 | End: 2020-08-01

## 2020-08-01 RX ORDER — OMEPRAZOLE 20 MG/1
20 CAPSULE, DELAYED RELEASE ORAL
Status: DISCONTINUED | OUTPATIENT
Start: 2020-08-01 | End: 2020-08-01

## 2020-08-01 RX ORDER — ATORVASTATIN CALCIUM 40 MG/1
40 TABLET, FILM COATED ORAL EVERY EVENING
Status: DISCONTINUED | OUTPATIENT
Start: 2020-08-01 | End: 2020-08-02 | Stop reason: HOSPADM

## 2020-08-01 RX ORDER — DEXTROSE MONOHYDRATE 25 G/50ML
50 INJECTION, SOLUTION INTRAVENOUS
Status: DISCONTINUED | OUTPATIENT
Start: 2020-08-01 | End: 2020-08-02 | Stop reason: HOSPADM

## 2020-08-01 RX ORDER — BUPROPION HYDROCHLORIDE 150 MG/1
150 TABLET, EXTENDED RELEASE ORAL DAILY
Status: ON HOLD | COMMUNITY
End: 2020-08-02

## 2020-08-01 RX ORDER — HYDROMORPHONE HYDROCHLORIDE 1 MG/ML
0.25 INJECTION, SOLUTION INTRAMUSCULAR; INTRAVENOUS; SUBCUTANEOUS
Status: DISCONTINUED | OUTPATIENT
Start: 2020-08-01 | End: 2020-08-02 | Stop reason: HOSPADM

## 2020-08-01 RX ORDER — WARFARIN SODIUM 2.5 MG/1
2.5 TABLET ORAL
Status: COMPLETED | OUTPATIENT
Start: 2020-08-01 | End: 2020-08-01

## 2020-08-01 RX ORDER — BISACODYL 10 MG
10 SUPPOSITORY, RECTAL RECTAL
Status: DISCONTINUED | OUTPATIENT
Start: 2020-08-01 | End: 2020-08-02 | Stop reason: HOSPADM

## 2020-08-01 RX ORDER — CYCLOBENZAPRINE HCL 10 MG
10 TABLET ORAL EVERY EVENING
Status: DISCONTINUED | OUTPATIENT
Start: 2020-08-01 | End: 2020-08-01

## 2020-08-01 RX ORDER — AMOXICILLIN 250 MG
2 CAPSULE ORAL 2 TIMES DAILY
Status: DISCONTINUED | OUTPATIENT
Start: 2020-08-01 | End: 2020-08-02 | Stop reason: HOSPADM

## 2020-08-01 RX ORDER — LEVOTHYROXINE SODIUM 88 UG/1
88 TABLET ORAL DAILY
Status: DISCONTINUED | OUTPATIENT
Start: 2020-08-01 | End: 2020-08-02 | Stop reason: HOSPADM

## 2020-08-01 RX ORDER — CYCLOBENZAPRINE HCL 10 MG
10 TABLET ORAL
Status: ON HOLD | COMMUNITY
End: 2020-08-02

## 2020-08-01 RX ORDER — TOLTERODINE 4 MG/1
4 CAPSULE, EXTENDED RELEASE ORAL DAILY
Status: ON HOLD | COMMUNITY
End: 2020-08-02

## 2020-08-01 RX ORDER — ASPIRIN 81 MG/1
324 TABLET, CHEWABLE ORAL DAILY
Status: DISCONTINUED | OUTPATIENT
Start: 2020-08-01 | End: 2020-08-01

## 2020-08-01 RX ORDER — OXYCODONE HYDROCHLORIDE 5 MG/1
5 TABLET ORAL
Status: DISCONTINUED | OUTPATIENT
Start: 2020-08-01 | End: 2020-08-02 | Stop reason: HOSPADM

## 2020-08-01 RX ORDER — ONDANSETRON 2 MG/ML
4 INJECTION INTRAMUSCULAR; INTRAVENOUS EVERY 4 HOURS PRN
Status: DISCONTINUED | OUTPATIENT
Start: 2020-08-01 | End: 2020-08-02 | Stop reason: HOSPADM

## 2020-08-01 RX ORDER — ONDANSETRON 4 MG/1
4 TABLET, ORALLY DISINTEGRATING ORAL EVERY 4 HOURS PRN
Status: DISCONTINUED | OUTPATIENT
Start: 2020-08-01 | End: 2020-08-02 | Stop reason: HOSPADM

## 2020-08-01 RX ORDER — ASPIRIN 325 MG
325 TABLET ORAL DAILY
Status: DISCONTINUED | OUTPATIENT
Start: 2020-08-01 | End: 2020-08-01

## 2020-08-01 RX ORDER — INDOMETHACIN 25 MG/1
25 CAPSULE ORAL 3 TIMES DAILY
Status: ON HOLD | COMMUNITY
End: 2020-08-02 | Stop reason: SDUPTHER

## 2020-08-01 RX ORDER — POLYETHYLENE GLYCOL 3350 17 G/17G
1 POWDER, FOR SOLUTION ORAL
Status: DISCONTINUED | OUTPATIENT
Start: 2020-08-01 | End: 2020-08-02 | Stop reason: HOSPADM

## 2020-08-01 RX ADMIN — IOHEXOL 80 ML: 350 INJECTION, SOLUTION INTRAVENOUS at 12:37

## 2020-08-01 RX ADMIN — INSULIN HUMAN 2 UNITS: 100 INJECTION, SOLUTION PARENTERAL at 11:22

## 2020-08-01 RX ADMIN — ATORVASTATIN CALCIUM 40 MG: 40 TABLET, FILM COATED ORAL at 17:31

## 2020-08-01 RX ADMIN — DOCUSATE SODIUM 50 MG AND SENNOSIDES 8.6 MG 2 TABLET: 8.6; 5 TABLET, FILM COATED ORAL at 17:31

## 2020-08-01 RX ADMIN — INSULIN HUMAN 1 UNITS: 100 INJECTION, SOLUTION PARENTERAL at 17:40

## 2020-08-01 RX ADMIN — WARFARIN SODIUM 2.5 MG: 2.5 TABLET ORAL at 17:31

## 2020-08-01 RX ADMIN — INSULIN HUMAN 1 UNITS: 100 INJECTION, SOLUTION PARENTERAL at 21:24

## 2020-08-01 ASSESSMENT — ENCOUNTER SYMPTOMS
DIZZINESS: 0
FEVER: 0
HEADACHES: 0
SPUTUM PRODUCTION: 0
BACK PAIN: 0
SHORTNESS OF BREATH: 0
CHEST TIGHTNESS: 0
POLYDIPSIA: 0
PALPITATIONS: 0
HEARTBURN: 0
HEMOPTYSIS: 0
HALLUCINATIONS: 0
ABDOMINAL PAIN: 0
SPEECH DIFFICULTY: 1
CHILLS: 0
NAUSEA: 0
NECK PAIN: 0
WEAKNESS: 1
VOMITING: 0
FLANK PAIN: 0
WEIGHT LOSS: 0
LIGHT-HEADEDNESS: 0
TREMORS: 0
ORTHOPNEA: 0
SPEECH CHANGE: 1
PHOTOPHOBIA: 0
NERVOUS/ANXIOUS: 0
FOCAL WEAKNESS: 1
DOUBLE VISION: 0
COUGH: 0
BLURRED VISION: 0
BRUISES/BLEEDS EASILY: 0

## 2020-08-01 ASSESSMENT — LIFESTYLE VARIABLES: SUBSTANCE_ABUSE: 0

## 2020-08-01 NOTE — PROGRESS NOTES
Direct admit from [facility] from a Dr. Morris.  Concern of TIA with symptoms of weakness inability to walk and some confusion approximately 2 hours prior to arrival to the hospital but resolved sometime shortly thereafter.  Physicians at outside facility were concerned of TIA and wanted patient to have an MRI.  Patient was already therapeutic with an INR 3.6 on warfarin.  CT of the head showed prior old encephalomalacia and no new head bleed.  Transferring physicians were also concerned of ongoing bradycardia which resolved.  EKG showed a heart rate of 67 and when asked they told me the patient's heart rate was in the 60s.  Transferring physicians did not feel comfortable holding onto their patient and requested for cardiology and neurology consult.

## 2020-08-01 NOTE — PROGRESS NOTES
2 RN skin check with ADALBERTO Crawford.    - Bilateral bruising of UE    - R forearm scar     - Bilateral bruising/purpura of LE    - Midline sternal scar    - Upper buttock wound, skin is red but blanching (picture taken)    - Skin is otherwise CDI and blanching over bony prominences.

## 2020-08-01 NOTE — ASSESSMENT & PLAN NOTE
Patient was noted to have heart rate 40 (lowest 39) at outside facility emergency department, while she was asymptomatic.   She is not on any eli medications.  Her heart rate is currently 67  Plan to monitor patient on telemetry.    Cardiology following.

## 2020-08-01 NOTE — PROGRESS NOTES
0030: Direct admitting hospitalist, Dr. Riddle, notified of new patient arrival.    0105: Per order, neurology/cardiology notified of new patient arrival:    0110: Per Dr. Blum, neuro to see patient in AM.    0300: Dr. Hernandez at bedside.

## 2020-08-01 NOTE — THERAPY
"Speech Language Pathology   Clinical Swallow Evaluation     Patient Name: Adrienne Mejia  AGE:  85 y.o., SEX:  female  Medical Record #: [id number]  Today's Date: 8/1/2020     Precautions: Swallow Precautions (See Comments)    Assessment    Patient is 85 y.o. female with \"past medical history of diabetes type 2 on insulin, GI bleed, hypertension, dyslipidemia, stroke with residual visual field deficit, mechanical aortic valve replacement in 1999, hypothyroidism, chronic anticoagulation with warfarin, who was transferred from [facility] with concern for possible transitory ischemic attack.\" CT head without contrast showed no acute abnormalities.  There is large area of encephalomalacia on the right consistent with prior CVA, mild diffuse cerebral volume loss. MRI is pending. Pt with no SLP hx on file.    Pt seen today for CSE. Pt was AAOx4 and denied hx of dysphagia and/or PNA. Pt reported \"my bottom denture plate doesn't fit well because I don't have much bone left\" and she intends on getting dental implants. Oral mech revealed reduced lingual and labial strength L>R. Pt was provided with PO trials (as noted below). Pt had delayed coughing and throat clearing with tsps and cup sips of thin liquids, which is concerning for possible aspiration/penetration. Pt demonstrated improved tolerance with tsps and cup sips of mildly thick liquids, with no overt clinical s/sx of asp/pen noted. Pt then consumed applesauce, pudding and fruit cocktail with appropriate feeding rate and no overt clinical s/sx of aspiration/penetration. Pt with slightly prolonged mastication with chopped fruit; however, this appeared functional. Pt endorsed feeling tired and requested to lay back and rest.    At this time, recommend strict adherence to safe swallow precautions with PO diet of Soft, bite-sized solids with Mildly thick liquids and meds floated in puree. Please hold PO if any difficulties/concerns or change in status.    Note: Pt " with mild dysarthria vs. confusion noted during session; consider cognitive-linguistic evaluation.    Plan    Recommend Speech Therapy 5 times per week until therapy goals are met for the following treatments:  Dysphagia Training and Patient / Family / Caregiver Education.    Discharge recommendations:  Recommend inpatient transitional care services for continued speech therapy services.         Objective       08/01/20 1126   Prior Level Of Function   Communication Within Functional Limits   Swallow Within Functional Limits   Oral Motor Eval    Is Patient Able to Complete Oral Motor Eval Yes but Impaired   Labial Function   Labial Structure At Rest Within Functional Limits   Labial Vowel Production / I /, / U / Minimal   Labial Sequence / I /, / U / Minimal   Frown, Pucker Minimal   Lingual Function   Lingual Structure At Rest Within Functional Limits   Lingual Protrude Minimal   Lateralization No Impairment Right;No Impairment Left   Lick Lips (Circular) Within Functional Limits   Laryngeal Function   Voice Quality Within Functional Limits   Volutional Cough Within Functional Limits   Excursion Upon Swallow Complete   Oral Food Presentation   Single Swallow Mildly Thick (2) - (Nectar Thick)  Within Functional Limits   Single Swallow Thin (0) Moderate   Liquidised (3) Within Functional Limits   Pureed (4) Within Functional Limits   Soft & Bite-Sized (6) - (Dysphagia III) Within Functional Limits   Self Feeding Independent   Dysphagia Strategies / Recommendations   Compensatory Strategies Monitor During Meals;Head of Bed 90 Degrees During Eating / Drinking;Single Sips / Bites;No Talking During Eating / Drinking   Diet / Liquid Recommendation Soft & Bite-Sized (6) - (Dysphagia III);Mildly Thick (2) - (Nectar Thick)   Medication Administration  Float Whole with Puree   Therapy Interventions Dysphagia Therapy By Speech Language Pathologist   Short Term Goals   Short Term Goal # 1 Pt will consume PO diet of SB6/MT2 with  no clinical s/sx of aspiration/penetration.   Anticipated Discharge Needs   Therapy Recommendations Upon DC Dysphagia Training;Patient / Family / Caregiver Education

## 2020-08-01 NOTE — PROGRESS NOTES
Monitor summary: SB/SR 56-61 with rate as low as 32, HI 0.28, QRS 0.12, QT 0.42, per strip from monitor room.

## 2020-08-01 NOTE — PROGRESS NOTES
1200: Pt off floor for CT with pt transport via wheelchair.     1240: Pt returned from CT with pt transport via wheelchair.     1400: Pt off floor for MRI with pt transport via wheelchair    1430: Pt back from MRI with pt transport via wheelchair

## 2020-08-01 NOTE — CARE PLAN
Problem: Communication:  Goal: The ability to communicate needs accurately and effectively will improve  Outcome: PROGRESSING AS EXPECTED  Note: Pt using call light appropriately     Problem: Knowledge Deficit:  Goal: Knowledge of disease process/condition, treatment plan, diagnostic tests, and medications will improve  Outcome: PROGRESSING AS EXPECTED  Note: Frequently educating pt about treatment regimen, tests, and plan of care, allowing pt to ask questions and verbalize concerns

## 2020-08-01 NOTE — H&P
"Hospital Medicine History & Physical Note    Date of Service  8/1/2020    Primary Care Physician  Galileo Aguirre D.O.    Code Status  Full code    Chief Complaint  Left-sided weakness, speech changes    History of Presenting Illness  85 y.o. female with past medical history of diabetes type 2 on insulin, GI bleed, hypertension, dyslipidemia, stroke 2010 with residual visual field deficit, mechanical aortic valve replacement in 1999, hypothyroidism, chronic anticoagulation with warfarin, who was transferred from [facility] with concern for possible transitory ischemic attack.  Patient was transferred due to absence of MRI machine at outside facility  Patient was presented at outside facility emergency department with complaints of weakness in the left upper and lower extremity and slurred speech that started approximately at 3 PM.  Apparently, symptoms quickly improved;  no neurological deficit documented on physical exam at outside facility.  Vitals characteristic for slight hypertension 160/70, on room air.  CBC showed white blood cells 5.7, RBC 4.5, hemoglobin 14, platelets 166  .  INR 3.6, APTT 50.7.  Blood sugar 197, BUN 25, creatinine 1.32, sodium 141, potassium 4.3, chloride 114, bicarb 20, anion gap 7, calcium 9.4, protein 7.3, albumin 3.6, AST, ALT, alkaline phosphatase within normal limits.  Troponin is not elevated.  CT head without contrast showed no acute abnormalities.  There is large area of encephalomalacia on the right consistent with prior CVA, mild diffuse cerebral volume loss.  In ER she was noted to have several episodes of bradycardia with heart rate at 40.  Patient remained asymptomatic.  Transferring physician  spoke to Dr. Nava cardiology   about the bradycardia.  EKG: Heart rate 67, QTc 489, horizontal position, no ischemic changes.  At the moment, patient did not display focal motor weakness.  Her speech is clear, but she stated it is still \" funny\"  Review of Systems  Review of " Systems   Constitutional: Negative for chills, fever and weight loss.   HENT: Negative for ear pain, hearing loss and tinnitus.    Eyes: Negative for blurred vision, double vision and photophobia.   Respiratory: Negative for cough, hemoptysis and sputum production.    Cardiovascular: Negative for chest pain, palpitations and orthopnea.   Gastrointestinal: Negative for heartburn, nausea and vomiting.   Genitourinary: Negative for dysuria, flank pain, frequency and hematuria.   Musculoskeletal: Negative for back pain, joint pain and neck pain.   Skin: Negative for itching and rash.   Neurological: Positive for speech change and focal weakness. Negative for tremors and headaches.   Endo/Heme/Allergies: Negative for environmental allergies and polydipsia. Does not bruise/bleed easily.   Psychiatric/Behavioral: Negative for hallucinations and substance abuse. The patient is not nervous/anxious.        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), Benign essential HTN (4/27/2012), Cancer ([facility]), Chronic anticoagulation (4/27/2012), CVA (cerebral vascular accident) ([facility]), DM (diabetes mellitus) ([facility]) (4/27/2012), Fecal urgency (3/7/2013), GERD (gastroesophageal reflux disease), GI bleed (12/3/2014), echocardiogram (4/27/2012), Hyperlipidemia, Hypertension, Melanoma ([facility]), S/P cardiac catheterization (4/27/2012), Stroke ([facility]), Urgency of urination (3/7/2013), and UTI (lower urinary tract infection) (12/3/2014).    Surgical History   has a past surgical history that includes other cardiac surgery; cholecystectomy; aortic valve replacement (1999); abdominal hysterectomy total (1960); cataract phaco with iol (7/18/2013); and cataract phaco with iol (8/1/2013).     Family History  family history includes Heart Disease in her father; Heart Failure in her father.     Social History   reports that she has never smoked. She has never used smokeless tobacco. She reports current alcohol use. She  reports that she does not use drugs.    Allergies  No Known Allergies    Medications  Prior to Admission Medications   Prescriptions Last Dose Informant Patient Reported? Taking?   Diclofenac Sodium (VOLTAREN) 1 % Gel   No No   Sig: Apply 2 g to skin as directed 4 times a day.   acetaminophen (TYLENOL 8 HOUR) 650 MG CR tablet 7/30/2020 at 2300  Yes No   Sig: Take 650 mg by mouth every four hours as needed for Mild Pain.   alendronate (FOSAMAX) 70 MG Tab   Yes Yes   Sig: Take 70 mg by mouth every 7 days.   aspirin (ASA) 81 MG Chew Tab chewable tablet 7/30/2020 at 2300  Yes No   Sig: Take 81 mg by mouth every day.   buPROPion SR (WELLBUTRIN-SR) 150 MG TABLET SR 12 HR sustained-release tablet   Yes Yes   Sig: Take 150 mg by mouth every day.   cyclobenzaprine (FLEXERIL) 10 MG Tab   Yes Yes   Sig: Take 10 mg by mouth. 0.5-1 tab daily at bedtime   enoxaparin (LOVENOX) 100 MG/ML Solution inj   No No   Sig: Inject 100 mg as instructed every day.   glimepiride (AMARYL) 4 MG TABS   Yes No   Sig: Take 4 mg by mouth every morning.   indomethacin (INDOCIN) 25 MG Cap   Yes Yes   Sig: Take 25 mg by mouth 3 times a day. Indications: Acute Joint Inflammation in Gout   insulin glargine (LANTUS SOLOSTAR) 100 UNIT/ML SOLN   No No   Sig: Inject 16 Units as instructed every day.   levothyroxine (SYNTHROID) 88 MCG TABS 7/31/2020 at 0400  No No   Sig: Take 1 Tab by mouth every day.   loratadine (CLARITIN) 10 MG TABS   No No   Sig: Take 1 Tab by mouth every day.   omeprazole (PRILOSEC) 20 MG delayed-release capsule 6/1/2020  No No   Sig: Take 1 Cap by mouth every day.   pravastatin (PRAVACHOL) 40 MG tablet   No No   Sig: Take 1 Tab by mouth every day.   sertraline (ZOLOFT) 50 MG Tab   Yes No   Sig: Take 50 mg by mouth every day.   tolterodine ER (DETROL LA) 4 MG CAPSULE SR 24 HR   Yes Yes   Sig: Take 4 mg by mouth every day.   vitamin D (CHOLECALCIFEROL) 1000 UNIT TABS   Yes No   Sig: Take 1,000 Units by mouth 2 Times a Day.   warfarin  (COUMADIN) 5 MG Tab 7/30/2020 at 2300  No No   Sig: TAKE 1 TABLET BY MOUTH ONCE DAILY AS DIRECTED [facility]  [facility]      Facility-Administered Medications: None       Physical Exam  Temp:  [36.2 °C (97.1 °F)] 36.2 °C (97.1 °F)  Pulse:  [64] 64  BP: (186)/(71) 186/71  SpO2:  [96 %] 96 %    Physical Exam  Vitals signs and nursing note reviewed.   Constitutional:       General: She is not in acute distress.     Appearance: Normal appearance.   HENT:      Head: Normocephalic and atraumatic.      Nose: Nose normal.      Mouth/Throat:      Mouth: Mucous membranes are moist.   Eyes:      Extraocular Movements: Extraocular movements intact.      Pupils: Pupils are equal, round, and reactive to light.   Neck:      Musculoskeletal: Normal range of motion and neck supple.   Cardiovascular:      Rate and Rhythm: Normal rate and regular rhythm.      Heart sounds: Murmur present. Systolic murmur present.      Comments: Mechanical aortic valve murmur  Pulmonary:      Effort: Pulmonary effort is normal.      Breath sounds: Normal breath sounds.   Abdominal:      General: Abdomen is flat. There is no distension.      Tenderness: There is no abdominal tenderness.   Musculoskeletal: Normal range of motion.         General: No swelling or deformity.   Skin:     General: Skin is warm and dry.   Neurological:      General: No focal deficit present.      Mental Status: She is alert and oriented to person, place, and time.   Psychiatric:         Mood and Affect: Mood normal.         Behavior: Behavior normal.         Laboratory:          No results for input(s): ALTSGPT, ASTSGOT, ALKPHOSPHAT, TBILIRUBIN, DBILIRUBIN, GAMMAGT, AMYLASE, LIPASE, ALB, PREALBUMIN, GLUCOSE in the last 72 hours.      No results for input(s): NTPROBNP in the last 72 hours.      No results for input(s): TROPONINT in the last 72 hours.    Imaging:  No orders to display         Assessment/Plan:  I anticipate this patient is appropriate for  observation status at this time.    TIA (transient ischemic attack)  Assessment & Plan  CT head without contrast at outside facility showed encephalomalacia in the right temporal lobe, from prior CVA.  No acute changes.  Patient is not a candidate for TPA as motor deficit resolved.  Patient will be admitted to neurology/telemetry  MRI of the brain, CTA of head and neck ordered  Transthoracic echo  Neurochecks every 4 hours    Chronic anticoagulation- (present on admission)  Assessment & Plan  Continue warfarin for mechanical aortic valve.  Goal INR 2.5-3.5  Transthoracic echo ordered    Bradycardia  Assessment & Plan  Patient was noted to have heart rate 40 (lowest 39) at outside facility emergency department, while she was asymptomatic.  Case was discussed with Dr. Nava before transfer  She is not on any eli medications.  Her heart rate is currently 67  Plan to monitor patient on telemetry.  Check TSH.    DM (diabetes mellitus) (HCC)- (present on admission)  Assessment & Plan  She denies current use of insulin  Start sliding scale, hold glimepiride

## 2020-08-01 NOTE — THERAPY
Missed Therapy     Patient Name: Adrienne Mejia  Age:  85 y.o., Sex:  female  Medical Record #: [id number]  Today's Date: 8/1/2020    Discussed missed therapy with RN        08/01/20 3461   Initial Contact Note    Initial Contact Note Order Received and Verified, Occupational Therapy Evaluation in Progress with Full Report to Follow.   Interdisciplinary Plan of Care Collaboration   Collaboration Comments attempted out to MRI   Session Information   Date / Session Number  8/1 attempted

## 2020-08-01 NOTE — PROGRESS NOTES
Inpatient Anticoagulation Service Note    Date: 2020    Reason for Anticoagulation: Mechanical Valve Replacement w/ Other Risk Factors, Transient Ischemic Attack   Target INR: 2.5 to 3.5     INR from last 7 days     Date/Time INR Value    20 0207  (!) 3.79        Dose from last 7 days     Date/Time Dose (mg)    20 0704  2.5        Average Dose (mg): 5  Significant Interactions: Aspirin, Statin  Bridge Therapy: No  Reversal Agent Administered: Not Applicable    Comments: Pt was admitted for TIA, on warfarin for hx of stroke and mechanical valve replacement, with goal 2.5-3.5. INR was at 3.79 on admission, supra-therapeutic. H/H/plt WNL, no overt bleeding per chart review. DDI noted and was established prior on admission. Pt is NPO    Plan:    Pt is high clotting risk, 2.5 mg once.   Trend INR daily and dose warfarin as appropriate.   Education Material Provided?: No(chronic warfarin pt)  Pharmacist suggested discharge dosinmg daily, and check INR within 3 days of discharge.     Mercedez Pandey, CaterinaD

## 2020-08-01 NOTE — CONSULTS
Cardiology Initial Consultation    Date of Service  8/1/2020    Referring Physician  Aidan Anders D.O.    Reason for Consultation  Sinus bradycardia    History of Presenting Illness  Adrienne Mejia is a 85 y.o. female with a past medical history of mechanical aortic valve replacement, anticoagulation on warfarin hypertension, CVA, dyslipidemia, diabetes mellitus and CKD who was transferred from [facility] 7/31/2020 for management of an acute CVA.  The patient was noted to have brief periods of sinus bradycardia down to 39 bpm which were asymptomatic.    The patient states that she was in her usual state of good health until this afternoon when she developed weakness of her right hand and arm being unable to hold and write with a pen.  She feels both her speech and her right arm weakness have improved.  She had bilateral leg weakness.  No headache.  Denies loss of consciousness or lightheadedness.  No chest pain or shortness of breath.    She was evaluated at [facility] in [place], [place].  Her blood pressure was significantly elevated at 180/90.  EKG monitor demonstrated brief periods of asymptomatic sinus bradycardia at 39 bpm.  Twelve-lead EKG showed sinus rhythm rate 67 first-degree AV block and PVC or apparent PAC unchanged from prior tracing.  Head CT scan showed no acute abnormalities with large area of encephalomalacia in the right hemisphere consistent with prior known cerebrovascular accident in 2010.  She has had no prior history of coronary artery disease and did not require bypass grafting at the time of her AVR.    Until her current illness the patient was in good health.  She is very active.  She has been somewhat limited due to the COVID-19 restrictions but otherwise plays tennis on a regular basis and yesterday evening square dance for 2 hours without any difficulty.    The patient had previously been under the care of [facility]  until 2015 but due to insurance changes is now under the care of Dr. Dennis Danielle [place] cardiology, her last clinic visit earlier this year and was clinically stable.    Review of Systems  Review of Systems   Respiratory: Negative for chest tightness and shortness of breath.    Cardiovascular: Negative for chest pain and palpitations.   Gastrointestinal: Negative for abdominal pain, nausea and vomiting.   Neurological: Positive for speech difficulty and weakness. Negative for dizziness, light-headedness and headaches.   All other systems reviewed and are negative.      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), Benign essential HTN (4/27/2012), Cancer ([facility]), Chronic anticoagulation (4/27/2012), CVA (cerebral vascular accident) ([facility]), DM (diabetes mellitus) ([facility]) (4/27/2012), Fecal urgency (3/7/2013), GERD (gastroesophageal reflux disease), GI bleed (12/3/2014), echocardiogram (4/27/2012), Hyperlipidemia, Hypertension, Melanoma ([facility]), S/P cardiac catheterization (4/27/2012), Stroke ([facility]), Urgency of urination (3/7/2013), and UTI (lower urinary tract infection) (12/3/2014). She also has no past medical history of Congestive heart failure ([facility]) or Pneumonia.    Surgical History   has a past surgical history that includes other cardiac surgery; cholecystectomy; aortic valve replacement (1999); abdominal hysterectomy total (1960); cataract phaco with iol (7/18/2013); and cataract phaco with iol (8/1/2013).    Family History  family history includes Heart Disease in her father; Heart Failure in her father.    Social History   reports that she has never smoked. She has never used smokeless tobacco. She reports current alcohol use. She reports that she does not use drugs.    Medications  Prior to Admission Medications   Prescriptions Last Dose Informant Patient Reported? Taking?   Diclofenac Sodium (VOLTAREN) 1 % Gel   No No   Sig: Apply 2 g to skin as directed 4 times a day.    acetaminophen (TYLENOL 8 HOUR) 650 MG CR tablet 7/30/2020 at 2300  Yes No   Sig: Take 650 mg by mouth every four hours as needed for Mild Pain.   alendronate (FOSAMAX) 70 MG Tab   Yes Yes   Sig: Take 70 mg by mouth every 7 days.   aspirin (ASA) 81 MG Chew Tab chewable tablet 7/30/2020 at 2300  Yes No   Sig: Take 81 mg by mouth every day.   buPROPion SR (WELLBUTRIN-SR) 150 MG TABLET SR 12 HR sustained-release tablet   Yes Yes   Sig: Take 150 mg by mouth every day.   cyclobenzaprine (FLEXERIL) 10 MG Tab   Yes Yes   Sig: Take 10 mg by mouth. 0.5-1 tab daily at bedtime   enoxaparin (LOVENOX) 100 MG/ML Solution inj   No No   Sig: Inject 100 mg as instructed every day.   glimepiride (AMARYL) 4 MG TABS   Yes No   Sig: Take 4 mg by mouth every morning.   indomethacin (INDOCIN) 25 MG Cap   Yes Yes   Sig: Take 25 mg by mouth 3 times a day. Indications: Acute Joint Inflammation in Gout   insulin glargine (LANTUS SOLOSTAR) 100 UNIT/ML SOLN   No No   Sig: Inject 16 Units as instructed every day.   levothyroxine (SYNTHROID) 88 MCG TABS 7/31/2020 at 0400  No No   Sig: Take 1 Tab by mouth every day.   loratadine (CLARITIN) 10 MG TABS   No No   Sig: Take 1 Tab by mouth every day.   omeprazole (PRILOSEC) 20 MG delayed-release capsule 6/1/2020  No No   Sig: Take 1 Cap by mouth every day.   pravastatin (PRAVACHOL) 40 MG tablet   No No   Sig: Take 1 Tab by mouth every day.   sertraline (ZOLOFT) 50 MG Tab   Yes No   Sig: Take 50 mg by mouth every day.   tolterodine ER (DETROL LA) 4 MG CAPSULE SR 24 HR   Yes Yes   Sig: Take 4 mg by mouth every day.   vitamin D (CHOLECALCIFEROL) 1000 UNIT TABS   Yes No   Sig: Take 1,000 Units by mouth 2 Times a Day.   warfarin (COUMADIN) 5 MG Tab 7/30/2020 at 2300  No No   Sig: TAKE 1 TABLET BY MOUTH ONCE DAILY AS DIRECTED BY  [facility]  ANTICOAGULATION  SERVICES      Facility-Administered Medications: None       Allergies  No Known Allergies    Vital signs in last 24 hours  Temp:  [36.2 °C (97.1 °F)] 36.2  °C (97.1 °F)  Pulse:  [64] 64  BP: (186)/(71) 186/71  SpO2:  [96 %] 96 %    Physical Exam  Physical Exam  Constitutional:       General: She is not in acute distress.  HENT:      Head: Normocephalic.      Comments: No facial asymmetry  Eyes:      General: No scleral icterus.     Conjunctiva/sclera: Conjunctivae normal.      Pupils: Pupils are equal, round, and reactive to light.   Neck:      Thyroid: No thyromegaly.      Vascular: No carotid bruit.      Comments: Normal jugular venous pressure.  Cardiovascular:      Rate and Rhythm: Normal rate and regular rhythm.      Pulses:           Carotid pulses are 1+ on the right side and 1+ on the left side.       Radial pulses are 1+ on the right side and 1+ on the left side.        Posterior tibial pulses are 1+ on the right side and 1+ on the left side.      Heart sounds: S1 normal and S2 normal. Murmur present. Systolic murmur present with a grade of 1/6. No friction rub. No gallop.       Comments: S2 click  Pulmonary:      Effort: Pulmonary effort is normal.      Breath sounds: Normal breath sounds. No wheezing, rhonchi or rales.   Chest:      Comments: Midline sternal scar.  Abdominal:      General: Bowel sounds are normal. There is no abdominal bruit.      Palpations: Abdomen is soft. There is no mass or pulsatile mass.      Tenderness: There is no abdominal tenderness.   Lymphadenopathy:      Cervical: No cervical adenopathy.   Skin:     General: Skin is warm and dry.      Nails: There is no clubbing.     Neurological:      Mental Status: She is alert and oriented to person, place, and time.      Motor: Weakness present.      Comments: Mild discoordination of her right hand and weakness of handgrip.   Psychiatric:         Behavior: Behavior normal.         Lab Review  Lab Results   Component Value Date/Time    WBC 8.3 12/03/2014 10:38 AM    RBC 4.56 12/03/2014 10:38 AM    HEMOGLOBIN 14.1 12/03/2014 10:38 AM    HEMATOCRIT 43.0 12/03/2014 10:38 AM    MCV 94.3  12/03/2014 10:38 AM    MCH 30.9 12/03/2014 10:38 AM    MCHC 32.8 (L) 12/03/2014 10:38 AM    MPV 11.8 12/03/2014 10:38 AM      Lab Results   Component Value Date/Time    SODIUM 137 09/25/2015 11:09 AM    POTASSIUM 3.8 09/25/2015 11:09 AM    CHLORIDE 105 09/25/2015 11:09 AM    CO2 23 09/25/2015 11:09 AM    GLUCOSE 233 (H) 09/25/2015 11:09 AM    BUN 34 (H) 09/25/2015 11:09 AM    CREATININE 1.37 09/25/2015 11:09 AM      Lab Results   Component Value Date/Time    ASTSGOT 23 09/25/2015 11:09 AM    ALTSGPT 15 09/25/2015 11:09 AM     Lab Results   Component Value Date/Time    CHOLSTRLTOT 200 (H) 05/04/2016 11:18 AM     (H) 05/04/2016 11:18 AM    HDL 50 05/04/2016 11:18 AM    TRIGLYCERIDE 116 05/04/2016 11:18 AM    TROPONINT 16 08/01/2020 02:07 AM       No results for input(s): NTPROBNP in the last 72 hours.    Cardiac Imaging and Procedures Review  EKG:  My personal interpretation of the EKG dated 07/3/2020 is normal sinus rhythm, rate 67.  First-degree AV block.  PVC or apparent PAC.    Echocardiogram: Pending    Imaging  Chest X-Ray: Pending    Assessment/Plan  1.  Intermittent asymptomatic sinus bradycardia.  2.  Hypertension chronic uncontrolled.  3.  Acute cerebrovascular accident involving speech and right upper extremity.  4.  S/P AVR mechanical 1999 [facility]  5.  Anticoagulation therapeutic INR  6.  CVA 2010 right parietal occipital hemorrhage.  7.  Dyslipidemia.  8.  Diabetes mellitus.  9.  Hypothyroidism.    Recommendation Discussion  1.  Continuous telemetry monitoring.  2.  EKG.  3.  Echocardiogram.  4.  Optimally from a cardiac standpoint would be aggressive in normalizing blood pressure but will defer to neurology in view of acute cerebrovascular event.  5.  In treating the patient's hypertension would avoid beta-blockers or calcium channel blockers such as Cardizem or verapamil to prevent precipitation of bradycardia.  6.  Otherwise continue preadmission medication.  7.  Check  thyroid function test.  8.  Await neurological evaluation    Thank you for allowing me to participate in the care of this patient.      Please contact me with any questions.    Billy Hernandez M.D.   Cardiologist, [facility]  [phone]

## 2020-08-01 NOTE — PROGRESS NOTES
Patient seen and examined this morning.    85 years old female past medical history of mechanical aortic valve replacement INR therapeutic, anticoagulation on warfarin hypertension, CVA, dyslipidemia, diabetes mellitus and CKD. Presented from [facility] for acute CVA. Brief episode of bradycardia to 39bpm and asymptomatic.    1. Bradycardia:  - asymptomatic   - avoid AV eli blockers  - Pending ECHO    2. covid rule out pending     3. Acute CVA:  - Pending neurology consult, CTA head and neck and MR brain pending     4. S/p mechanical aortic valve replacement:  - INR is therapeutic, goal 3  - acceptable range 2.5-3.5

## 2020-08-01 NOTE — ASSESSMENT & PLAN NOTE
CT head without contrast at outside facility showed encephalomalacia in the right temporal lobe, from prior CVA.  No acute changes.  Patient is not a candidate for TPA as motor deficit resolved.  MRI of the brain, CTA of head and neck-chronic infarcts with 75% carotid stenosis on the left side-she will follow-up with vascular surgery in the clinic.  Transthoracic echo pending  Neurochecks every 4 hours  Continue aspirin/statins.  PCP follow-up for blood pressure management.

## 2020-08-01 NOTE — PROGRESS NOTES
Transfer Center:    Received ED to Direct Admit transfer request from [facility] @ [phone]  Sending Physician:  Dr. Morris   Specialist consulted:  Dr. Hernandez, Cardiology and Neurology when arrive  Diagnosis:  TIA and Bradycardia   Patient accepted by:  Dr. Anders     Patient coming via:  [facility]  ETA:  TBD room available   Medical records from sending facility scanned into Media tab.  Nursing to notify Direct Admit On-Call hospitalist and Specialist when patient arrives.     Plan:  Transfer Center to assist as needed.

## 2020-08-01 NOTE — PROGRESS NOTES
Patient seen and examined this morning.  No acute events overnight.  Patient with no new complaints this morning.  Neurologically intact, alert oriented x3-4, moving everything, no neurological deficit noted.    She was admitted overnight and transferred here for MRI to rule out an acute stroke.  Also been evaluated for bradycardia.  Neurology and cardiology services both involved.  Currently awaiting further work-up-CTA head and neck, MRI brain and TTE.    For further information please see H&P by Dr. Riddle.

## 2020-08-02 ENCOUNTER — APPOINTMENT (OUTPATIENT)
Dept: CARDIOLOGY | Facility: MEDICAL CENTER | Age: 85
End: 2020-08-02
Attending: INTERNAL MEDICINE
Payer: MEDICARE

## 2020-08-02 VITALS
OXYGEN SATURATION: 95 % | SYSTOLIC BLOOD PRESSURE: 130 MMHG | HEART RATE: 85 BPM | DIASTOLIC BLOOD PRESSURE: 70 MMHG | BODY MASS INDEX: 28.11 KG/M2 | HEIGHT: 62 IN | TEMPERATURE: 97.1 F | WEIGHT: 152.78 LBS | RESPIRATION RATE: 16 BRPM

## 2020-08-02 PROBLEM — R00.1 BRADYCARDIA: Status: RESOLVED | Noted: 2020-08-01 | Resolved: 2020-08-02

## 2020-08-02 PROBLEM — I65.22 CAROTID STENOSIS, LEFT: Status: ACTIVE | Noted: 2020-08-02

## 2020-08-02 PROBLEM — G45.9 TIA (TRANSIENT ISCHEMIC ATTACK): Status: RESOLVED | Noted: 2020-08-01 | Resolved: 2020-08-02

## 2020-08-02 LAB
ANION GAP SERPL CALC-SCNC: 12 MMOL/L (ref 7–16)
BASOPHILS # BLD AUTO: 0.8 % (ref 0–1.8)
BASOPHILS # BLD: 0.05 K/UL (ref 0–0.12)
BUN SERPL-MCNC: 16 MG/DL (ref 8–22)
CALCIUM SERPL-MCNC: 9.2 MG/DL (ref 8.5–10.5)
CHLORIDE SERPL-SCNC: 107 MMOL/L (ref 96–112)
CHOLEST SERPL-MCNC: 181 MG/DL (ref 100–199)
CO2 SERPL-SCNC: 23 MMOL/L (ref 20–33)
CREAT SERPL-MCNC: 1.08 MG/DL (ref 0.5–1.4)
EOSINOPHIL # BLD AUTO: 0.2 K/UL (ref 0–0.51)
EOSINOPHIL NFR BLD: 3.4 % (ref 0–6.9)
ERYTHROCYTE [DISTWIDTH] IN BLOOD BY AUTOMATED COUNT: 45.5 FL (ref 35.9–50)
GLUCOSE BLD-MCNC: 159 MG/DL (ref 65–99)
GLUCOSE BLD-MCNC: 168 MG/DL (ref 65–99)
GLUCOSE SERPL-MCNC: 189 MG/DL (ref 65–99)
HCT VFR BLD AUTO: 43.4 % (ref 37–47)
HDLC SERPL-MCNC: 41 MG/DL
HGB BLD-MCNC: 14.1 G/DL (ref 12–16)
IMM GRANULOCYTES # BLD AUTO: 0.01 K/UL (ref 0–0.11)
IMM GRANULOCYTES NFR BLD AUTO: 0.2 % (ref 0–0.9)
INR PPP: 2.68 (ref 0.87–1.13)
LDLC SERPL CALC-MCNC: 101 MG/DL
LV EJECT FRACT  99904: 75
LV EJECT FRACT MOD 2C 99903: 68.49
LV EJECT FRACT MOD 4C 99902: 70.11
LV EJECT FRACT MOD BP 99901: 67.37
LYMPHOCYTES # BLD AUTO: 1.42 K/UL (ref 1–4.8)
LYMPHOCYTES NFR BLD: 23.8 % (ref 22–41)
MCH RBC QN AUTO: 30.5 PG (ref 27–33)
MCHC RBC AUTO-ENTMCNC: 32.5 G/DL (ref 33.6–35)
MCV RBC AUTO: 93.9 FL (ref 81.4–97.8)
MONOCYTES # BLD AUTO: 0.51 K/UL (ref 0–0.85)
MONOCYTES NFR BLD AUTO: 8.6 % (ref 0–13.4)
NEUTROPHILS # BLD AUTO: 3.77 K/UL (ref 2–7.15)
NEUTROPHILS NFR BLD: 63.2 % (ref 44–72)
NRBC # BLD AUTO: 0 K/UL
NRBC BLD-RTO: 0 /100 WBC
PLATELET # BLD AUTO: 165 K/UL (ref 164–446)
PMV BLD AUTO: 11.6 FL (ref 9–12.9)
POTASSIUM SERPL-SCNC: 4 MMOL/L (ref 3.6–5.5)
PROTHROMBIN TIME: 29.4 SEC (ref 12–14.6)
RBC # BLD AUTO: 4.62 M/UL (ref 4.2–5.4)
SODIUM SERPL-SCNC: 142 MMOL/L (ref 135–145)
TRIGL SERPL-MCNC: 197 MG/DL (ref 0–149)
TSH SERPL DL<=0.005 MIU/L-ACNC: 1.72 UIU/ML (ref 0.38–5.33)
WBC # BLD AUTO: 6 K/UL (ref 4.8–10.8)

## 2020-08-02 PROCEDURE — 84443 ASSAY THYROID STIM HORMONE: CPT

## 2020-08-02 PROCEDURE — G0378 HOSPITAL OBSERVATION PER HR: HCPCS

## 2020-08-02 PROCEDURE — 99214 OFFICE O/P EST MOD 30 MIN: CPT | Performed by: INTERNAL MEDICINE

## 2020-08-02 PROCEDURE — 99217 PR OBSERVATION CARE DISCHARGE: CPT | Performed by: STUDENT IN AN ORGANIZED HEALTH CARE EDUCATION/TRAINING PROGRAM

## 2020-08-02 PROCEDURE — 80048 BASIC METABOLIC PNL TOTAL CA: CPT

## 2020-08-02 PROCEDURE — 700102 HCHG RX REV CODE 250 W/ 637 OVERRIDE(OP): Performed by: INTERNAL MEDICINE

## 2020-08-02 PROCEDURE — 96372 THER/PROPH/DIAG INJ SC/IM: CPT

## 2020-08-02 PROCEDURE — 85025 COMPLETE CBC W/AUTO DIFF WBC: CPT

## 2020-08-02 PROCEDURE — 36415 COLL VENOUS BLD VENIPUNCTURE: CPT

## 2020-08-02 PROCEDURE — 80061 LIPID PANEL: CPT

## 2020-08-02 PROCEDURE — A9270 NON-COVERED ITEM OR SERVICE: HCPCS | Performed by: INTERNAL MEDICINE

## 2020-08-02 PROCEDURE — 93306 TTE W/DOPPLER COMPLETE: CPT | Mod: 26 | Performed by: INTERNAL MEDICINE

## 2020-08-02 PROCEDURE — 82962 GLUCOSE BLOOD TEST: CPT

## 2020-08-02 PROCEDURE — 85610 PROTHROMBIN TIME: CPT

## 2020-08-02 PROCEDURE — 93306 TTE W/DOPPLER COMPLETE: CPT

## 2020-08-02 RX ORDER — INDOMETHACIN 25 MG/1
25 CAPSULE ORAL 3 TIMES DAILY PRN
Qty: 1 CAP | Refills: 0
Start: 2020-08-02

## 2020-08-02 RX ORDER — MECLIZINE HYDROCHLORIDE 25 MG/1
25 TABLET ORAL
COMMUNITY

## 2020-08-02 RX ORDER — FEXOFENADINE HCL 60 MG/1
180 TABLET, FILM COATED ORAL DAILY
COMMUNITY

## 2020-08-02 RX ORDER — ATORVASTATIN CALCIUM 40 MG/1
40 TABLET, FILM COATED ORAL EVERY EVENING
Qty: 30 TAB | Refills: 2 | Status: SHIPPED | OUTPATIENT
Start: 2020-08-02

## 2020-08-02 RX ORDER — ATORVASTATIN CALCIUM 40 MG/1
40 TABLET, FILM COATED ORAL EVERY EVENING
Qty: 30 TAB | Refills: 2 | Status: CANCELLED | OUTPATIENT
Start: 2020-08-02

## 2020-08-02 RX ORDER — M-VIT,TX,IRON,MINS/CALC/FOLIC 27MG-0.4MG
1 TABLET ORAL DAILY
COMMUNITY

## 2020-08-02 RX ADMIN — LEVOTHYROXINE SODIUM 88 MCG: 88 TABLET ORAL at 04:24

## 2020-08-02 RX ADMIN — ASPIRIN 81 MG: 81 TABLET, CHEWABLE ORAL at 06:15

## 2020-08-02 RX ADMIN — INSULIN HUMAN 1 UNITS: 100 INJECTION, SOLUTION PARENTERAL at 07:43

## 2020-08-02 RX ADMIN — INSULIN HUMAN 1 UNITS: 100 INJECTION, SOLUTION PARENTERAL at 11:57

## 2020-08-02 ASSESSMENT — ENCOUNTER SYMPTOMS
EYES NEGATIVE: 1
CHEST TIGHTNESS: 0
RESPIRATORY NEGATIVE: 1
SHORTNESS OF BREATH: 0
CONSTITUTIONAL NEGATIVE: 1
APNEA: 0
COUGH: 0
PSYCHIATRIC NEGATIVE: 1
CARDIOVASCULAR NEGATIVE: 1
MUSCULOSKELETAL NEGATIVE: 1
CHOKING: 0
WHEEZING: 0
STRIDOR: 0
GASTROINTESTINAL NEGATIVE: 1
NEUROLOGICAL NEGATIVE: 1

## 2020-08-02 NOTE — PROGRESS NOTES
Monitor Summary: SB-SR rate 48-70, NV -.24, QRS -.08, QT -.48, with occasional and frequent PVCs, rare couplets, and brief periods of bradycardia 31-40 nonsustaining per strip from the monitor room.

## 2020-08-02 NOTE — DISCHARGE SUMMARY
Discharge Summary    CHIEF COMPLAINT ON ADMISSION  No chief complaint on file.      Reason for Admission  TIA     Admission Date  7/31/2020    CODE STATUS  Full Code    HPI & HOSPITAL COURSE  85 y.o. female with past medical history of diabetes type 2 on insulin, GI bleed, hypertension, dyslipidemia, stroke 2010 with residual visual field deficit, mechanical aortic valve replacement in 1999, hypothyroidism, chronic anticoagulation with warfarin, who was transferred from [facility] with concern for possible transitory ischemic attack. Patient was transferred due to absence of MRI machine at outside facility  Patient was presented at outside facility emergency department with complaints of weakness in the left upper and lower extremity and slurred speech that started approximately at 3 PM.  Apparently, symptoms quickly improved;  no neurological deficit documented on physical exam at outside facility.   In ER she was noted to have several episodes of bradycardia with heart rate at 40. Patient remained asymptomatic. She was evaluated by cardiology and recommended avoidance of AV eli blocking medications and a TTE which came back unremarkable.  In terms of TIA, MRI did show some chronic infarcts nothing acute. CTA of the neck that showed 75% stenosis of the left carotid artery. She will be followed up outpatient by vascular surgery.   She was also advised to follow-up in primary care doctor for management of her blood pressure and type 2 diabetes.      Therefore, she is discharged in fair and stable condition to home with close outpatient follow-up.    The patient met 2-midnight criteria for an inpatient stay at the time of discharge.    Discharge Date  08/2/2020    FOLLOW UP ITEMS POST DISCHARGE  Follow-up with primary care doctor in 7 to 10 days.  Follow-up with cardiology in 7 to 10 days.  Follow-up with vascular surgery in the clinic.    DISCHARGE DIAGNOSES  Active Problems:    Chronic anticoagulation  (Chronic) POA: Yes      Overview: Aortic Valve replacement.  Dr Isacc Murdock    Carotid stenosis, left POA: Unknown    DM (diabetes mellitus) (HCC) (Chronic) POA: Yes  Resolved Problems:    TIA (transient ischemic attack) POA: Unknown    Bradycardia POA: Unknown      FOLLOW UP  No future appointments.  Galileo Aguirre D.O.  [address] [facility] [address]  [phone]    In 1 week        MEDICATIONS ON DISCHARGE     Medication List      START taking these medications      Instructions   atorvastatin 40 MG Tabs  Commonly known as:  LIPITOR   Take 1 Tab by mouth every evening.  Dose:  40 mg        CHANGE how you take these medications      Instructions   indomethacin 25 MG Caps  What changed:    · when to take this  · reasons to take this  Commonly known as:  INDOCIN   Take 1 Cap by mouth 3 times a day as needed. Indications: Acute Joint Inflammation in Gout  Dose:  25 mg        CONTINUE taking these medications      Instructions   aspirin 81 MG Chew chewable tablet  Commonly known as:  ASA   Take 81 mg by mouth every day.  Dose:  81 mg     fexofenadine 60 MG Tabs  Commonly known as:  ALLEGRA   Take 180 mg by mouth every day.  Dose:  180 mg     levothyroxine 88 MCG Tabs  Commonly known as:  SYNTHROID   Take 1 Tab by mouth every day.  Dose:  88 mcg     meclizine 25 MG Tabs  Commonly known as:  ANTIVERT   Take 25 mg by mouth Once PRN for Dizziness, Nausea/Vomiting or Vertigo.  Dose:  25 mg     omeprazole 20 MG delayed-release capsule  Commonly known as:  PRILOSEC   Take 1 Cap by mouth every day.  Dose:  20 mg     therapeutic multivitamin-minerals Tabs   Take 1 Tab by mouth every day.  Dose:  1 Tab     Tylenol 8 Hour 650 MG CR tablet  Generic drug:  acetaminophen   Take 650 mg by mouth every four hours as needed for Mild Pain.  Dose:  650 mg     warfarin 5 MG Tabs  Commonly known as:  COUMADIN   Doctor's comments:  This rx was submitted by a pharmacist working under a collaborative practice agreement.  TAKE 1 TABLET BY  MOUTH ONCE DAILY AS DIRECTED BY  [facility]  ANTICOAGULATION  SERVICES            Allergies  No Known Allergies    DIET  Orders Placed This Encounter   Procedures   • Diet Order Diabetic (Monitor with meals. Meds FLOATED in puree please)     Standing Status:   Standing     Number of Occurrences:   1     Order Specific Question:   Diet:     Answer:   Diabetic [3]     Comments:   Monitor with meals. Meds FLOATED in puree please     Order Specific Question:   Texture Modifier     Answer:   Level 6 - Soft & Bite Sized (Dysphagia 3)     Order Specific Question:   Liquid level     Answer:   Level 2 - Mildly Thick     Order Specific Question:   Miscellaneous modifications:     Answer:   SLP - Deliver to Nursing Station [22]       ACTIVITY  As tolerated.  Weight bearing as tolerated    CONSULTATIONS  Cardiology    PROCEDURES  None    LABORATORY  Lab Results   Component Value Date    SODIUM 142 08/02/2020    POTASSIUM 4.0 08/02/2020    CHLORIDE 107 08/02/2020    CO2 23 08/02/2020    GLUCOSE 189 (H) 08/02/2020    BUN 16 08/02/2020    CREATININE 1.08 08/02/2020        Lab Results   Component Value Date    WBC 6.0 08/02/2020    HEMOGLOBIN 14.1 08/02/2020    HEMATOCRIT 43.4 08/02/2020    PLATELETCT 165 08/02/2020        Total time of the discharge process exceeds 30 minutes.

## 2020-08-02 NOTE — PROGRESS NOTES
Monitor Summary: SR 60-81, HI .30, QRS .08, QT .48 with couplet & frequent PVC per strip from monitor room

## 2020-08-02 NOTE — PROGRESS NOTES
Discharge teaching provided to pt, all questions answered and concerns addressed. Belongings with pt, Pt taken by wheelchair to 's care  to drive her home.

## 2020-08-02 NOTE — ASSESSMENT & PLAN NOTE
Presented with TIA.  75% stenosis noted on CTA neck.  Patient to follow-up with vascular surgery in the clinic.  Continue aspirin/statins.

## 2020-08-02 NOTE — PROGRESS NOTES
Cardiology Follow Up Progress Note    Date of Service  8/2/2020    Attending Physician  Felix Umana D.O.    Chief Complaint       Transferred from [facility] for management of acute CVA, in addition brief episode of bradycardia with a rate of 39, asymptomatic.      MONSE Mejia is a 85 y.o. female admitted 7/31/2020 with TIA.      Past medical history significant for mechanical aortic valve replacement (1999 in [place]) on oral anticoagulation with warfarin with target INR 2.5-3.5, patient did not require bypass grafting at the time of AVR, hypertension, previous CVA (2010 right parietal occipital hemorrhage), dyslipidemia, type 2 diabetes, chronic kidney disease.    Interim Events    Overnight sinus rhythm  Brief episode of bradycardia this morning, asymptomatic  Stable for discharge  Follow-up with our cardiology office, will arrange if insurance permits  Echo reviewed  Blood pressure stable 130/70    Review of Systems  Review of Systems   Respiratory: Negative for apnea, cough, choking, chest tightness, shortness of breath, wheezing and stridor.    Cardiovascular: Negative for chest pain and leg swelling.       Vital signs in last 24 hours  Temp:  [36.2 °C (97.1 °F)-36.4 °C (97.6 °F)] 36.2 °C (97.1 °F)  Pulse:  [50-85] 85  Resp:  [16-20] 16  BP: (115-158)/(57-81) 130/70  SpO2:  [95 %-99 %] 95 %    Physical Exam  Physical Exam  Cardiovascular:      Rate and Rhythm: Bradycardia present.      Pulses: Normal pulses.   Pulmonary:      Effort: Pulmonary effort is normal.   Skin:     General: Skin is warm.   Neurological:      General: No focal deficit present.      Mental Status: She is alert.   Psychiatric:         Mood and Affect: Mood normal.         Lab Review  Lab Results   Component Value Date/Time    WBC 6.0 08/02/2020 06:41 AM    RBC 4.62 08/02/2020 06:41 AM    HEMOGLOBIN 14.1 08/02/2020 06:41 AM    HEMATOCRIT 43.4 08/02/2020 06:41 AM    MCV 93.9 08/02/2020  06:41 AM    MCH 30.5 08/02/2020 06:41 AM    MCHC 32.5 (L) 08/02/2020 06:41 AM    MPV 11.6 08/02/2020 06:41 AM      Lab Results   Component Value Date/Time    SODIUM 142 08/02/2020 06:41 AM    POTASSIUM 4.0 08/02/2020 06:41 AM    CHLORIDE 107 08/02/2020 06:41 AM    CO2 23 08/02/2020 06:41 AM    GLUCOSE 189 (H) 08/02/2020 06:41 AM    BUN 16 08/02/2020 06:41 AM    CREATININE 1.08 08/02/2020 06:41 AM      Lab Results   Component Value Date/Time    ASTSGOT 22 08/01/2020 02:07 AM    ALTSGPT 16 08/01/2020 02:07 AM     Lab Results   Component Value Date/Time    CHOLSTRLTOT 181 08/02/2020 06:41 AM     (H) 08/02/2020 06:41 AM    HDL 41 08/02/2020 06:41 AM    TRIGLYCERIDE 197 (H) 08/02/2020 06:41 AM    TROPONINT 16 08/01/2020 02:07 AM       No results for input(s): NTPROBNP in the last 72 hours.    Cardiac Imaging and Procedures Review  EKG: Sinus bradycardia    Echocardiogram:    8/2/2020  Underfilled left ventricule with normal chamber size. Mild concentric   left ventricular hypertrophy.  Hyperdynamic left ventricular systolic function estimated to be greater   than 75%.  Normal right ventricular size and systolic function.  Known mechanical aortic valve that is functioning normally with normal   transvalvular gradients.  Estimated right ventricular systolic pressure  is 27 mmHg; normal.  No pericardial effusion seen.        Cardiac Catheterization: Not applicable          MRI brain without 8/1/2020  -No evidence of acute territorial infarct, intracranial hemorrhage or mass lesion  -Large chronic right middle cerebral artery and anterior cerebral artery territory infarcts with evidence of remote hemorrhagic conversion or and parenchymal hemorrhage.  3.  Multiple bilateral cerebellar lacunar infarcts.  4.  Chronic left caudate head lacunar infarct.  5.  Mild diffuse cerebral substance loss.  6.  Moderate microangiopathic ischemic change versus demyelination or gliosis.  Imaging  Chest X-Ray:      Stress Test: Not  applicable    Assessment/Plan      TIA  -CT head without contrast without any acute changes showed encephalomalacia in the right temporal lobe from prior CVA  -MRI of the brain 8/1/2020, no evidence of acute territorial infarct, intracranial hemorrhage or mass lesion  -Patient has 75% carotid stenosis on the left side, follow-up with vascular surgery  -Continue with aspirin/statin    Bradycardia  -Sinus rhythm overnight  -Avoid AV node blocking agents  -Brief episode of bradycardia this morning, asymptomatic      Known mechanical aortic valve  -Based on recent echo, normal functioning, normal transvalvular gradients.  -Continue with warfarin with target INR 2.5-3.5  -Continue with aspirin    Stable for discharge  Follow-up with our cardiology office in [place], will arrange    Please contact me with any questions.    MARY Carter.   Cardiologist, [facility]  [phone]

## 2020-08-02 NOTE — PROGRESS NOTES
Hospital Medicine Daily Progress Note    Date of Service  8/2/2020    Chief Complaint  85 y.o. female admitted 7/31/2020 with TIA.     Hospital Course  85 y.o. female with past medical history of diabetes type 2 on insulin, GI bleed, hypertension, dyslipidemia, stroke 2010 with residual visual field deficit, mechanical aortic valve replacement in 1999, hypothyroidism, chronic anticoagulation with warfarin, who was transferred from [facility] with concern for possible transitory ischemic attack.  Patient was transferred due to absence of MRI machine at outside facility  Patient was presented at outside facility emergency department with complaints of weakness in the left upper and lower extremity and slurred speech that started approximately at 3 PM.  Apparently, symptoms quickly improved;  no neurological deficit documented on physical exam at outside facility.   In ER she was noted to have several episodes of bradycardia with heart rate at 40. Patient remained asymptomatic. She was evaluated by cardiology and recommended avoidance of AV eli blocking medications and a TTE.  In terms of TIA, MRI did show some chronic infarcts nothing acute.  CTA of the neck that showed 75% stenosis of the left carotid artery. She will be followed up outpatient by vascular surgery.      Interval Problem Update  Patient seen and examined at bedside this morning.  No acute events overnight.   MRI with old infarcts, nothing acute, CTA neck with left carotid stenosis 75%.  Will follow-up outpatient with vascular surgery.  We will discharge home today.    Consultants/Specialty  None    Code Status  Full    Disposition  Home    Review of Systems  Review of Systems   Constitutional: Negative.    HENT: Negative.    Eyes: Negative.    Respiratory: Negative.    Cardiovascular: Negative.    Gastrointestinal: Negative.    Genitourinary: Negative.    Musculoskeletal: Negative.    Skin: Negative.    Neurological: Negative.     Psychiatric/Behavioral: Negative.         Physical Exam  Temp:  [36.2 °C (97.1 °F)-36.4 °C (97.6 °F)] 36.2 °C (97.2 °F)  Pulse:  [50-68] 64  Resp:  [16-20] 16  BP: (115-158)/(57-81) 158/77  SpO2:  [95 %-99 %] 95 %    Physical Exam  Constitutional:       Appearance: Normal appearance.   HENT:      Head: Normocephalic and atraumatic.      Nose: Nose normal.   Eyes:      Conjunctiva/sclera: Conjunctivae normal.      Pupils: Pupils are equal, round, and reactive to light.   Neck:      Musculoskeletal: Normal range of motion and neck supple.   Cardiovascular:      Rate and Rhythm: Normal rate and regular rhythm.      Pulses: Normal pulses.      Heart sounds: Murmur present.   Pulmonary:      Effort: Pulmonary effort is normal.      Breath sounds: Normal breath sounds.   Abdominal:      General: Bowel sounds are normal. There is no distension.      Palpations: Abdomen is soft.      Tenderness: There is no abdominal tenderness.   Musculoskeletal: Normal range of motion.         General: No swelling, tenderness or deformity.   Skin:     General: Skin is warm.      Findings: No bruising or lesion.   Neurological:      General: No focal deficit present.      Mental Status: She is alert and oriented to person, place, and time.      Sensory: No sensory deficit.      Motor: No weakness.   Psychiatric:         Mood and Affect: Mood normal.         Behavior: Behavior normal.         Fluids    Intake/Output Summary (Last 24 hours) at 8/2/2020 0941  Last data filed at 8/1/2020 1500  Gross per 24 hour   Intake 300 ml   Output --   Net 300 ml       Laboratory  Recent Labs     08/02/20  0641   WBC 6.0   RBC 4.62   HEMOGLOBIN 14.1   HEMATOCRIT 43.4   MCV 93.9   MCH 30.5   MCHC 32.5*   RDW 45.5   PLATELETCT 165   MPV 11.6     Recent Labs     08/01/20  0207 08/02/20  0641   SODIUM 141 142   POTASSIUM 4.0 4.0   CHLORIDE 106 107   CO2 21 23   GLUCOSE 187* 189*   BUN 21 16   CREATININE 1.09 1.08   CALCIUM 9.4 9.2     Recent Labs      08/01/20  0207 08/02/20  0641   INR 3.79* 2.68*         Recent Labs     08/02/20  0641   TRIGLYCERIDE 197*   HDL 41   *       Imaging  MR-BRAIN-W/O   Final Result      1.  No evidence of acute territorial infarct, intracranial hemorrhage or mass lesion.   2.  Large chronic right middle cerebral artery and anterior cerebral artery territory infarcts with evidence of remote hemorrhagic conversion or and parenchymal hemorrhage.   3.  Multiple bilateral cerebellar lacunar infarcts.   4.  Chronic left caudate head lacunar infarct.   5.  Mild diffuse cerebral substance loss.   6.  Moderate microangiopathic ischemic change versus demyelination or gliosis.      CT-CTA NECK WITH & W/O-POST PROCESSING   Final Result      1.  Approximately 75% focal narrowing at the origin of the left internal carotid artery with atherosclerotic calcification and irregular plaque formation.      2.  Less than 20% focal narrowing at the origin of the right internal carotid artery.      3.  Patent bilateral common carotid arteries.      4.  Patent bilateral vertebral arteries with left-sided vertebral artery dominance.      CT-CTA HEAD WITH & W/O-POST PROCESS   Final Result      1.  CT angiogram of the [place] within normal limits.      2.  Large area of encephalomalacia in the right middle cerebral and anterior cerebral artery distributions.      EC-ECHOCARDIOGRAM COMPLETE W/O CONT    (Results Pending)        Assessment/Plan  TIA (transient ischemic attack)  Assessment & Plan  CT head without contrast at outside facility showed encephalomalacia in the right temporal lobe, from prior CVA.  No acute changes.  Patient is not a candidate for TPA as motor deficit resolved.  MRI of the brain, CTA of head and neck-chronic infarcts with 75% carotid stenosis on the left side-she will follow-up with vascular surgery in the clinic.  Transthoracic echo pending  Neurochecks every 4 hours  Continue aspirin/statins.  PCP follow-up for blood  pressure management.    Chronic anticoagulation- (present on admission)  Assessment & Plan  Continue warfarin for mechanical aortic valve.  Goal INR 2.5-3.5  Transthoracic echo pending    Carotid stenosis, left  Assessment & Plan  Presented with TIA.  75% stenosis noted on CTA neck.  Patient to follow-up with vascular surgery in the clinic.  Continue aspirin/statins.    Bradycardia  Assessment & Plan  Patient was noted to have heart rate 40 (lowest 39) at outside facility emergency department, while she was asymptomatic.   She is not on any eli medications.  Her heart rate is currently 67  Plan to monitor patient on telemetry.    Cardiology following.    DM (diabetes mellitus) (HCC)- (present on admission)  Assessment & Plan  She denies current use of insulin  Start sliding scale, hold glimepiride          VTE prophylaxis: Warfarin.

## 2020-08-02 NOTE — DISCHARGE INSTRUCTIONS
"  Transient Ischemic Attack    A transient ischemic attack (TIA) is a \"warning stroke\" that causes stroke-like symptoms that go away quickly. A TIA does not cause lasting damage to the brain. But having a TIA is a sign that you may be at risk for a stroke. Lifestyle changes and medical treatments can help prevent a stroke.  It is important to know the symptoms of a TIA and what to do. Get help right away, even if your symptoms go away. The symptoms of a TIA are the same as those of a stroke. They can happen fast, and they usually go away within minutes or hours. They can include:  · Weakness or loss of feeling in your face, arm, or leg. This often happens on one side of your body.  · Trouble walking.  · Trouble moving your arms or legs.  · Trouble talking or understanding what people are saying.  · Trouble seeing.  · Seeing two of one object (double vision).  · Feeling dizzy.  · Feeling confused.  · Loss of balance or coordination.  · Feeling sick to your stomach (nauseous) and throwing up (vomiting).  · A very bad headache for no reason.  What increases the risk?  Certain things may make you more likely to have a TIA. Some of these are things that you can change, such as:  · Being very overweight (obese).  · Using products that contain nicotine or tobacco, such as cigarettes and e-cigarettes.  · Taking birth control pills.  · Not being active.  · Drinking too much alcohol.  · Using drugs.  Other risk factors include:  · Having an irregular heartbeat (atrial fibrillation).  · Being  or .  · Having had blood clots, stroke, TIA, or heart attack in the past.  · Being a woman with a history of high blood pressure in pregnancy (preeclampsia).  · Being over the age of 60.  · Being male.  · Having family history of stroke.  · Having the following diseases or conditions:  ? High blood pressure.  ? High cholesterol.  ? Diabetes.  ? Heart disease.  ? Sickle cell disease.  ? Sleep apnea.  ? Migraine " "headache.  ? Long-term (chronic) diseases that cause soreness and swelling (inflammation).  ? Disorders that affect how your blood clots.  Follow these instructions at home:  Medicines    · Take over-the-counter and prescription medicines only as told by your doctor.  · If you were told to take aspirin or another medicine to thin your blood, take it exactly as told by your doctor.  ? Taking too much of the medicine can cause bleeding.  ? Taking too little of the medicine may not work to treat the problem.  Eating and drinking    · Eat 5 or more servings of fruits and vegetables each day.  · Follow instructions from your doctor about your diet. You may need to follow a certain diet to help lower your risk of having a stroke. You may need to:  ? Eat a diet that is low in fat and salt.  ? Eat foods that contain a lot of fiber.  ? Limit the amount of carbohydrates and sugar in your diet.  · Limit alcohol intake to 1 drink a day for nonpregnant women and 2 drinks a day for men. One drink equals 12 oz of beer, 5 oz of wine, or 1½ oz of hard liquor.  General instructions  · Keep a healthy weight.  · Stay active. Try to get at least 30 minutes of activity on all or most days.  · Find out if you have a condition called sleep apnea. Get treatment if needed.  · Do not use any products that contain nicotine or tobacco, such as cigarettes and e-cigarettes. If you need help quitting, ask your doctor.  · Do not abuse drugs.  · Keep all follow-up visits as told by your doctor. This is important.  Get help right away if:  · You have any signs of stroke. \"BE FAST\" is an easy way to remember the main warning signs:  ? B - Balance. Signs are dizziness, sudden trouble walking, or loss of balance.  ? E - Eyes. Signs are trouble seeing or a sudden change in how you see.  ? F - Face. Signs are sudden weakness or loss of feeling of the face, or the face or eyelid drooping on one side.  ? A - Arms. Signs are weakness or loss of feeling in an " "arm. This happens suddenly and usually on one side of the body.  ? S - Speech. Signs are sudden trouble speaking, slurred speech, or trouble understanding what people say.  ? T - Time. Time to call emergency services. Write down what time symptoms started.  · You have other signs of stroke, such as:  ? A sudden, very bad headache with no known cause.  ? Feeling sick to your stomach (nausea).  ? Throwing up (vomiting).  ? Jerky movements that you cannot control (seizure).  These symptoms may be an emergency. Do not wait to see if the symptoms will go away. Get medical help right away. Call your local emergency services (911 in the [place] Do not drive yourself to the hospital.  Summary  · A transient ischemic attack (TIA) is a \"warning stroke\" that causes stroke-like symptoms that go away quickly.  · A TIA is a medical emergency. Get help right away, even if your symptoms go away.  · A TIA does not cause lasting damage to the brain.  · Having a TIA is a sign that you may be at risk for a stroke. Lifestyle changes and medical treatments can help prevent a stroke.  This information is not intended to replace advice given to you by your health care provider. Make sure you discuss any questions you have with your health care provider.  Document Released: 09/26/2009 Document Revised: 09/13/2019 Document Reviewed: 03/21/2018  [employer] Patient Education © 2020 [employer]    Discharge Instructions    Discharged to home by car with significant other. Discharged via wheelchair, hospital escort: Yes.  Special equipment needed: Not Applicable    Be sure to schedule a follow-up appointment with your primary care doctor or any specialists as instructed.     Discharge Plan:        I understand that a diet low in cholesterol, fat, and sodium is recommended for good health. Unless I have been given specific instructions below for another diet, I accept this instruction as my diet prescription.   Other diet: Diabetic    Special " Instructions:     Stroke/CVA/TIA/Hemorrhagic Ischemia Discharge Instructions  You have had a stroke. Your risk factors have been identified as follows:  Age - Over 55  Diabetes  Heart disease  Carotid Stenosis   It is important that you reduce your risk factors to avoid another stroke in the future. Here are some general guidelines to follow:  · Eat healthy - avoid food high in fat.  · Get regular exercise.  · Maintain a healthy weight.  · Avoid smoking.  · Avoid alcohol and illegal drug use.  · Take your medications as directed.  For more information regarding risk factors, refer to pages 17-19 in your Stroke Patient Education Guide. Stroke Education Guide was given to significant other.    Warning signs of a stroke include (which can also be found on page 3 of your Stroke Patient Education Guide):  · Sudden numbness of weakness of the face, arm or leg (especially on one side of the body).  · Sudden confusion, trouble speaking or understanding.  · Sudden trouble seeing in one or both eyes.  · Sudden trouble walking, dizziness, loss of balance or coordination.  · Sudden severe headache with no known cause.  It is very important to get treatment quickly when a stroke occurs. If you experience any of the above warning signs, call [phone] immediately.     Some patients who have had a stroke will be going home on a blood thinner medication called Warfarin (Coumadin).  This medication requires very close monitoring and follow up.  This follow up can be provided by either your Primary Care Physician or by [facility]'s Outpatient Anticoagulation Service.  The Outpatient Anticoagulation Service is located at the [facility] at [facility] ([facility]).  If you do not know when your follow up appointment is scheduled, call [phone] to verify your appointment time.      · Is patient discharged on Warfarin / Coumadin?   Yes    You are receiving the drug warfarin. Please  "understand the importance of monitoring warfarin with scheduled PT/INR blood draws.  Follow-up with the Coumadin Clinic in one week for INR lab..    IMPORTANT: HOW TO USE THIS INFORMATION:  This is a summary and does NOT have all possible information about this product. This information does not assure that this product is safe, effective, or appropriate for you. This information is not individual medical advice and does not substitute for the advice of your health care professional. Always ask your health care professional for complete information about this product and your specific health needs.      WARFARIN - ORAL (WARF-uh-rin)      COMMON BRAND NAME(S): Coumadin      WARNING:  Warfarin can cause very serious (possibly fatal) bleeding. This is more likely to occur when you first start taking this medication or if you take too much warfarin. To decrease your risk for bleeding, your doctor or other health care provider will monitor you closely and check your lab results (INR test) to make sure you are not taking too much warfarin. Keep all medical and laboratory appointments. Tell your doctor right away if you notice any signs of serious bleeding. See also Side Effects section.      USES:  This medication is used to treat blood clots (such as in deep vein thrombosis-DVT or pulmonary embolus-PE) and/or to prevent new clots from forming in your body. Preventing harmful blood clots helps to reduce the risk of a stroke or heart attack. Conditions that increase your risk of developing blood clots include a certain type of irregular heart rhythm (atrial fibrillation), heart valve replacement, recent heart attack, and certain surgeries (such as hip/knee replacement). Warfarin is commonly called a \"blood thinner,\" but the more correct term is \"anticoagulant.\" It helps to keep blood flowing smoothly in your body by decreasing the amount of certain substances (clotting proteins) in your blood.      HOW TO USE:  Read the " Medication Guide provided by your pharmacist before you start taking warfarin and each time you get a refill. If you have any questions, ask your doctor or pharmacist. Take this medication by mouth with or without food as directed by your doctor or other health care professional, usually once a day. It is very important to take it exactly as directed. Do not increase the dose, take it more frequently, or stop using it unless directed by your doctor. Dosage is based on your medical condition, laboratory tests (such as INR), and response to treatment. Your doctor or other health care provider will monitor you closely while you are taking this medication to determine the right dose for you. Use this medication regularly to get the most benefit from it. To help you remember, take it at the same time each day. It is important to eat a balanced, consistent diet while taking warfarin. Some foods can affect how warfarin works in your body and may affect your treatment and dose. Avoid sudden large increases or decreases in your intake of foods high in vitamin K (such as broccoli, cauliflower, cabbage, brussels sprouts, kale, spinach, and other green leafy vegetables, liver, green tea, certain vitamin supplements). If you are trying to lose weight, check with your doctor before you try to go on a diet. Cranberry products may also affect how your warfarin works. Limit the amount of cranberry juice (16 ounces/480 milliliters a day) or other cranberry products you may drink or eat.      SIDE EFFECTS:  Nausea, loss of appetite, or stomach/abdominal pain may occur. If any of these effects persist or worsen, tell your doctor or pharmacist promptly. Remember that your doctor has prescribed this medication because he or she has judged that the benefit to you is greater than the risk of side effects. Many people using this medication do not have serious side effects. This medication can cause serious bleeding if it affects your blood  clotting proteins too much (shown by unusually high INR lab results). Even if your doctor stops your medication, this risk of bleeding can continue for up to a week. Tell your doctor right away if you have any signs of serious bleeding, including: unusual pain/swelling/discomfort, unusual/easy bruising, prolonged bleeding from cuts or gums, persistent/frequent nosebleeds, unusually heavy/prolonged menstrual flow, pink/dark urine, coughing up blood, vomit that is bloody or looks like coffee grounds, severe headache, dizziness/fainting, unusual or persistent tiredness/weakness, bloody/black/tarry stools, chest pain, shortness of breath, difficulty swallowing. Tell your doctor right away if any of these unlikely but serious side effects occur: persistent nausea/vomiting, severe stomach/abdominal pain, yellowing eyes/skin. This drug rarely has caused very serious (possibly fatal) problems if its effects lead to small blood clots (usually at the beginning of treatment). This can lead to severe skin/tissue damage that may require surgery or amputation if left untreated. Patients with certain blood conditions (protein C or S deficiency) may be at greater risk. Get medical help right away if any of these rare but serious side effects occur: painful/red/purplish patches on the skin (such as on the toe, breast, abdomen), change in the amount of urine, vision changes, confusion, slurred speech, weakness on one side of the body. A very serious allergic reaction to this drug is rare. However, get medical help right away if you notice any symptoms of a serious allergic reaction, including: rash, itching/swelling (especially of the face/tongue/throat), severe dizziness, trouble breathing. This is not a complete list of possible side effects. If you notice other effects not listed above, contact your doctor or pharmacist. In the [place] - Call your doctor for medical advice about side effects. You may report side effects to FDA at  3-400-SEM-0756. In [place] - Call your doctor for medical advice about side effects. You may report side effects to [facility] [place] at 1-441.439.7493.      PRECAUTIONS:  Before taking warfarin, tell your doctor or pharmacist if you are allergic to it; or if you have any other allergies. This product may contain inactive ingredients, which can cause allergic reactions or other problems. Talk to your pharmacist for more details. Before using this medication, tell your doctor or pharmacist your medical history, especially of: blood disorders (such as anemia, hemophilia), bleeding problems (such as bleeding of the stomach/intestines, bleeding in the brain), blood vessel disorders (such as aneurysms), recent major injury/surgery, liver disease, alcohol use, mental/mood disorders (including memory problems), frequent falls/injuries. It is important that all your doctors and dentists know that you take warfarin. Before having surgery or any medical/dental procedures, tell your doctor or dentist that you are taking this medication and about all the products you use (including prescription drugs, nonprescription drugs, and herbal products). Avoid getting injections into the muscles. If you must have an injection into a muscle (for example, a flu shot), it should be given in the arm. This way, it will be easier to check for bleeding and/or apply pressure bandages. This medication may cause stomach bleeding. Daily use of alcohol while using this medicine will increase your risk for stomach bleeding and may also affect how this medication works. Limit or avoid alcoholic beverages. If you have not been eating well, if you have an illness or infection that causes fever, vomiting, or diarrhea for more than 2 days, or if you start using any antibiotic medications, contact your doctor or pharmacist immediately because these conditions can affect how warfarin works. This medication can cause heavy bleeding. To lower the chance of  getting cut, bruised, or injured, use great caution with sharp objects like safety razors and nail cutters. Use an electric razor when shaving and a soft toothbrush when brushing your teeth. Avoid activities such as contact sports. If you fall or injure yourself, especially if you hit your head, call your doctor immediately. Your doctor may need to check you. The Food & Drug Administration has stated that generic warfarin products are interchangeable. However, consult your doctor or pharmacist before switching warfarin products. Be careful not to take more than one medication that contains warfarin unless specifically directed by the doctor or health care provider who is monitoring your warfarin treatment. Older adults may be at greater risk for bleeding while using this drug. This medication is not recommended for use during pregnancy because of serious (possibly fatal) harm to an unborn baby. Discuss the use of reliable forms of birth control with your doctor. If you become pregnant or think you may be pregnant, tell your doctor immediately. If you are planning pregnancy, discuss a plan for managing your condition with your doctor before you become pregnant. Your doctor may switch the type of medication you use during pregnancy. Very small amounts of this medication may pass into breast milk but is unlikely to harm a nursing infant. Consult your doctor before breast-feeding.      DRUG INTERACTIONS:  Drug interactions may change how your medications work or increase your risk for serious side effects. This document does not contain all possible drug interactions. Keep a list of all the products you use (including prescription/nonprescription drugs and herbal products) and share it with your doctor and pharmacist. Do not start, stop, or change the dosage of any medicines without your doctor's approval. Warfarin interacts with many prescription, nonprescription, vitamin, and herbal products. This includes medications  "that are applied to the skin or inside the vagina or rectum. The interactions with warfarin usually result in an increase or decrease in the \"blood-thinning\" (anticoagulant) effect. Your doctor or other health care professional should closely monitor you to prevent serious bleeding or clotting problems. While taking warfarin, it is very important to tell your doctor or pharmacist of any changes in medications, vitamins, or herbal products that you are taking. Some products that may interact with this drug include: capecitabine, imatinib, mifepristone. Aspirin, aspirin-like drugs (salicylates), and nonsteroidal anti-inflammatory drugs (NSAIDs such as ibuprofen, naproxen, celecoxib) may have effects similar to warfarin. These drugs may increase the risk of bleeding problems if taken during treatment with warfarin. Carefully check all prescription/nonprescription product labels (including drugs applied to the skin such as pain-relieving creams) since the products may contain NSAIDs or salicylates. Talk to your doctor about using a different medication (such as acetaminophen) to treat pain/fever. Low-dose aspirin and related drugs (such as clopidogrel, ticlopidine) should be continued if prescribed by your doctor for specific medical reasons such as heart attack or stroke prevention. Consult your doctor or pharmacist for more details. Many herbal products interact with warfarin. Tell your doctor before taking any herbal products, especially bromelains, coenzyme Q10, cranberry, danshen, dong quai, fenugreek, garlic, ginkgo biloba, ginseng, and Debra's wort, among others. This medication may interfere with a certain laboratory test to measure theophylline levels, possibly causing false test results. Make sure laboratory personnel and all your doctors know you use this drug.      OVERDOSE:  If overdose is suspected, contact a poison control center or emergency room immediately. [place] residents can call the [place] National " Poison Hotline at 1-328.585.1068. [place] residents can call a provincial poison control center. Symptoms of overdose may include: bloody/black/tarry stools, pink/dark urine, unusual/prolonged bleeding.      NOTES:  Do not share this medication with others. Laboratory and/or medical tests (such as INR, complete blood count) must be performed periodically to monitor your progress or check for side effects. Consult your doctor for more details.      MISSED DOSE:  For the best possible benefit, do not miss any doses. If you do miss a dose and remember on the same day, take it as soon as you remember. If you remember on the next day, skip the missed dose and resume your usual dosing schedule. Do not double the dose to catch up because this could increase your risk for bleeding. Keep a record of missed doses to give to your doctor or pharmacist. Contact your doctor or pharmacist if you miss 2 or more doses in a row.      STORAGE:  Store at room temperature away from light and moisture. Do not store in the bathroom. Keep all medications away from children and pets. Do not flush medications down the toilet or pour them into a drain unless instructed to do so. Properly discard this product when it is  or no longer needed. Consult your pharmacist or local waste disposal company for more details about how to safely discard your product.      MEDICAL ALERT:  Your condition and medication can cause complications in a medical emergency. For information about enrolling in MedicAlert, call 1-211.867.7861 ([place]) or 1-255.977.5265 ([place]).      Information last revised 2010 Copyright(c) 2010 [employer]             Depression / Suicide Risk    As you are discharged from this [facility] facility, it is important to learn how to keep safe from harming yourself.    Recognize the warning signs:  · Abrupt changes in personality, positive or negative- including increase in energy   · Giving away  possessions  · Change in eating patterns- significant weight changes-  positive or negative  · Change in sleeping patterns- unable to sleep or sleeping all the time   · Unwillingness or inability to communicate  · Depression  · Unusual sadness, discouragement and loneliness  · Talk of wanting to die  · Neglect of personal appearance   · Rebelliousness- reckless behavior  · Withdrawal from people/activities they love  · Confusion- inability to concentrate     If you or a loved one observes any of these behaviors or has concerns about self-harm, here's what you can do:  · Talk about it- your feelings and reasons for harming yourself  · Remove any means that you might use to hurt yourself (examples: pills, rope, extension cords, firearm)  · Get professional help from the community (Mental Health, Substance Abuse, psychological counseling)  · Do not be alone:Call your Safe Contact- someone whom you trust who will be there for you.  · Call your local CRISIS HOTLINE 723-9055 or 374-355-5978  · Call your local Children's Mobile Crisis Response Team [place] (447) 634-3399 or [employer]  · Call the toll free National Suicide Prevention Hotlines   · National Suicide Prevention Lifeline 456-676-BMTC (3414)  · National Hope Line Network 800-SUICIDE (031-6523)          You have a history of old strokes, and ideally should be on a baby aspirin and cholesterol medication. Your blood pressure and diabetes are not well controlled.  These are risk factors for more strokes in the future.   Please follow-up with your family doctor-Dr. Aguirre in the next 7 to 10 days, he should go over your medication list with you and should decide if you need to be on medications for blood pressure and diabetes management.    Also, I placed a referral for you to see the vascular surgeon-Dr Montemayor, in the clinic. Your pictures did show you have some blockages on the left side of your neck. These could also contribute to more strokes in  the future.  So please follow-up with the vascular surgeon in the clinic and then decide how to manage the blockages on the left side.

## 2020-08-02 NOTE — CARE PLAN
Problem: Communication  Goal: The ability to communicate needs accurately and effectively will improve  Outcome: PROGRESSING AS EXPECTED  Note: Pt A&O x4, communicating needs/questions and using call light appropriately. Plan of care was discussed. Hourly rounding in place.       Problem: Safety  Goal: Will remain free from falls  Outcome: PROGRESSING AS EXPECTED  Note: No falls during shift. Universal safety precautions in place.

## 2020-08-02 NOTE — PROGRESS NOTES
Pt alert and oriented x4, denies pain, denies numbness and tingling. Discussed plan of care and echocardiogram. Bed low and locked, call light and belongings within reach, bed alarm on, all needs met at this time.

## 2020-08-02 NOTE — CARE PLAN
Problem: Knowledge Deficit  Goal: Knowledge of disease process/condition, treatment plan, diagnostic tests, and medications will improve  Outcome: PROGRESSING AS EXPECTED  Note: Pt educated about plan of care and tests, answering all of pts questions       Problem: Psychosocial Needs:  Goal: Ability to verbalize feelings about condition will improve  Outcome: PROGRESSING AS EXPECTED  Note: Encouraging pt to voice feelings, answering pt's questions and addressing concerns

## 2020-08-11 ENCOUNTER — ANTICOAGULATION MONITORING (OUTPATIENT)
Dept: VASCULAR LAB | Facility: MEDICAL CENTER | Age: 85
End: 2020-08-11

## 2020-08-11 DIAGNOSIS — Z79.01 CHRONIC ANTICOAGULATION: ICD-10-CM

## 2020-08-11 DIAGNOSIS — Z95.2 HISTORY OF HEART VALVE REPLACEMENT WITH MECHANICAL VALVE: ICD-10-CM

## 2020-08-11 DIAGNOSIS — I63.9 CEREBROVASCULAR ACCIDENT (CVA), UNSPECIFIED MECHANISM (HCC): ICD-10-CM

## 2020-08-11 LAB — INR PPP: 2.68 (ref 2–3.5)

## 2020-08-11 NOTE — PROGRESS NOTES
Error  Pt managed by [facility]  Ira Segura, Clinical Pharmacist, CDE, CACP

## 2020-08-14 ENCOUNTER — HOSPITAL ENCOUNTER (OUTPATIENT)
Dept: RADIOLOGY | Facility: MEDICAL CENTER | Age: 85
End: 2020-08-14
Payer: MEDICARE

## 2020-08-20 ENCOUNTER — HOSPITAL ENCOUNTER (OUTPATIENT)
Facility: MEDICAL CENTER | Age: 85
End: 2020-08-20
Attending: SURGERY | Admitting: SURGERY
Payer: MEDICARE

## 2020-08-21 ENCOUNTER — TELEPHONE (OUTPATIENT)
Dept: CARDIOLOGY | Facility: MEDICAL CENTER | Age: 85
End: 2020-08-21

## 2020-08-31 ENCOUNTER — APPOINTMENT (OUTPATIENT)
Dept: ADMISSIONS | Facility: MEDICAL CENTER | Age: 85
End: 2020-08-31
Payer: MEDICARE

## 2020-09-14 ENCOUNTER — HOSPITAL ENCOUNTER (OUTPATIENT)
Dept: HOSPITAL 8 - STAR | Age: 85
Discharge: HOME | End: 2020-09-14
Payer: MEDICARE

## 2020-09-14 DIAGNOSIS — Z01.812: Primary | ICD-10-CM

## 2020-09-14 DIAGNOSIS — Z20.828: ICD-10-CM

## 2020-09-14 PROCEDURE — 36415 COLL VENOUS BLD VENIPUNCTURE: CPT

## 2020-09-14 PROCEDURE — 87635 SARS-COV-2 COVID-19 AMP PRB: CPT

## 2020-09-18 ENCOUNTER — HOSPITAL ENCOUNTER (INPATIENT)
Dept: HOSPITAL 8 - ORIP | Age: 85
LOS: 1 days | Discharge: HOME | DRG: 39 | End: 2020-09-19
Attending: SURGERY | Admitting: SURGERY
Payer: MEDICARE

## 2020-09-18 VITALS — HEIGHT: 62 IN | WEIGHT: 141.1 LBS | BODY MASS INDEX: 25.96 KG/M2

## 2020-09-18 VITALS — DIASTOLIC BLOOD PRESSURE: 68 MMHG | SYSTOLIC BLOOD PRESSURE: 109 MMHG

## 2020-09-18 VITALS — DIASTOLIC BLOOD PRESSURE: 70 MMHG | SYSTOLIC BLOOD PRESSURE: 114 MMHG

## 2020-09-18 DIAGNOSIS — I10: ICD-10-CM

## 2020-09-18 DIAGNOSIS — I65.22: Primary | ICD-10-CM

## 2020-09-18 LAB
APTT BLD: 28 SECONDS (ref 25–31)
INR PPP: 1.16 (ref 0.93–1.1)
PROTHROMBIN TIME: 12 SECONDS (ref 9.6–11.5)

## 2020-09-18 PROCEDURE — 86922 COMPATIBILITY TEST ANTIGLOB: CPT

## 2020-09-18 PROCEDURE — 86900 BLOOD TYPING SEROLOGIC ABO: CPT

## 2020-09-18 PROCEDURE — 85347 COAGULATION TIME ACTIVATED: CPT

## 2020-09-18 PROCEDURE — 85610 PROTHROMBIN TIME: CPT

## 2020-09-18 PROCEDURE — 03CL0ZZ EXTIRPATION OF MATTER FROM LEFT INTERNAL CAROTID ARTERY, OPEN APPROACH: ICD-10-PCS | Performed by: SURGERY

## 2020-09-18 PROCEDURE — 85730 THROMBOPLASTIN TIME PARTIAL: CPT

## 2020-09-18 PROCEDURE — 86923 COMPATIBILITY TEST ELECTRIC: CPT

## 2020-09-18 PROCEDURE — 03UJ0KZ SUPPLEMENT LEFT COMMON CAROTID ARTERY WITH NONAUTOLOGOUS TISSUE SUBSTITUTE, OPEN APPROACH: ICD-10-PCS | Performed by: SURGERY

## 2020-09-18 PROCEDURE — 88300 SURGICAL PATH GROSS: CPT

## 2020-09-18 PROCEDURE — 82962 GLUCOSE BLOOD TEST: CPT

## 2020-09-18 PROCEDURE — C1768 GRAFT, VASCULAR: HCPCS

## 2020-09-18 PROCEDURE — 03UN0KZ SUPPLEMENT LEFT EXTERNAL CAROTID ARTERY WITH NONAUTOLOGOUS TISSUE SUBSTITUTE, OPEN APPROACH: ICD-10-PCS | Performed by: SURGERY

## 2020-09-18 PROCEDURE — 36415 COLL VENOUS BLD VENIPUNCTURE: CPT

## 2020-09-18 PROCEDURE — 03UL0KZ SUPPLEMENT LEFT INTERNAL CAROTID ARTERY WITH NONAUTOLOGOUS TISSUE SUBSTITUTE, OPEN APPROACH: ICD-10-PCS | Performed by: SURGERY

## 2020-09-18 PROCEDURE — 86870 RBC ANTIBODY IDENTIFICATION: CPT

## 2020-09-18 PROCEDURE — 03CJ0ZZ EXTIRPATION OF MATTER FROM LEFT COMMON CAROTID ARTERY, OPEN APPROACH: ICD-10-PCS | Performed by: SURGERY

## 2020-09-18 PROCEDURE — 03CN0ZZ EXTIRPATION OF MATTER FROM LEFT EXTERNAL CAROTID ARTERY, OPEN APPROACH: ICD-10-PCS | Performed by: SURGERY

## 2020-09-18 PROCEDURE — 86850 RBC ANTIBODY SCREEN: CPT

## 2020-09-18 RX ADMIN — ASPIRIN SCH MG: 81 TABLET, COATED ORAL at 19:35

## 2020-09-18 RX ADMIN — INSULIN HUMAN SCH UNITS: 100 INJECTION, SOLUTION PARENTERAL at 21:36

## 2020-09-18 RX ADMIN — INSULIN LISPRO SCH NOTE: 100 INJECTION, SOLUTION INTRAVENOUS; SUBCUTANEOUS at 19:14

## 2020-09-18 RX ADMIN — INSULIN LISPRO SCH NOTE: 100 INJECTION, SOLUTION INTRAVENOUS; SUBCUTANEOUS at 19:00

## 2020-09-19 VITALS — DIASTOLIC BLOOD PRESSURE: 62 MMHG | SYSTOLIC BLOOD PRESSURE: 104 MMHG

## 2020-09-19 VITALS — DIASTOLIC BLOOD PRESSURE: 61 MMHG | SYSTOLIC BLOOD PRESSURE: 106 MMHG

## 2020-09-19 VITALS — DIASTOLIC BLOOD PRESSURE: 76 MMHG | SYSTOLIC BLOOD PRESSURE: 126 MMHG

## 2020-09-19 VITALS — DIASTOLIC BLOOD PRESSURE: 78 MMHG | SYSTOLIC BLOOD PRESSURE: 130 MMHG

## 2020-09-19 RX ADMIN — HEPARIN SODIUM SCH UNITS: 5000 INJECTION, SOLUTION INTRAVENOUS; SUBCUTANEOUS at 06:00

## 2020-09-19 RX ADMIN — ASPIRIN SCH MG: 81 TABLET, COATED ORAL at 06:00

## 2020-09-19 RX ADMIN — HEPARIN SODIUM SCH UNITS: 5000 INJECTION, SOLUTION INTRAVENOUS; SUBCUTANEOUS at 14:05

## 2020-09-19 RX ADMIN — INSULIN HUMAN SCH UNITS: 100 INJECTION, SOLUTION PARENTERAL at 08:15

## 2020-09-19 RX ADMIN — INSULIN HUMAN SCH UNITS: 100 INJECTION, SOLUTION PARENTERAL at 11:38

## 2020-09-19 RX ADMIN — INSULIN LISPRO SCH NOTE: 100 INJECTION, SOLUTION INTRAVENOUS; SUBCUTANEOUS at 11:00

## 2021-10-18 ENCOUNTER — APPOINTMENT (RX ONLY)
Dept: URBAN - NONMETROPOLITAN AREA CLINIC 15 | Facility: CLINIC | Age: 86
Setting detail: DERMATOLOGY
End: 2021-10-18

## 2021-10-18 DIAGNOSIS — L81.4 OTHER MELANIN HYPERPIGMENTATION: ICD-10-CM

## 2021-10-18 DIAGNOSIS — D22 MELANOCYTIC NEVI: ICD-10-CM

## 2021-10-18 DIAGNOSIS — L82.1 OTHER SEBORRHEIC KERATOSIS: ICD-10-CM

## 2021-10-18 DIAGNOSIS — D18.0 HEMANGIOMA: ICD-10-CM

## 2021-10-18 PROBLEM — D48.5 NEOPLASM OF UNCERTAIN BEHAVIOR OF SKIN: Status: ACTIVE | Noted: 2021-10-18

## 2021-10-18 PROBLEM — D18.01 HEMANGIOMA OF SKIN AND SUBCUTANEOUS TISSUE: Status: ACTIVE | Noted: 2021-10-18

## 2021-10-18 PROBLEM — D22.5 MELANOCYTIC NEVI OF TRUNK: Status: ACTIVE | Noted: 2021-10-18

## 2021-10-18 PROBLEM — D22.61 MELANOCYTIC NEVI OF RIGHT UPPER LIMB, INCLUDING SHOULDER: Status: ACTIVE | Noted: 2021-10-18

## 2021-10-18 PROBLEM — D22.62 MELANOCYTIC NEVI OF LEFT UPPER LIMB, INCLUDING SHOULDER: Status: ACTIVE | Noted: 2021-10-18

## 2021-10-18 PROCEDURE — ? COUNSELING

## 2021-10-18 PROCEDURE — ? BIOPSY BY SHAVE METHOD

## 2021-10-18 PROCEDURE — 11102 TANGNTL BX SKIN SINGLE LES: CPT

## 2021-10-18 PROCEDURE — ? LIQUID NITROGEN

## 2021-10-18 PROCEDURE — 99203 OFFICE O/P NEW LOW 30 MIN: CPT | Mod: 25

## 2021-10-18 ASSESSMENT — LOCATION SIMPLE DESCRIPTION DERM
LOCATION SIMPLE: LOWER BACK
LOCATION SIMPLE: LEFT FOREARM
LOCATION SIMPLE: RIGHT UPPER BACK
LOCATION SIMPLE: LEFT UPPER ARM
LOCATION SIMPLE: LEFT FOREHEAD
LOCATION SIMPLE: RIGHT FOREARM
LOCATION SIMPLE: RIGHT UPPER ARM
LOCATION SIMPLE: ABDOMEN
LOCATION SIMPLE: CHEST
LOCATION SIMPLE: RIGHT FOREHEAD
LOCATION SIMPLE: LEFT ANTERIOR NECK
LOCATION SIMPLE: LEFT CHEEK

## 2021-10-18 ASSESSMENT — LOCATION DETAILED DESCRIPTION DERM
LOCATION DETAILED: LEFT INFERIOR MEDIAL FOREHEAD
LOCATION DETAILED: LEFT VENTRAL PROXIMAL FOREARM
LOCATION DETAILED: SUPERIOR LUMBAR SPINE
LOCATION DETAILED: RIGHT ANTERIOR DISTAL UPPER ARM
LOCATION DETAILED: RIGHT VENTRAL PROXIMAL FOREARM
LOCATION DETAILED: LEFT CENTRAL MANDIBULAR CHEEK
LOCATION DETAILED: SUBXIPHOID
LOCATION DETAILED: RIGHT ANTECUBITAL SKIN
LOCATION DETAILED: LEFT INFERIOR LATERAL NECK
LOCATION DETAILED: PERIUMBILICAL SKIN
LOCATION DETAILED: RIGHT INFERIOR MEDIAL UPPER BACK
LOCATION DETAILED: LEFT ANTECUBITAL SKIN
LOCATION DETAILED: LOWER STERNUM
LOCATION DETAILED: RIGHT INFERIOR MEDIAL FOREHEAD
LOCATION DETAILED: RIGHT MEDIAL UPPER BACK
LOCATION DETAILED: RIGHT INFERIOR UPPER BACK

## 2021-10-18 ASSESSMENT — LOCATION ZONE DERM
LOCATION ZONE: ARM
LOCATION ZONE: NECK
LOCATION ZONE: TRUNK
LOCATION ZONE: FACE

## 2021-10-18 NOTE — PROCEDURE: LIQUID NITROGEN
Medical Necessity Information: It is in your best interest to select a reason for this procedure from the list below. All of these items fulfill various CMS LCD requirements except the new and changing color options.
Consent: The patient's consent was obtained including but not limited to risks of crusting, scabbing, blistering, scarring, darker or lighter pigmentary change, recurrence, incomplete removal and infection.
Detail Level: Detailed
Include Z78.9 (Other Specified Conditions Influencing Health Status) As An Associated Diagnosis?: No
Post-Care Instructions: I reviewed with the patient in detail post-care instructions. Patient is to wear sunprotection, and avoid picking at any of the treated lesions. Pt may apply Vaseline to crusted or scabbing areas.
Medical Necessity Clause: This procedure was medically necessary because the lesions that were treated were:  If lesion does not resolve, bx is needed.
Duration Of Freeze Thaw-Cycle (Seconds): 0

## 2021-11-01 ENCOUNTER — DOCUMENTATION (OUTPATIENT)
Dept: VASCULAR LAB | Facility: MEDICAL CENTER | Age: 86
End: 2021-11-01

## 2021-11-02 ENCOUNTER — DOCUMENTATION (OUTPATIENT)
Dept: VASCULAR LAB | Facility: MEDICAL CENTER | Age: 86
End: 2021-11-02

## 2021-11-02 NOTE — PROGRESS NOTES
[facility]      Daysi from [facility] coumadin clinic called today, returning a call about this patient and anticoagulation management.   She reports that her clinic does NOT manage the patient's anticoagulation and patient has not seen Dr. Rossi at [facility] in over a year.     Second message left with [facility] to see if they currently manage this patient's anticoagulation. Will await further contact from them.       David DiggsD

## 2025-04-21 NOTE — CARE PLAN
Problem: Communication  Goal: The ability to communicate needs accurately and effectively will improve  Outcome: PROGRESSING AS EXPECTED  Note: Pt A&O x4, communicating needs and using call light appropriately. Discussed care plan and addressed pt questions/concerns. Hourly rounding in place.      Problem: Venous Thromboembolism (VTW)/Deep Vein Thrombosis (DVT) Prevention:  Goal: Patient will participate in Venous Thrombosis (VTE)/Deep Vein Thrombosis (DVT)Prevention Measures  Outcome: PROGRESSING AS EXPECTED  Flowsheets  Taken 7/31/2020 2330  Mechanical Prophylaxis: SCDs, Sequential Compression Device  Taken 8/1/2020 0537  SCDs, Sequential Compression Device: On      Pt reports foreign body if left eye since Friday, reports irritation.